# Patient Record
Sex: FEMALE | Race: WHITE | NOT HISPANIC OR LATINO | Employment: FULL TIME | URBAN - METROPOLITAN AREA
[De-identification: names, ages, dates, MRNs, and addresses within clinical notes are randomized per-mention and may not be internally consistent; named-entity substitution may affect disease eponyms.]

---

## 2017-01-19 ENCOUNTER — POST PARTUM (OUTPATIENT)
Dept: OBGYN | Facility: MEDICAL CENTER | Age: 37
End: 2017-01-19
Payer: COMMERCIAL

## 2017-01-19 VITALS
HEIGHT: 68 IN | WEIGHT: 264 LBS | DIASTOLIC BLOOD PRESSURE: 72 MMHG | SYSTOLIC BLOOD PRESSURE: 118 MMHG | BODY MASS INDEX: 40.01 KG/M2

## 2017-01-19 DIAGNOSIS — Z30.2 ENCOUNTER FOR STERILIZATION: ICD-10-CM

## 2017-01-19 PROCEDURE — 90050 PR POSTPARTUM VISIT: CPT | Performed by: OBSTETRICS & GYNECOLOGY

## 2017-01-19 RX ORDER — NORGESTIMATE AND ETHINYL ESTRADIOL 0.25-0.035
1 KIT ORAL DAILY
Qty: 28 TAB | Refills: 3 | Status: SHIPPED | OUTPATIENT
Start: 2017-01-19 | End: 2018-05-30 | Stop reason: SDUPTHER

## 2017-01-19 NOTE — MR AVS SNAPSHOT
"Caridad Nino   2017 2:15 PM   Post Partum   MRN: [id number]    Department:  [facility]   Dept Phone:  [phone]    Description:  Female : 1980   Provider:  Guillermina Joseph M.D.           Reason for Visit     Postpartum care           Allergies as of 2017     No Known Allergies      You were diagnosed with     Encounter for sterilization   [[id number]]         Vital Signs     Blood Pressure Height Weight Body Mass Index Last Menstrual Period Breastfeeding?    118/72 mmHg 1.727 m (5' 8\") 119.75 kg (264 lb) 40.15 kg/m2 2016 No    Smoking Status                   Former Smoker           Basic Information     Date Of Birth Sex Race Ethnicity Preferred Language    1980 Female White Non- English      Problem List              ICD-10-CM Priority Class Noted - Resolved    Supervision of elderly multigravida O09.529   2016 - Present    Gestational diabetes mellitus A2 O24.419   2016 - Present    Obesity E66.9   2016 - Present    GDM, class A2 O24.414   12/3/2016 - Present    Gestational HTN O13.9   12/3/2016 - Present    Morbid obesity due to excess calories (CMS-HCC) E66.01   12/3/2016 - Present      Health Maintenance        Date Due Completion Dates    PAP SMEAR 2019, 2016    IMM DTaP/Tdap/Td Vaccine (2 - Td) 2026            Current Immunizations     Influenza Vaccine Quad Inj (Pf) 2016  9:15 PM    Pneumococcal polysaccharide vaccine (PPSV-23) 2016  8:23 PM    Tdap Vaccine 2016      Below and/or attached are the medications your provider expects you to take. Review all of your home medications and newly ordered medications with your provider and/or pharmacist. Follow medication instructions as directed by your provider and/or pharmacist. Please keep your medication list with you and share with your provider. Update the information when medications are discontinued, doses are changed, or new " medications (including over-the-counter products) are added; and carry medication information at all times in the event of emergency situations     Allergies:  No Known Allergies          Medications  Valid as of: January 19, 2017 -  3:03 PM    Generic Name Brand Name Tablet Size Instructions for use    Docusate Sodium (Cap)  MG Take 100 mg by mouth 2 times a day as needed for Constipation.        Ferrous Fumarate-Vitamin C (Tab CR) Ferrous Fumarate-Vitamin C ER 65-25 MG Take 1 Tab by mouth 2 times a day, with meals.        Ibuprofen (Tab) MOTRIN 600 MG Take 1 Tab by mouth every 6 hours as needed (Cramping).        Prenatal Multivit-Min-Fe-FA   Take  by mouth.        .                 Medicines prescribed today were sent to:     [facility]/PHARMACY #[id number] - [place] - [address]    [address] [place] [address]    Phone: [phone] Fax: [phone]    Open 24 Hours?: No    [facility] # [id number] - [place] - [address]    [address] [address]    Phone: [phone] Fax: [phone]    Open 24 Hours?: No      Medication refill instructions:       If your prescription bottle indicates you have medication refills left, it is not necessary to call your provider’s office. Please contact your pharmacy and they will refill your medication.    If your prescription bottle indicates you do not have any refills left, you may request refills at any time through one of the following ways: The online C2cube system (except Urgent Care), by calling your provider’s office, or by asking your pharmacy to contact your provider’s office with a refill request. Medication refills are processed only during regular business hours and may not be available until the next business day. Your provider may request additional information or to have a follow-up visit with you prior to refilling your medication.   *Please Note: Medication refills are assigned a new Rx number when refilled electronically. Your pharmacy may  indicate that no refills were authorized even though a new prescription for the same medication is available at the pharmacy. Please request the medicine by name with the pharmacy before contacting your provider for a refill.           GenSpera Access Code: [email or web address]  Expires: 2/12/2017 10:49 AM    GenSpera  A secure, online tool to manage your health information     [employer]’s GenSpera® is a secure, online tool that connects you to your personalized health information from the privacy of your home -- day or night - making it very easy for you to manage your healthcare. Once the activation process is completed, you can even access your medical information using the GenSpera hakan, which is available for free in the Apple Hakan store or Google Play store.     GenSpera provides the following levels of access (as shown below):   My Chart Features   [facility] Doctor [facility]  Specialists [facility]  Urgent  Care Non-[facility]  Primary Care  Doctor   Email your healthcare team securely and privately 24/7 X X X    Manage appointments: schedule your next appointment; view details of past/upcoming appointments X      Request prescription refills. X      View recent personal medical records, including lab and immunizations X X X X   View health record, including health history, allergies, medications X X X X   Read reports about your outpatient visits, procedures, consult and ER notes X X X X   See your discharge summary, which is a recap of your hospital and/or ER visit that includes your diagnosis, lab results, and care plan. X X       How to register for GenSpera:  1. Go to  [email or web address].  2. Click on the Sign Up Now box, which takes you to the New Member Sign Up page. You will need to provide the following information:  a. Enter your GenSpera Access Code exactly as it appears at the top of this page. (You will not need to use this code after you’ve completed the sign-up process. If you do not sign up  before the expiration date, you must request a new code.)   b. Enter your date of birth.   c. Enter your home email address.   d. Click Submit, and follow the next screen’s instructions.  3. Create a [employer] ID. This will be your [employer] login ID and cannot be changed, so think of one that is secure and easy to remember.  4. Create a [employer] password. You can change your password at any time.  5. Enter your Password Reset Question and Answer. This can be used at a later time if you forget your password.   6. Enter your e-mail address. This allows you to receive e-mail notifications when new information is available in [employer].  7. Click Sign Up. You can now view your health information.    For assistance activating your [employer] account, call [phone]

## 2017-01-19 NOTE — PROGRESS NOTES
"   Chief Complaint   Patient presents with   • Postpartum care          Caridad Nino presents 5 weeks post partum following a spontaneous vaginal delivery.    Is not breast feeding currently.    ROS:           General Health:   Has a perception of excellent health.         Bleeding History: thin lochia         Breast:  Denies breast tenderness, mass, discharge, changes in size or contour, or abnormal cyclic discomfort.         GI:  Negative for abdominal pain, mass, fullness, nausea, vomiting, diarrhea, mucous or bloody stools.         Urinary:  Negative.}         Vulvo-Vaginal: contraception: none        PE:          /72 mmHg  Ht 1.727 m (5' 8\")  Wt 119.75 kg (264 lb)  BMI 40.15 kg/m2  LMP 02/25/2016  Breastfeeding? No         Breasts: negative         Abdomen: Abdomen soft, non-tender. BS normal. No masses,  No organomegaly          Pelvic Exam:              Vulva:normal              Vagina:normal vagina              Cervix: normal cervix: pale pink, mobile, non-tender, no discharge, no lacerations, no lesions              Corpus: normal size, contour, position, consistency, mobility, non-tender              Adnexa: Normal adnexa         Rectal Exam: Not performed.        Impression:         35 y/o   postpartum   wants sterilisation   discussed options essure VS laparoscopic  BTL (wants burnt and cut)    Contraception:         tubal ligation        Return visit in 6 weeks.  "

## 2017-01-24 ENCOUNTER — TELEPHONE (OUTPATIENT)
Dept: OBGYN | Facility: MEDICAL CENTER | Age: 37
End: 2017-01-24

## 2017-01-24 DIAGNOSIS — Z30.430 ENCOUNTER FOR INSERTION OF MIRENA IUD: ICD-10-CM

## 2017-01-24 NOTE — TELEPHONE ENCOUNTER
Called and spoke to pt and informed pt her insurance company does not cover a btl.  Pt is requesting a referral for an IUD/Mirena.  Will you please have Dr Joseph create a referral for an IUD.    Thank you.

## 2017-06-07 ENCOUNTER — TELEPHONE (OUTPATIENT)
Dept: OBGYN | Facility: MEDICAL CENTER | Age: 37
End: 2017-06-07

## 2017-06-07 NOTE — TELEPHONE ENCOUNTER
Pt requesting to have a refill of her bc pills if possible send it by Friday 6/9/17 to [facility] b/c will be cheaper for her and pt wanted it to 90 days supplies at a time. Thank you.

## 2017-08-04 ENCOUNTER — TELEPHONE (OUTPATIENT)
Dept: OBGYN | Facility: MEDICAL CENTER | Age: 37
End: 2017-08-04

## 2017-08-04 NOTE — TELEPHONE ENCOUNTER
Pt called stating she needs a refill on her birth control which I called in until she can have her annual exam 10/6/17. Pt aware there will be no further refills if she does not come to her appointment.

## 2017-08-14 ENCOUNTER — HOSPITAL ENCOUNTER (OUTPATIENT)
Facility: MEDICAL CENTER | Age: 37
End: 2017-08-15
Attending: EMERGENCY MEDICINE | Admitting: INTERNAL MEDICINE

## 2017-08-14 ENCOUNTER — RESOLUTE PROFESSIONAL BILLING HOSPITAL PROF FEE (OUTPATIENT)
Dept: HOSPITALIST | Facility: MEDICAL CENTER | Age: 37
End: 2017-08-14

## 2017-08-14 ENCOUNTER — APPOINTMENT (OUTPATIENT)
Dept: RADIOLOGY | Facility: MEDICAL CENTER | Age: 37
End: 2017-08-14
Attending: INTERNAL MEDICINE

## 2017-08-14 DIAGNOSIS — R42 VERTIGO: ICD-10-CM

## 2017-08-14 PROBLEM — E66.01 MORBID OBESITY (HCC): Status: ACTIVE | Noted: 2017-08-14

## 2017-08-14 PROBLEM — R73.9 HYPERGLYCEMIA: Status: ACTIVE | Noted: 2017-08-14

## 2017-08-14 PROBLEM — D69.6 THROMBOCYTOPENIA (HCC): Status: ACTIVE | Noted: 2017-08-14

## 2017-08-14 LAB
ALBUMIN SERPL BCP-MCNC: 3.4 G/DL (ref 3.2–4.9)
ALBUMIN/GLOB SERPL: 1.2 G/DL
ALP SERPL-CCNC: 44 U/L (ref 30–99)
ALT SERPL-CCNC: 10 U/L (ref 2–50)
ANION GAP SERPL CALC-SCNC: 9 MMOL/L (ref 0–11.9)
APTT PPP: 23.9 SEC (ref 24.7–36)
AST SERPL-CCNC: 13 U/L (ref 12–45)
BASOPHILS # BLD AUTO: 0.5 % (ref 0–1.8)
BASOPHILS # BLD: 0.03 K/UL (ref 0–0.12)
BILIRUB SERPL-MCNC: 0.4 MG/DL (ref 0.1–1.5)
BUN SERPL-MCNC: 12 MG/DL (ref 8–22)
CALCIUM SERPL-MCNC: 8.6 MG/DL (ref 8.5–10.5)
CHLORIDE SERPL-SCNC: 104 MMOL/L (ref 96–112)
CO2 SERPL-SCNC: 23 MMOL/L (ref 20–33)
CREAT SERPL-MCNC: 0.59 MG/DL (ref 0.5–1.4)
EOSINOPHIL # BLD AUTO: 0.04 K/UL (ref 0–0.51)
EOSINOPHIL NFR BLD: 0.6 % (ref 0–6.9)
ERYTHROCYTE [DISTWIDTH] IN BLOOD BY AUTOMATED COUNT: 46.7 FL (ref 35.9–50)
GFR SERPL CREATININE-BSD FRML MDRD: >60 ML/MIN/1.73 M 2
GLOBULIN SER CALC-MCNC: 2.9 G/DL (ref 1.9–3.5)
GLUCOSE SERPL-MCNC: 137 MG/DL (ref 65–99)
HCG SERPL QL: NEGATIVE
HCT VFR BLD AUTO: 40.6 % (ref 37–47)
HGB BLD-MCNC: 12.9 G/DL (ref 12–16)
IMM GRANULOCYTES # BLD AUTO: 0.01 K/UL (ref 0–0.11)
IMM GRANULOCYTES NFR BLD AUTO: 0.2 % (ref 0–0.9)
INR PPP: 0.95 (ref 0.87–1.13)
LYMPHOCYTES # BLD AUTO: 1.27 K/UL (ref 1–4.8)
LYMPHOCYTES NFR BLD: 19.4 % (ref 22–41)
MAGNESIUM SERPL-MCNC: 1.9 MG/DL (ref 1.5–2.5)
MCH RBC QN AUTO: 29.7 PG (ref 27–33)
MCHC RBC AUTO-ENTMCNC: 31.8 G/DL (ref 33.6–35)
MCV RBC AUTO: 93.5 FL (ref 81.4–97.8)
MONOCYTES # BLD AUTO: 0.35 K/UL (ref 0–0.85)
MONOCYTES NFR BLD AUTO: 5.3 % (ref 0–13.4)
NEUTROPHILS # BLD AUTO: 4.85 K/UL (ref 2–7.15)
NEUTROPHILS NFR BLD: 74 % (ref 44–72)
NRBC # BLD AUTO: 0 K/UL
NRBC BLD AUTO-RTO: 0 /100 WBC
PLATELET # BLD AUTO: 137 K/UL (ref 164–446)
PMV BLD AUTO: 12.2 FL (ref 9–12.9)
POTASSIUM SERPL-SCNC: 4.4 MMOL/L (ref 3.6–5.5)
PROT SERPL-MCNC: 6.3 G/DL (ref 6–8.2)
PROTHROMBIN TIME: 13 SEC (ref 12–14.6)
RBC # BLD AUTO: 4.34 M/UL (ref 4.2–5.4)
SODIUM SERPL-SCNC: 136 MMOL/L (ref 135–145)
TROPONIN I SERPL-MCNC: <0.01 NG/ML (ref 0–0.04)
TSH SERPL DL<=0.005 MIU/L-ACNC: 1.59 UIU/ML (ref 0.3–3.7)
WBC # BLD AUTO: 6.6 K/UL (ref 4.8–10.8)

## 2017-08-14 PROCEDURE — 96374 THER/PROPH/DIAG INJ IV PUSH: CPT

## 2017-08-14 PROCEDURE — 94760 N-INVAS EAR/PLS OXIMETRY 1: CPT

## 2017-08-14 PROCEDURE — 700111 HCHG RX REV CODE 636 W/ 250 OVERRIDE (IP): Performed by: INTERNAL MEDICINE

## 2017-08-14 PROCEDURE — 84703 CHORIONIC GONADOTROPIN ASSAY: CPT

## 2017-08-14 PROCEDURE — 700102 HCHG RX REV CODE 250 W/ 637 OVERRIDE(OP): Performed by: INTERNAL MEDICINE

## 2017-08-14 PROCEDURE — 700105 HCHG RX REV CODE 258: Performed by: EMERGENCY MEDICINE

## 2017-08-14 PROCEDURE — 96361 HYDRATE IV INFUSION ADD-ON: CPT

## 2017-08-14 PROCEDURE — 80053 COMPREHEN METABOLIC PANEL: CPT

## 2017-08-14 PROCEDURE — 85610 PROTHROMBIN TIME: CPT

## 2017-08-14 PROCEDURE — A9270 NON-COVERED ITEM OR SERVICE: HCPCS | Performed by: INTERNAL MEDICINE

## 2017-08-14 PROCEDURE — 84443 ASSAY THYROID STIM HORMONE: CPT

## 2017-08-14 PROCEDURE — 96375 TX/PRO/DX INJ NEW DRUG ADDON: CPT

## 2017-08-14 PROCEDURE — A9270 NON-COVERED ITEM OR SERVICE: HCPCS | Performed by: EMERGENCY MEDICINE

## 2017-08-14 PROCEDURE — 700111 HCHG RX REV CODE 636 W/ 250 OVERRIDE (IP): Performed by: EMERGENCY MEDICINE

## 2017-08-14 PROCEDURE — 85730 THROMBOPLASTIN TIME PARTIAL: CPT

## 2017-08-14 PROCEDURE — 84484 ASSAY OF TROPONIN QUANT: CPT

## 2017-08-14 PROCEDURE — 99285 EMERGENCY DEPT VISIT HI MDM: CPT

## 2017-08-14 PROCEDURE — 700102 HCHG RX REV CODE 250 W/ 637 OVERRIDE(OP): Performed by: EMERGENCY MEDICINE

## 2017-08-14 PROCEDURE — 70450 CT HEAD/BRAIN W/O DYE: CPT

## 2017-08-14 PROCEDURE — G0378 HOSPITAL OBSERVATION PER HR: HCPCS

## 2017-08-14 PROCEDURE — 83036 HEMOGLOBIN GLYCOSYLATED A1C: CPT

## 2017-08-14 PROCEDURE — 99220 PR INITIAL OBSERVATION CARE,LEVL III: CPT | Performed by: INTERNAL MEDICINE

## 2017-08-14 PROCEDURE — 96372 THER/PROPH/DIAG INJ SC/IM: CPT

## 2017-08-14 PROCEDURE — 83735 ASSAY OF MAGNESIUM: CPT

## 2017-08-14 PROCEDURE — 700105 HCHG RX REV CODE 258: Performed by: INTERNAL MEDICINE

## 2017-08-14 PROCEDURE — 93005 ELECTROCARDIOGRAM TRACING: CPT | Performed by: EMERGENCY MEDICINE

## 2017-08-14 PROCEDURE — 85025 COMPLETE CBC W/AUTO DIFF WBC: CPT

## 2017-08-14 RX ORDER — LORAZEPAM 1 MG/1
0.5 TABLET ORAL EVERY 6 HOURS PRN
Status: DISCONTINUED | OUTPATIENT
Start: 2017-08-14 | End: 2017-08-15 | Stop reason: HOSPADM

## 2017-08-14 RX ORDER — MECLIZINE HYDROCHLORIDE 25 MG/1
25 TABLET ORAL ONCE
Status: COMPLETED | OUTPATIENT
Start: 2017-08-14 | End: 2017-08-14

## 2017-08-14 RX ORDER — PROMETHAZINE HYDROCHLORIDE 12.5 MG/1
12.5-25 SUPPOSITORY RECTAL EVERY 4 HOURS PRN
Status: DISCONTINUED | OUTPATIENT
Start: 2017-08-14 | End: 2017-08-15 | Stop reason: HOSPADM

## 2017-08-14 RX ORDER — SODIUM CHLORIDE 9 MG/ML
1000 INJECTION, SOLUTION INTRAVENOUS ONCE
Status: COMPLETED | OUTPATIENT
Start: 2017-08-14 | End: 2017-08-14

## 2017-08-14 RX ORDER — LORAZEPAM 2 MG/ML
0.5 INJECTION INTRAMUSCULAR EVERY 6 HOURS PRN
Status: DISCONTINUED | OUTPATIENT
Start: 2017-08-14 | End: 2017-08-15 | Stop reason: HOSPADM

## 2017-08-14 RX ORDER — LORAZEPAM 2 MG/ML
1 INJECTION INTRAMUSCULAR
Status: COMPLETED | OUTPATIENT
Start: 2017-08-14 | End: 2017-08-14

## 2017-08-14 RX ORDER — ACETAMINOPHEN 325 MG/1
650 TABLET ORAL EVERY 6 HOURS PRN
Status: DISCONTINUED | OUTPATIENT
Start: 2017-08-14 | End: 2017-08-15 | Stop reason: HOSPADM

## 2017-08-14 RX ORDER — ONDANSETRON 2 MG/ML
4 INJECTION INTRAMUSCULAR; INTRAVENOUS ONCE
Status: COMPLETED | OUTPATIENT
Start: 2017-08-14 | End: 2017-08-14

## 2017-08-14 RX ORDER — BISACODYL 10 MG
10 SUPPOSITORY, RECTAL RECTAL
Status: DISCONTINUED | OUTPATIENT
Start: 2017-08-14 | End: 2017-08-15 | Stop reason: HOSPADM

## 2017-08-14 RX ORDER — ONDANSETRON 4 MG/1
4 TABLET, ORALLY DISINTEGRATING ORAL EVERY 4 HOURS PRN
Status: DISCONTINUED | OUTPATIENT
Start: 2017-08-14 | End: 2017-08-15 | Stop reason: HOSPADM

## 2017-08-14 RX ORDER — AMOXICILLIN 250 MG
2 CAPSULE ORAL 2 TIMES DAILY
Status: DISCONTINUED | OUTPATIENT
Start: 2017-08-15 | End: 2017-08-15 | Stop reason: HOSPADM

## 2017-08-14 RX ORDER — DIAZEPAM 2 MG/1
2 TABLET ORAL ONCE
Status: COMPLETED | OUTPATIENT
Start: 2017-08-14 | End: 2017-08-14

## 2017-08-14 RX ORDER — LABETALOL HYDROCHLORIDE 5 MG/ML
10 INJECTION, SOLUTION INTRAVENOUS EVERY 4 HOURS PRN
Status: DISCONTINUED | OUTPATIENT
Start: 2017-08-14 | End: 2017-08-15 | Stop reason: HOSPADM

## 2017-08-14 RX ORDER — MECLIZINE HYDROCHLORIDE 25 MG/1
25 TABLET ORAL 3 TIMES DAILY
Status: DISCONTINUED | OUTPATIENT
Start: 2017-08-14 | End: 2017-08-15 | Stop reason: HOSPADM

## 2017-08-14 RX ORDER — PROMETHAZINE HYDROCHLORIDE 25 MG/1
12.5-25 TABLET ORAL EVERY 4 HOURS PRN
Status: DISCONTINUED | OUTPATIENT
Start: 2017-08-14 | End: 2017-08-15 | Stop reason: HOSPADM

## 2017-08-14 RX ORDER — POLYETHYLENE GLYCOL 3350 17 G/17G
1 POWDER, FOR SOLUTION ORAL
Status: DISCONTINUED | OUTPATIENT
Start: 2017-08-14 | End: 2017-08-15 | Stop reason: HOSPADM

## 2017-08-14 RX ORDER — SODIUM CHLORIDE 9 MG/ML
INJECTION, SOLUTION INTRAVENOUS CONTINUOUS
Status: DISCONTINUED | OUTPATIENT
Start: 2017-08-14 | End: 2017-08-15 | Stop reason: HOSPADM

## 2017-08-14 RX ORDER — ONDANSETRON 2 MG/ML
4 INJECTION INTRAMUSCULAR; INTRAVENOUS EVERY 4 HOURS PRN
Status: DISCONTINUED | OUTPATIENT
Start: 2017-08-14 | End: 2017-08-15 | Stop reason: HOSPADM

## 2017-08-14 RX ADMIN — LORAZEPAM 1 MG: 2 INJECTION INTRAMUSCULAR; INTRAVENOUS at 21:20

## 2017-08-14 RX ADMIN — ONDANSETRON 4 MG: 2 INJECTION INTRAMUSCULAR; INTRAVENOUS at 13:16

## 2017-08-14 RX ADMIN — SODIUM CHLORIDE: 9 INJECTION, SOLUTION INTRAVENOUS at 18:47

## 2017-08-14 RX ADMIN — MECLIZINE HYDROCHLORIDE 25 MG: 25 TABLET ORAL at 19:45

## 2017-08-14 RX ADMIN — ENOXAPARIN SODIUM 40 MG: 100 INJECTION SUBCUTANEOUS at 19:45

## 2017-08-14 RX ADMIN — DIAZEPAM 2 MG: 2 TABLET ORAL at 15:05

## 2017-08-14 RX ADMIN — SODIUM CHLORIDE 1000 ML: 9 INJECTION, SOLUTION INTRAVENOUS at 13:16

## 2017-08-14 RX ADMIN — MECLIZINE HYDROCHLORIDE 25 MG: 25 TABLET ORAL at 14:19

## 2017-08-14 ASSESSMENT — ENCOUNTER SYMPTOMS
DIARRHEA: 0
STRIDOR: 0
CONSTIPATION: 0
CHILLS: 0
HEARTBURN: 0
HEMOPTYSIS: 0
EYE REDNESS: 0
DIAPHORESIS: 0
SEIZURES: 0
NECK PAIN: 0
SPEECH CHANGE: 0
EYE PAIN: 0
TREMORS: 0
INSOMNIA: 0
WEIGHT LOSS: 0
LOSS OF CONSCIOUSNESS: 0
SPUTUM PRODUCTION: 0
WHEEZING: 0
ABDOMINAL PAIN: 0
BACK PAIN: 0
TINGLING: 0
EYE DISCHARGE: 0
DIZZINESS: 1
DEPRESSION: 0
HALLUCINATIONS: 0
MEMORY LOSS: 0
WEAKNESS: 1
FLANK PAIN: 0
FALLS: 0
FOCAL WEAKNESS: 0
CLAUDICATION: 0
VOMITING: 0
BLOOD IN STOOL: 0
BLURRED VISION: 0
SENSORY CHANGE: 0
NAUSEA: 0
PHOTOPHOBIA: 0
DOUBLE VISION: 0
FEVER: 0
PND: 0
SHORTNESS OF BREATH: 0
COUGH: 0
ORTHOPNEA: 0
HEADACHES: 0
MYALGIAS: 0
NERVOUS/ANXIOUS: 0
PALPITATIONS: 0
SORE THROAT: 0

## 2017-08-14 ASSESSMENT — COPD QUESTIONNAIRES
HAVE YOU SMOKED AT LEAST 100 CIGARETTES IN YOUR ENTIRE LIFE: YES
DURING THE PAST 4 WEEKS HOW MUCH DID YOU FEEL SHORT OF BREATH: NONE/LITTLE OF THE TIME
COPD SCREENING SCORE: 2
DO YOU EVER COUGH UP ANY MUCUS OR PHLEGM?: NO/ONLY WITH OCCASIONAL COLDS OR INFECTIONS

## 2017-08-14 ASSESSMENT — LIFESTYLE VARIABLES
SUBSTANCE_ABUSE: 0
EVER_SMOKED: YES
ALCOHOL_USE: NO

## 2017-08-14 ASSESSMENT — PATIENT HEALTH QUESTIONNAIRE - PHQ9
SUM OF ALL RESPONSES TO PHQ QUESTIONS 1-9: 0
SUM OF ALL RESPONSES TO PHQ9 QUESTIONS 1 AND 2: 0
2. FEELING DOWN, DEPRESSED, IRRITABLE, OR HOPELESS: NOT AT ALL
1. LITTLE INTEREST OR PLEASURE IN DOING THINGS: NOT AT ALL

## 2017-08-14 ASSESSMENT — PAIN SCALES - GENERAL: PAINLEVEL_OUTOF10: 0

## 2017-08-14 NOTE — ED PROVIDER NOTES
"ED Provider Note    CHIEF COMPLAINT  Chief Complaint   Patient presents with   • Dizziness   • Weakness   • N/V       HPI  Caridad Nino is a 37 y.o. female who presents with a chief complaint to me of \"I woke up this morning and the room was dizzy.\" She describes room spinning on her. Worse if she moves. She was fine when she went to bed. Fascia was fine which woke up at 3:30 to check on her baby. However she woke at 7:30 with these symptoms. She has associated nausea and vomiting area diffuse weakness, no focal weakness or numbness. Her vision is fine although looking at things does make her more dizzy. She denies any fever or chills. No recent illness. No recent trauma. She has no chest pain. She does have some belly pain to which he attributes the retching but it does not hurt to touch. She denies any change in bowel or bladder. She does not think that she is pregnant but she did have something similar to this although less severe and she was diagnosed with pregnancy 2 weeks later. There is no other complaint.    PAST MEDICAL HISTORY  Past Medical History   Diagnosis Date   • Hypertension 1997     JUST DURING PREGNANCY   • Bronchitis 1995   • Hemorrhoids 1998   • Gestational diabetes mellitus 8/30/2016   • History of shingles June/July 2016   • Pregnant    • Heart burn    • Dental disorder    • Pneumonia    • Snoring        FAMILY HISTORY  No family history on file.    SOCIAL HISTORY  Social History   Substance Use Topics   • Smoking status: Former Smoker -- 1.00 packs/day for 27 years     Types: Cigarettes     Start date: 01/01/1989     Quit date: 04/05/2016   • Smokeless tobacco: Never Used   • Alcohol Use: No         SURGICAL HISTORY  Past Surgical History   Procedure Laterality Date   • Cholecystectomy  1998       CURRENT MEDICATIONS  Home Medications     Reviewed by Emily Vo (Pharmacy Tech) on 08/14/17 at 1510  Med List Status: Complete    Medication Last Dose Status    " "norgestimate-ethinyl estradiol (ORTHO-CYCLEN) 0.25-35 MG-MCG per tablet 8/13/2017 Active                I have reviewed the nurses notes and/or the list brought with the patient.    ALLERGIES  No Known Allergies    REVIEW OF SYSTEMS  See HPI for further details. Review of systems as above, otherwise all other systems are negative.     PHYSICAL EXAM  VITAL SIGNS: /87 mmHg  Pulse 75  Temp(Src) 36.1 °C (97 °F) (Temporal)  Resp 18  Ht 1.727 m (5' 8\")  Wt 128 kg (282 lb 3 oz)  BMI 42.92 kg/m2  SpO2 91%  LMP 02/25/2016  Constitutional: Well appearing patient in no acute distress.  Not toxic, nor ill in appearance.  HENT: Mucus membranes moist.  Oropharynx is clear. Tympanic membranes are normal bilaterally. There is no mastoid tenderness.  Eyes: Pupils equally round.  No scleral icterus.   Neck: Full nontender range of motion. There is no meningismus or Brudzinski's.  Lymphatic: No cervical lymphadenopathy noted.   Cardiovascular: Regular heart rate and rhythm.  No murmurs, rubs, nor gallop appreciated.   Thorax & Lungs: Chest is nontender.  Lungs are clear to auscultation with good air movement bilaterally.  No wheeze, rhonchi, nor rales.   Abdomen: Soft, with no tenderness, rebound nor guarding.  No mass, pulsatile mass, nor hepatosplenomegaly appreciated.  Skin: No purpura nor petechia noted.  Extremities/Musculoskeletal: No sign of trauma.  Calves are nontender with no cords nor edema.  No Earl's sign.  Pulses are intact all around.   Neurologic: Alert & oriented.  Strength and sensation is intact all around.  Gait is not assessed. There is no dysmetria. Cranial nerves are notable for nystagmus but otherwise negative.  Psychiatric: Normal affect appropriate for the clinical situation.    EKG  I interpreted this EKG myself.  This is a 12-lead study.  The rhythm is sinus with a rate of 69.  There are no ST segment nor T wave abnormalities.  Interpretation: No ST segment elevation myocardial " infarction.    LABS  Labs Reviewed   CBC WITH DIFFERENTIAL - Abnormal; Notable for the following:     MCHC 31.8 (*)     Platelet Count 137 (*)     Neutrophils-Polys 74.00 (*)     Lymphocytes 19.40 (*)     All other components within normal limits    Narrative:     Indicate which anticoagulants the patient is on:->UNKNOWN   COMP METABOLIC PANEL - Abnormal; Notable for the following:     Glucose 137 (*)     All other components within normal limits    Narrative:     Indicate which anticoagulants the patient is on:->UNKNOWN   APTT - Abnormal; Notable for the following:     APTT 23.9 (*)     All other components within normal limits    Narrative:     Indicate which anticoagulants the patient is on:->UNKNOWN   TROPONIN    Narrative:     Indicate which anticoagulants the patient is on:->UNKNOWN   PROTHROMBIN TIME    Narrative:     Indicate which anticoagulants the patient is on:->UNKNOWN   HCG QUAL SERUM    Narrative:     Indicate which anticoagulants the patient is on:->UNKNOWN   ESTIMATED GFR    Narrative:     Indicate which anticoagulants the patient is on:->UNKNOWN   TSH   HEMOGLOBIN A1C   MAGNESIUM         MEDICAL RECORD  I have reviewed patient's medical record and pertinent results are listed above.    COURSE & MEDICAL DECISION MAKING  I have reviewed any medical record information, laboratory studies and radiographic results as noted above.  This patient presents with vertigo. Given her age and lack of risk factors, I do suspect that this is peripheral vertigo. However, she is given Zofran, meclizine, Valium and still quite symptomatic. Because of this will merit of the hospital. I do suspect that she should get therapy as well as further workup such as MRI to exclude a central process. The case is discussed with the [facility] hospitalist, she is admitted.      FINAL IMPRESSION  1. Vertigo     2. Intractable dizziness  3. Mild hyperglycemia       This dictation was created using voice recognition  software.    Electronically signed by: Alfonso Hand, 8/14/2017 4:19 PM

## 2017-08-14 NOTE — ED NOTES
38 y/o female ambulate to triage   Chief Complaint   Patient presents with   • Dizziness   • Weakness   • N/V   pt states she woke up with this today

## 2017-08-14 NOTE — IP AVS SNAPSHOT
" Home Care Instructions                                                                                                                  Name:Caridad Nino  Medical Record Number:[id number]  CSN: [id number]    YOB: 1980   Age: 37 y.o.  Sex: female  HT:1.727 m (5' 8\") WT: 128.1 kg (282 lb 6.6 oz)          Admit Date: 8/14/2017     Discharge Date:   Today's Date: 8/15/2017  Attending Doctor:  Saloni Kauffman D.O.                  Allergies:  Review of patient's allergies indicates no known allergies.            Discharge Instructions       Discharge Instructions    Discharged to home by car with relative. Discharged via wheelchair, hospital escort: Yes.  Special equipment needed: Not Applicable    Be sure to schedule a follow-up appointment with your primary care doctor or any specialists as instructed.     Discharge Plan:   Diet Plan: Discussed  Activity Level: Discussed  Confirmed Follow up Appointment: Appointment Scheduled  Confirmed Symptoms Management: Discussed  Medication Reconciliation Updated: Yes  Influenza Vaccine Indication: Patient Refuses    I understand that a diet low in cholesterol, fat, and sodium is recommended for good health. Unless I have been given specific instructions below for another diet, I accept this instruction as my diet prescription.   Other diet: Heart Healthy    Special Instructions:   Activity as tolerated  Diet: Heart Healthy   Take Rx as prescribed  F/u with PCP within 10 days, if symptoms continue get a referral and follow up with neurology and get a MRI Brain scheduled with sedation  Call Primary Care MD if new problems arise   Return to ER/hospital for SOB, CP, or worsening/concerning symptoms    · Is patient discharged on Warfarin / Coumadin?   No     · Is patient Post Blood Transfusion?  No    Depression / Suicide Risk    As you are discharged from this [facility] facility, it is important to learn how to keep safe from harming yourself.    Recognize " the warning signs:  · Abrupt changes in personality, positive or negative- including increase in energy   · Giving away possessions  · Change in eating patterns- significant weight changes-  positive or negative  · Change in sleeping patterns- unable to sleep or sleeping all the time   · Unwillingness or inability to communicate  · Depression  · Unusual sadness, discouragement and loneliness  · Talk of wanting to die  · Neglect of personal appearance   · Rebelliousness- reckless behavior  · Withdrawal from people/activities they love  · Confusion- inability to concentrate     If you or a loved one observes any of these behaviors or has concerns about self-harm, here's what you can do:  · Talk about it- your feelings and reasons for harming yourself  · Remove any means that you might use to hurt yourself (examples: pills, rope, extension cords, firearm)  · Get professional help from the community (Mental Health, Substance Abuse, psychological counseling)  · Do not be alone:Call your Safe Contact- someone whom you trust who will be there for you.  · Call your local CRISIS HOTLINE 991-7243 or 993-651-1109  · Call your local Children's Mobile Crisis Response Team [place] (928) 723-5077 or www.[employer] Network  · Call the toll free National Suicide Prevention Hotlines   · National Suicide Prevention Lifeline 237-304-FYWJ (7435)  · National Hope Line Network 800-SUICIDE (279-8476)    Vertigo  Vertigo means you feel like you or your surroundings are moving when they are not. Vertigo can be dangerous if it occurs when you are at work, driving, or performing difficult activities.   CAUSES   Vertigo occurs when there is a conflict of signals sent to your brain from the visual and sensory systems in your body. There are many different causes of vertigo, including:  · Infections, especially in the inner ear.  · A bad reaction to a drug or misuse of alcohol and medicines.  · Withdrawal from drugs or alcohol.  · Rapidly  changing positions, such as lying down or rolling over in bed.  · A migraine headache.  · Decreased blood flow to the brain.  · Increased pressure in the brain from a head injury, infection, tumor, or bleeding.  SYMPTOMS   You may feel as though the world is spinning around or you are falling to the ground. Because your balance is upset, vertigo can cause nausea and vomiting. You may have involuntary eye movements (nystagmus).  DIAGNOSIS   Vertigo is usually diagnosed by physical exam. If the cause of your vertigo is unknown, your caregiver may perform imaging tests, such as an MRI scan (magnetic resonance imaging).  TREATMENT   Most cases of vertigo resolve on their own, without treatment. Depending on the cause, your caregiver may prescribe certain medicines. If your vertigo is related to body position issues, your caregiver may recommend movements or procedures to correct the problem. In rare cases, if your vertigo is caused by certain inner ear problems, you may need surgery.  HOME CARE INSTRUCTIONS   · Follow your caregiver's instructions.  · Avoid driving.  · Avoid operating heavy machinery.  · Avoid performing any tasks that would be dangerous to you or others during a vertigo episode.  · Tell your caregiver if you notice that certain medicines seem to be causing your vertigo. Some of the medicines used to treat vertigo episodes can actually make them worse in some people.  SEEK IMMEDIATE MEDICAL CARE IF:   · Your medicines do not relieve your vertigo or are making it worse.  · You develop problems with talking, walking, weakness, or using your arms, hands, or legs.  · You develop severe headaches.  · Your nausea or vomiting continues or gets worse.  · You develop visual changes.  · A family member notices behavioral changes.  · Your condition gets worse.  MAKE SURE YOU:  · Understand these instructions.  · Will watch your condition.  · Will get help right away if you are not doing well or get worse.     This  information is not intended to replace advice given to you by your health care provider. Make sure you discuss any questions you have with your health care provider.     Document Released: 09/27/2006 Document Revised: 03/11/2013 Document Reviewed: 04/11/2016  [employer] Interactive Patient Education ©2016 Elsevier Inc.      Meclizine tablets or capsules  What is this medicine?  MECLIZINE (MEK li zeen) is an antihistamine. It is used to prevent nausea, vomiting, or dizziness caused by motion sickness. It is also used to prevent and treat vertigo (extreme dizziness or a feeling that you or your surroundings are tilting or spinning around).  This medicine may be used for other purposes; ask your health care provider or pharmacist if you have questions.  COMMON BRAND NAME(S): Antivert, Dramamine Less Drowsy, Medivert, Meni-D   What should I tell my health care provider before I take this medicine?  They need to know if you have any of these conditions:  -asthma  -glaucoma  -prostate trouble  -stomach problems  -urinary problems  -an unusual or allergic reaction to meclizine, other medicines, foods, dyes, or preservatives  -pregnant or trying to get pregnant  -breast-feeding  How should I use this medicine?  Take this medicine by mouth with a glass of water. Follow the directions on the prescription label. If you are using this medicine to prevent motion sickness, take the dose at least 1 hour before travel. If it upsets your stomach, take it with food or milk. Take your doses at regular intervals. Do not take your medicine more often than directed.  Talk to your pediatrician regarding the use of this medicine in children. Special care may be needed.  Overdosage: If you think you have taken too much of this medicine contact a poison control center or emergency room at once.  NOTE: This medicine is only for you. Do not share this medicine with others.  What if I miss a dose?  If you miss a dose, take it as soon as you can.  If it is almost time for your next dose, take only that dose. Do not take double or extra doses.  What may interact with this medicine?  -barbiturate medicines for inducing sleep or treating seizures  -digoxin  -medicines for anxiety or sleeping problems, like alprazolam, diazepam or temazepam  -medicines for hay fever and other allergies  -medicines for mental depression  -medicines for movement abnormalities as in Parkinson's disease, or for stomach problems  -medicines for pain  -medicines that relax muscles  This list may not describe all possible interactions. Give your health care provider a list of all the medicines, herbs, non-prescription drugs, or dietary supplements you use. Also tell them if you smoke, drink alcohol, or use illegal drugs. Some items may interact with your medicine.  What should I watch for while using this medicine?  If you are taking this medicine on a regular schedule, visit your doctor or health care professional for regular checks on your progress.  You may get dizzy, drowsy or have blurred vision. Do not drive, use machinery, or do anything that needs mental alertness until you know how this medicine affects you. Do not stand or sit up quickly, especially if you are an older patient. This reduces the risk of dizzy or fainting spells. Alcohol can increase possible dizziness. Avoid alcoholic drinks.  Your mouth may get dry. Chewing sugarless gum or sucking hard candy, and drinking plenty of water may help. Contact your doctor if the problem does not go away or is severe.  This medicine may cause dry eyes and blurred vision. If you wear contact lenses you may feel some discomfort. Lubricating drops may help. See your eye doctor if the problem does not go away or is severe.  What side effects may I notice from receiving this medicine?  Side effects that you should report to your doctor or health care professional as soon as possible:  -fainting spells  -fast or irregular heartbeat  Side  effects that usually do not require medical attention (report to your doctor or health care professional if they continue or are bothersome):  -constipation  -difficulty passing urine  -difficulty sleeping  -headache  -stomach upset  This list may not describe all possible side effects. Call your doctor for medical advice about side effects. You may report side effects to FDA at 8-392-XOI-3788.  Where should I keep my medicine?  Keep out of the reach of children.  Store at room temperature between 15 and 30 degrees C (59 and 86 degrees F). Keep container tightly closed. Throw away any unused medicine after the expiration date.  NOTE: This sheet is a summary. It may not cover all possible information. If you have questions about this medicine, talk to your doctor, pharmacist, or health care provider.  © 2014, Elsevier/Gold Standard. (6/25/2009 10:35:36 AM)      Monitoring for Diabetes  There are two blood tests that help you monitor and manage your diabetes. These include:  · An A1c (hemoglobin A1c) test.  · This test is an average of your glucose (or blood sugar) control over the past 3 months.  · This is recommended as a way for you and your caregiver to understand how well your glucose levels are controlled on the average.  · Your A1c goal will be determined by your caregiver, but it is usually best if it is less than 6.5% to 7.0%.  · Glucose (sugar) attaches itself to red blood cells. The amount of glucose then can then be measured. The amount of glucose on the cells depends on how high your blood glucose has been.  · SMBG test (self-monitoring blood glucose).  · Using a blood glucose monitor (meter) to do SMBG testing is an easy way to monitor the amount of glucose in your blood and can help you improve your control. The monitor will tell you what your blood glucose is at that very moment. Every person with diabetes should have a blood glucose monitor and know how to use it. The better you control your blood  sugar on a daily basis, the better your A1c levels will be.  HOW OFTEN SHOULD I HAVE AN A1C LEVEL?  · Every 3 months if your diabetes is not well controlled or if therapy has changed.  · Every 6 months if you are meeting your treatment goals.  HOW OFTEN SHOULD I DO SMBG TESTING?   Your caregiver will recommend how often you should test. Testing times are based on the kind of medicine you take, type of diabetes you have, and your blood glucose control. Testing times can include:  · Type 1 diabetes: test 3 or 4 times a day or as directed.  · Type 2 diabetes and if you are taking insulin and diabetes pills: test 3 or 4 times a day or as directed.  · If you are taking diabetes pills only and not reaching your target A1c: test 2 to 4 times a day or as directed.  · If you are taking diabetes pills and are controlling your diabetes well with diet and exercise, your caregiver will help you decide what is appropriate.  WHAT TIME OF DAY SHOULD I TEST?   The best time of day to test your blood glucose depends on medications, mealtimes, exercise, and blood glucose control. It is best to test at different times because this will help you know how you are doing throughout the day. Your caregiver will help you decide what is best.  WHAT SHOULD MY BLOOD GLUCOSE BE?  Blood glucose target goals may vary depending on each persons needs, whether they have type 1 or type 2 diabetes or what medications they are taking. However, as a general rule, blood glucose should be:  · Before meals    mg/dl.  · After meals    ..less than 180 mg/dl.  CHECK YOUR BLOOD GLUCOSE IF:  · You have symptoms of low blood sugar (hypoglycemia), which may include dizziness, shaking, sweating, chills and confusion.  · You have symptoms of high blood sugar (hyperglycemia), which may include sleepiness, blurred vision, frequent urination and excessive thirst.  · You are learning how meals, physical activity and medicine affect your blood glucose level. The  more you learn about how various foods, your medications, and activities affect you, the better job you will do of taking care of yourself.  · You have a job in which poor control could cause safety problems while driving or operating machinery.  CHECK YOUR BLOOD SUGAR MORE FREQUENTLY:  · If you have medication or dietary changes.  · If you begin taking other kinds of medicines.  · If you become sick or your level of stress increases. With an illness, your blood sugar may even be high without eating.  · Before and after exercise.  Follow your caregiver's testing recommendations during this time.   TO DISPOSE OF SHARPS:  Each city or state may have different regulations. Check with your public works or waste management department.  · Sharps containers can be purchased from pharmacies.  · Place all used sharps in a container. You do not need to replace any protective covers over the needle or break the needle.  · Sharps should be contained in a ridge, leakproof, puncture-resistant container.  · Plastic detergent bottle.  · Bleach bottle.  · When container is almost full, add a solution that is 1 part laundry bleach and 9 parts tap water (it is okay to use undiluted bleach if you wish). You may want to wear gloves since bleach can damage tissue. Let the solution sit for 30 minutes.  · Carefully pour all the liquid into the sanitary . Be sure to prevent the sharps from falling out.  · Once liquid is drained, reseal the container with lid and tape it shut with duct tape. This will prevent the cap from coming off.  · Dispose of the container with your regular household trash and waste. It is a good idea to let your  know that you will be disposing of sharps.  Document Released: 12/20/2004 Document Revised: 09/11/2013 Document Reviewed: 06/21/2010  ExitCare® Patient Information ©2014 [employer].    Metformin tablets  What is this medicine?  METFORMIN (met FOR min) is used to treat type 2 diabetes. It  helps to control blood sugar. Treatment is combined with diet and exercise. This medicine can be used alone or with other medicines for diabetes.  This medicine may be used for other purposes; ask your health care provider or pharmacist if you have questions.  COMMON BRAND NAME(S): Glucophage  What should I tell my health care provider before I take this medicine?  They need to know if you have any of these conditions:  -anemia  -frequently drink alcohol-containing beverages  -become easily dehydrated  -heart attack  -heart failure that is treated with medications  -kidney disease  -liver disease  -polycystic ovary syndrome  -serious infection or injury  -vomiting  -an unusual or allergic reaction to metformin, other medicines, foods, dyes, or preservatives  -pregnant or trying to get pregnant  -breast-feeding  How should I use this medicine?  Take this medicine by mouth. Take it with meals. Swallow the tablets with a drink of water. Follow the directions on the prescription label. Take your medicine at regular intervals. Do not take your medicine more often than directed.  Talk to your pediatrician regarding the use of this medicine in children. While this drug may be prescribed for children as young as 10 years of age for selected conditions, precautions do apply.  Overdosage: If you think you have taken too much of this medicine contact a poison control center or emergency room at once.  NOTE: This medicine is only for you. Do not share this medicine with others.  What if I miss a dose?  If you miss a dose, take it as soon as you can. If it is almost time for your next dose, take only that dose. Do not take double or extra doses.  What may interact with this medicine?  Do not take this medicine with any of the following medications:  -dofetilide  -gatifloxacin  -certain contrast medicines given before X-rays, CT scans, MRI, or other procedures  This medicine may also interact with the following  medications:  -digoxin  -diuretics  -female hormones, like estrogens or progestins and birth control pills  -isoniazid  -medicines for blood pressure, heart disease, irregular heart beat  -morphine  -nicotinic acid  -phenothiazines like chlorpromazine, mesoridazine, prochlorperazine, thioridazine  -phenytoin  -procainamide  -quinidine  -quinine  -ranitidine  -steroid medicines like prednisone or cortisone  -stimulant medicines for attention disorders, weight loss, or to stay awake  -thyroid medicines  -trimethoprim  -vancomycin  This list may not describe all possible interactions. Give your health care provider a list of all the medicines, herbs, non-prescription drugs, or dietary supplements you use. Also tell them if you smoke, drink alcohol, or use illegal drugs. Some items may interact with your medicine.  What should I watch for while using this medicine?  Visit your doctor or health care professional for regular checks on your progress.  A test called the HbA1C (A1C) will be monitored. This is a simple blood test. It measures your blood sugar control over the last 2 to 3 months. You will receive this test every 3 to 6 months.  Learn how to check your blood sugar. Learn the symptoms of low and high blood sugar and how to manage them.  Always carry a quick-source of sugar with you in case you have symptoms of low blood sugar. Examples include hard sugar candy or glucose tablets. Make sure others know that you can choke if you eat or drink when you develop serious symptoms of low blood sugar, such as seizures or unconsciousness. They must get medical help at once.  Tell your doctor or health care professional if you have high blood sugar. You might need to change the dose of your medicine. If you are sick or exercising more than usual, you might need to change the dose of your medicine.  Do not skip meals. Ask your doctor or health care professional if you should avoid alcohol. Many nonprescription cough and  cold products contain sugar or alcohol. These can affect blood sugar.  This medicine may cause ovulation in premenopausal women who do not have regular monthly periods. This may increase your chances of becoming pregnant. You should not take this medicine if you become pregnant or think you may be pregnant. Talk with your doctor or health care professional about your birth control options while taking this medicine. Contact your doctor or health care professional right away if think you are pregnant.  If you are going to need surgery, a MRI, CT scan, or other procedure, tell your doctor that you are taking this medicine. You may need to stop taking this medicine before the procedure.  Wear a medical ID bracelet or chain, and carry a card that describes your disease and details of your medicine and dosage times.  What side effects may I notice from receiving this medicine?  Side effects that you should report to your doctor or health care professional as soon as possible:  -allergic reactions like skin rash, itching or hives, swelling of the face, lips, or tongue  -breathing problems  -feeling faint or lightheaded, falls  -muscle aches or pains  -signs and symptoms of low blood sugar such as feeling anxious, confusion, dizziness, increased hunger, unusually weak or tired, sweating, shakiness, cold, irritable, headache, blurred vision, fast heartbeat, loss of consciousness  -slow or irregular heartbeat  -unusual stomach pain or discomfort  -unusually tired or weak  Side effects that usually do not require medical attention (report to your doctor or health care professional if they continue or are bothersome):  -diarrhea  -headache  -heartburn  -metallic taste in mouth  -nausea  -stomach gas, upset  This list may not describe all possible side effects. Call your doctor for medical advice about side effects. You may report side effects to FDA at 8-599-FDA-1698.  Where should I keep my medicine?  Keep out of the reach of  children.  Store at room temperature between 15 and 30 degrees C (59 and 86 degrees F). Protect from moisture and light. Throw away any unused medicine after the expiration date.  NOTE: This sheet is a summary. It may not cover all possible information. If you have questions about this medicine, talk to your doctor, pharmacist, or health care provider.  © 2014, Elsevier/Gold Standard. (4/1/2014 4:03:44 PM)      Your appointments     Oct 06, 2017 11:15 AM   Annual Exam with Ciara Silva M.D.   [facility])    [address] [facility] [address]  [facility] [id number] [phone]              Follow-up Information     1. Follow up with CLIFTON Nair. Go on 8/22/2017.    Specialty:  Family Medicine    Why:  Please arrive at 8:00 am for a 8:30 am appointment. Thank you. Discuss need for neurology referral for MRI w/ Sedation     Contact information    [address] [facility] [id number] [phone]           Discharge Medication Instructions:    Below are the medications your physician expects you to take upon discharge:    Review all your home medications and newly ordered medications with your doctor and/or pharmacist. Follow medication instructions as directed by your doctor and/or pharmacist.    Please keep your medication list with you and share with your physician.               Medication List      START taking these medications        Instructions    Morning Afternoon Evening Bedtime    meclizine 25 MG Tabs   Last time this was given:  25 mg on 8/15/2017  3:23 PM   Commonly known as:  ANTIVERT        Take 1 Tab by mouth 3 times a day for 5 days.   Dose:  25 mg                        metformin 1000 MG tablet   Commonly known as:  GLUCOPHAGE        Take 1 Tab by mouth 2 times a day, with meals.   Dose:  1000 mg                        * methylPREDNISolone 4 MG Tabs   Last time this was given:  4 mg on 8/15/2017  3:23 PM   Commonly known as:  MEDROL         Take 4mg PO for 7 days then 2mg PO for 7 days                        * methylPREDNISolone 2 MG Tabs   Last time this was given:  4 mg on 8/15/2017  3:23 PM   Commonly known as:  MEDROL        Take 0.5 Tabs by mouth every day for 7 days. After take 4mg x 7 days then 2mg for 7 days   Dose:  1 mg                        * Notice:  This list has 2 medication(s) that are the same as other medications prescribed for you. Read the directions carefully, and ask your doctor or other care provider to review them with you.      CONTINUE taking these medications        Instructions    Morning Afternoon Evening Bedtime    norgestimate-ethinyl estradiol 0.25-35 MG-MCG per tablet   Commonly known as:  ORTHO-CYCLEN        Take 1 Tab by mouth every day.   Dose:  1 Tab                             Where to Get Your Medications      These medications were sent to [facility] #[id number] - [place] - [address]  [address] [place] [address]     Phone:  [phone]    - metformin 1000 MG tablet  - methylPREDNISolone 2 MG Tabs  - methylPREDNISolone 4 MG Tabs      Information about where to get these medications is not yet available     ! Ask your nurse or doctor about these medications    - meclizine 25 MG Tabs            Instructions           Diet / Nutrition:    Follow any diet instructions given to you by your doctor or the dietician, including how much salt (sodium) you are allowed each day.    If you are overweight, talk to your doctor about a weight reduction plan.    Activity:    Remain physically active following your doctor's instructions about exercise and activity.    Rest often.     Any time you become even a little tired or short of breath, SIT DOWN and rest.    Worsening Symptoms:    Report any of the following signs and symptoms to the doctor's office immediately:    *Pain of jaw, arm, or neck  *Chest pain not relieved by medication                               *Dizziness or loss of consciousness  *Difficulty  breathing even when at rest   *More tired than usual                                       *Bleeding drainage or swelling of surgical site  *Swelling of feet, ankles, legs or stomach                 *Fever (>100ºF)  *Pink or blood tinged sputum  *Weight gain (3lbs/day or 5lbs /week)           *Shock from internal defibrillator (if applicable)  *Palpitations or irregular heartbeats                *Cool and/or numb extremities    Stroke Awareness    Common Risk Factors for Stroke include:    Age  Atrial Fibrillation  Carotid Artery Stenosis  Diabetes Mellitus  Excessive alcohol consumption  High blood pressure  Overweight   Physical inactivity  Smoking    Warning signs and symptoms of a stroke include:    *Sudden numbness or weakness of the face, arm or leg (especially on one side of the body).  *Sudden confusion, trouble speaking or understanding.  *Sudden trouble seeing in one or both eyes.  *Sudden trouble walking, dizziness, loss of balance or coordination.Sudden severe headache with no known cause.    It is very important to get treatment quickly when a stroke occurs. If you experience any of the above warning signs, call 911 immediately.                   Disclaimer         Quit Smoking / Tobacco Use:    I understand the use of any tobacco products increases my chance of suffering from future heart disease or stroke and could cause other illnesses which may shorten my life. Quitting the use of tobacco products is the single most important thing I can do to improve my health. For further information on smoking / tobacco cessation call a Toll Free Quit Line at 1-180.716.1054 (*[employer] [place]) or 1-335.759.8437 ([employer]) or you can access the web based program at www.lungusa.org.    [place] Tobacco Users Help Line:  (959) 829-9482       Toll Free: 1-263.830.7131  Quit Tobacco Program [facility] (522)027-9858    Crisis Hotline:    [place] Crisis Hotline:   6-322-AAWTBJC or 1-568.181.5188    [place] Crisis Hotline:    1-797.446.8062 or 067-255-9621    Discharge Survey:   Thank you for choosing [facility]. We hope we did everything we could to make your hospital stay a pleasant one. You may be receiving a phone survey and we would appreciate your time and participation in answering the questions. Your input is very valuable to us in our efforts to improve our service to our patients and their families.        My signature on this form indicates that:    1. I have reviewed and understand the above information.  2. My questions regarding this information have been answered to my satisfaction.  3. I have formulated a plan with my discharge nurse to obtain my prescribed medications for home.                  Disclaimer         __________________________________                     __________       ________                       Patient Signature                                                 Date                    Time

## 2017-08-15 ENCOUNTER — PATIENT OUTREACH (OUTPATIENT)
Dept: HEALTH INFORMATION MANAGEMENT | Facility: OTHER | Age: 37
End: 2017-08-15

## 2017-08-15 ENCOUNTER — HOSPITAL ENCOUNTER (OUTPATIENT)
Dept: RADIOLOGY | Facility: MEDICAL CENTER | Age: 37
End: 2017-08-15
Attending: INTERNAL MEDICINE

## 2017-08-15 VITALS
WEIGHT: 282.41 LBS | OXYGEN SATURATION: 97 % | BODY MASS INDEX: 42.8 KG/M2 | DIASTOLIC BLOOD PRESSURE: 68 MMHG | SYSTOLIC BLOOD PRESSURE: 143 MMHG | TEMPERATURE: 98.1 F | RESPIRATION RATE: 18 BRPM | HEART RATE: 73 BPM | HEIGHT: 68 IN

## 2017-08-15 PROBLEM — D64.9 NORMOCYTIC ANEMIA: Status: ACTIVE | Noted: 2017-08-15

## 2017-08-15 PROBLEM — E11.9 DM2 (DIABETES MELLITUS, TYPE 2) (HCC): Status: ACTIVE | Noted: 2017-08-14

## 2017-08-15 LAB
ALBUMIN SERPL BCP-MCNC: 3.2 G/DL (ref 3.2–4.9)
ALBUMIN/GLOB SERPL: 1.1 G/DL
ALP SERPL-CCNC: 46 U/L (ref 30–99)
ALT SERPL-CCNC: 9 U/L (ref 2–50)
ANION GAP SERPL CALC-SCNC: 7 MMOL/L (ref 0–11.9)
AST SERPL-CCNC: 13 U/L (ref 12–45)
BASOPHILS # BLD AUTO: 0.6 % (ref 0–1.8)
BASOPHILS # BLD: 0.04 K/UL (ref 0–0.12)
BILIRUB SERPL-MCNC: 0.3 MG/DL (ref 0.1–1.5)
BUN SERPL-MCNC: 9 MG/DL (ref 8–22)
CALCIUM SERPL-MCNC: 8.2 MG/DL (ref 8.5–10.5)
CHLORIDE SERPL-SCNC: 106 MMOL/L (ref 96–112)
CHOLEST SERPL-MCNC: 139 MG/DL (ref 100–199)
CO2 SERPL-SCNC: 23 MMOL/L (ref 20–33)
CREAT SERPL-MCNC: 0.61 MG/DL (ref 0.5–1.4)
EOSINOPHIL # BLD AUTO: 0.09 K/UL (ref 0–0.51)
EOSINOPHIL NFR BLD: 1.5 % (ref 0–6.9)
ERYTHROCYTE [DISTWIDTH] IN BLOOD BY AUTOMATED COUNT: 47.9 FL (ref 35.9–50)
EST. AVERAGE GLUCOSE BLD GHB EST-MCNC: 166 MG/DL
FERRITIN SERPL-MCNC: 29.1 NG/ML (ref 10–291)
GFR SERPL CREATININE-BSD FRML MDRD: >60 ML/MIN/1.73 M 2
GLOBULIN SER CALC-MCNC: 2.8 G/DL (ref 1.9–3.5)
GLUCOSE SERPL-MCNC: 154 MG/DL (ref 65–99)
HBA1C MFR BLD: 7.4 % (ref 0–5.6)
HCT VFR BLD AUTO: 37.3 % (ref 37–47)
HDLC SERPL-MCNC: 34 MG/DL
HGB BLD-MCNC: 11.8 G/DL (ref 12–16)
IMM GRANULOCYTES # BLD AUTO: 0.02 K/UL (ref 0–0.11)
IMM GRANULOCYTES NFR BLD AUTO: 0.3 % (ref 0–0.9)
IRON SATN MFR SERPL: 9 % (ref 15–55)
IRON SERPL-MCNC: 43 UG/DL (ref 40–170)
LDLC SERPL CALC-MCNC: 51 MG/DL
LYMPHOCYTES # BLD AUTO: 2.23 K/UL (ref 1–4.8)
LYMPHOCYTES NFR BLD: 36.2 % (ref 22–41)
MCH RBC QN AUTO: 30.2 PG (ref 27–33)
MCHC RBC AUTO-ENTMCNC: 31.6 G/DL (ref 33.6–35)
MCV RBC AUTO: 95.4 FL (ref 81.4–97.8)
MONOCYTES # BLD AUTO: 0.47 K/UL (ref 0–0.85)
MONOCYTES NFR BLD AUTO: 7.6 % (ref 0–13.4)
NEUTROPHILS # BLD AUTO: 3.31 K/UL (ref 2–7.15)
NEUTROPHILS NFR BLD: 53.8 % (ref 44–72)
NRBC # BLD AUTO: 0 K/UL
NRBC BLD AUTO-RTO: 0 /100 WBC
PLATELET # BLD AUTO: 119 K/UL (ref 164–446)
PMV BLD AUTO: 11.8 FL (ref 9–12.9)
POTASSIUM SERPL-SCNC: 4.1 MMOL/L (ref 3.6–5.5)
PROT SERPL-MCNC: 6 G/DL (ref 6–8.2)
RBC # BLD AUTO: 3.91 M/UL (ref 4.2–5.4)
SODIUM SERPL-SCNC: 136 MMOL/L (ref 135–145)
TIBC SERPL-MCNC: 482 UG/DL (ref 250–450)
TRIGL SERPL-MCNC: 270 MG/DL (ref 0–149)
WBC # BLD AUTO: 6.2 K/UL (ref 4.8–10.8)

## 2017-08-15 PROCEDURE — 700102 HCHG RX REV CODE 250 W/ 637 OVERRIDE(OP): Performed by: INTERNAL MEDICINE

## 2017-08-15 PROCEDURE — 80053 COMPREHEN METABOLIC PANEL: CPT

## 2017-08-15 PROCEDURE — 82728 ASSAY OF FERRITIN: CPT

## 2017-08-15 PROCEDURE — G8978 MOBILITY CURRENT STATUS: HCPCS | Mod: CJ

## 2017-08-15 PROCEDURE — G0378 HOSPITAL OBSERVATION PER HR: HCPCS

## 2017-08-15 PROCEDURE — 700105 HCHG RX REV CODE 258: Performed by: INTERNAL MEDICINE

## 2017-08-15 PROCEDURE — 97161 PT EVAL LOW COMPLEX 20 MIN: CPT

## 2017-08-15 PROCEDURE — G8979 MOBILITY GOAL STATUS: HCPCS | Mod: CI

## 2017-08-15 PROCEDURE — 99217 PR OBSERVATION CARE DISCHARGE: CPT | Performed by: INTERNAL MEDICINE

## 2017-08-15 PROCEDURE — 96361 HYDRATE IV INFUSION ADD-ON: CPT

## 2017-08-15 PROCEDURE — 83540 ASSAY OF IRON: CPT

## 2017-08-15 PROCEDURE — A9270 NON-COVERED ITEM OR SERVICE: HCPCS | Performed by: INTERNAL MEDICINE

## 2017-08-15 PROCEDURE — 80061 LIPID PANEL: CPT

## 2017-08-15 PROCEDURE — 36415 COLL VENOUS BLD VENIPUNCTURE: CPT

## 2017-08-15 PROCEDURE — 85025 COMPLETE CBC W/AUTO DIFF WBC: CPT

## 2017-08-15 PROCEDURE — 83550 IRON BINDING TEST: CPT

## 2017-08-15 RX ORDER — METHYLPREDNISOLONE 4 MG/1
TABLET ORAL
Qty: 11 TAB | Refills: 0 | Status: SHIPPED | OUTPATIENT
Start: 2017-08-15 | End: 2018-11-27

## 2017-08-15 RX ORDER — MECLIZINE HYDROCHLORIDE 25 MG/1
25 TABLET ORAL 3 TIMES DAILY
Qty: 15 TAB | Refills: 0 | Status: SHIPPED | OUTPATIENT
Start: 2017-08-15 | End: 2017-08-20

## 2017-08-15 RX ORDER — METHYLPREDNISOLONE 4 MG/1
4 TABLET ORAL DAILY
Status: DISCONTINUED | OUTPATIENT
Start: 2017-08-15 | End: 2017-08-15 | Stop reason: HOSPADM

## 2017-08-15 RX ADMIN — MECLIZINE HYDROCHLORIDE 25 MG: 25 TABLET ORAL at 09:29

## 2017-08-15 RX ADMIN — MECLIZINE HYDROCHLORIDE 25 MG: 25 TABLET ORAL at 15:23

## 2017-08-15 RX ADMIN — SODIUM CHLORIDE: 9 INJECTION, SOLUTION INTRAVENOUS at 01:50

## 2017-08-15 RX ADMIN — METHYLPREDNISOLONE 4 MG: 4 TABLET ORAL at 15:23

## 2017-08-15 ASSESSMENT — COGNITIVE AND FUNCTIONAL STATUS - GENERAL
TURNING FROM BACK TO SIDE WHILE IN FLAT BAD: A LITTLE
SUGGESTED CMS G CODE MODIFIER MOBILITY: CK
MOVING TO AND FROM BED TO CHAIR: A LITTLE
CLIMB 3 TO 5 STEPS WITH RAILING: A LITTLE
STANDING UP FROM CHAIR USING ARMS: A LITTLE
MOBILITY SCORE: 18
WALKING IN HOSPITAL ROOM: A LITTLE
MOVING FROM LYING ON BACK TO SITTING ON SIDE OF FLAT BED: A LITTLE

## 2017-08-15 ASSESSMENT — GAIT ASSESSMENTS
DISTANCE (FEET): 150
GAIT LEVEL OF ASSIST: CONTACT GUARD ASSIST

## 2017-08-15 ASSESSMENT — PAIN SCALES - GENERAL: PAINLEVEL_OUTOF10: ASSUMED PAIN PRESENT

## 2017-08-15 NOTE — THERAPY
"Physical Therapy Evaluation completed.   Bed Mobility:  Supine to Sit: Stand by Assist  Transfers: Sit to Stand: Stand by Assist  Gait: Level Of Assist: Contact Guard Assist with No Equipment Needed       Plan of Care: Will benefit from Physical Therapy 2 times per week and Plan to complete next treatment by Thursday 8/17   Discharge Recommendations: Equipment: Will Continue to Assess for Equipment Needs. Post-acute therapy Discharge to home with outpatient or home health for additional skilled therapy services.    Pt is a 37 year old female admitted to the hospital due to weakness and dizziness. Pt reports that prior to admit she was independent with mobility and ADL's and onset of dizziness was sudden but lasted a few days. Pt is unable to state trigger for onset of dizziness, stating \"It started yesterday while I was just lying in bed. \" Completed mobility assessment and overall pt doing fairly with mobility. Performing bed mobility and transfers with SBA, ambulating with CGA. Pt with wide DEA and lateral trunk sway with ambulation and stating her vision was \"blurry\"May benefit from use of FWW or SPC. Assessed pt for BPPV. Pt did not have any nystagmus with [place] in either direction but did report mild to moderate dizziness with positional change from supine<-->sitting with head rotated to the R. No nystagmus with horizontal tracking noted, however, mild nystagmus to the R with slow neck rotations to the R. Will continue to follow pt 2x/week while in the acute care setting to address balance and gait deviations and ongoing assessment for dizziness    See \"Rehab Therapy-Acute\" Patient Summary Report for complete documentation.     "

## 2017-08-15 NOTE — ASSESSMENT & PLAN NOTE
Profound.  I suspect viral labyrinthitis.  Schedule meclizine for now. IV fluid hydration. Conservative management.  Noted to have profound nystagmus in horizontal gaze.  Recommend MRI of the brain to rule out any posterior circulation stroke or space-occupying lesion.  Physical therapy evaluation.

## 2017-08-15 NOTE — DISCHARGE INSTRUCTIONS
Discharge Instructions    Discharged to home by car with relative. Discharged via wheelchair, hospital escort: Yes.  Special equipment needed: Not Applicable    Be sure to schedule a follow-up appointment with your primary care doctor or any specialists as instructed.     Discharge Plan:   Diet Plan: Discussed  Activity Level: Discussed  Confirmed Follow up Appointment: Appointment Scheduled  Confirmed Symptoms Management: Discussed  Medication Reconciliation Updated: Yes  Influenza Vaccine Indication: Patient Refuses    I understand that a diet low in cholesterol, fat, and sodium is recommended for good health. Unless I have been given specific instructions below for another diet, I accept this instruction as my diet prescription.   Other diet: Heart Healthy    Special Instructions:   Activity as tolerated  Diet: Heart Healthy   Take Rx as prescribed  F/u with PCP within 10 days, if symptoms continue get a referral and follow up with neurology and get a MRI Brain scheduled with sedation  Call Primary Care MD if new problems arise   Return to ER/hospital for SOB, CP, or worsening/concerning symptoms    · Is patient discharged on Warfarin / Coumadin?   No     · Is patient Post Blood Transfusion?  No    Depression / Suicide Risk    As you are discharged from this [facility] facility, it is important to learn how to keep safe from harming yourself.    Recognize the warning signs:  · Abrupt changes in personality, positive or negative- including increase in energy   · Giving away possessions  · Change in eating patterns- significant weight changes-  positive or negative  · Change in sleeping patterns- unable to sleep or sleeping all the time   · Unwillingness or inability to communicate  · Depression  · Unusual sadness, discouragement and loneliness  · Talk of wanting to die  · Neglect of personal appearance   · Rebelliousness- reckless behavior  · Withdrawal from people/activities they love  · Confusion- inability  to concentrate     If you or a loved one observes any of these behaviors or has concerns about self-harm, here's what you can do:  · Talk about it- your feelings and reasons for harming yourself  · Remove any means that you might use to hurt yourself (examples: pills, rope, extension cords, firearm)  · Get professional help from the community (Mental Health, Substance Abuse, psychological counseling)  · Do not be alone:Call your Safe Contact- someone whom you trust who will be there for you.  · Call your local CRISIS HOTLINE 431-5481 or 639-542-2341  · Call your local Children's Mobile Crisis Response Team [place] (707) 837-5870 or www.[employer]  · Call the toll free National Suicide Prevention Hotlines   · National Suicide Prevention Lifeline 224-650-PGZS (8067)  · DisabledPark Line Network 800-SUICIDE (930-2480)    Vertigo  Vertigo means you feel like you or your surroundings are moving when they are not. Vertigo can be dangerous if it occurs when you are at work, driving, or performing difficult activities.   CAUSES   Vertigo occurs when there is a conflict of signals sent to your brain from the visual and sensory systems in your body. There are many different causes of vertigo, including:  · Infections, especially in the inner ear.  · A bad reaction to a drug or misuse of alcohol and medicines.  · Withdrawal from drugs or alcohol.  · Rapidly changing positions, such as lying down or rolling over in bed.  · A migraine headache.  · Decreased blood flow to the brain.  · Increased pressure in the brain from a head injury, infection, tumor, or bleeding.  SYMPTOMS   You may feel as though the world is spinning around or you are falling to the ground. Because your balance is upset, vertigo can cause nausea and vomiting. You may have involuntary eye movements (nystagmus).  DIAGNOSIS   Vertigo is usually diagnosed by physical exam. If the cause of your vertigo is unknown, your caregiver may perform imaging  tests, such as an MRI scan (magnetic resonance imaging).  TREATMENT   Most cases of vertigo resolve on their own, without treatment. Depending on the cause, your caregiver may prescribe certain medicines. If your vertigo is related to body position issues, your caregiver may recommend movements or procedures to correct the problem. In rare cases, if your vertigo is caused by certain inner ear problems, you may need surgery.  HOME CARE INSTRUCTIONS   · Follow your caregiver's instructions.  · Avoid driving.  · Avoid operating heavy machinery.  · Avoid performing any tasks that would be dangerous to you or others during a vertigo episode.  · Tell your caregiver if you notice that certain medicines seem to be causing your vertigo. Some of the medicines used to treat vertigo episodes can actually make them worse in some people.  SEEK IMMEDIATE MEDICAL CARE IF:   · Your medicines do not relieve your vertigo or are making it worse.  · You develop problems with talking, walking, weakness, or using your arms, hands, or legs.  · You develop severe headaches.  · Your nausea or vomiting continues or gets worse.  · You develop visual changes.  · A family member notices behavioral changes.  · Your condition gets worse.  MAKE SURE YOU:  · Understand these instructions.  · Will watch your condition.  · Will get help right away if you are not doing well or get worse.     This information is not intended to replace advice given to you by your health care provider. Make sure you discuss any questions you have with your health care provider.     Document Released: 09/27/2006 Document Revised: 03/11/2013 Document Reviewed: 04/11/2016  "[employer] Interactive Patient Education ©2016 "[employer] Inc.      Meclizine tablets or capsules  What is this medicine?  MECLIZINE ([place] solo) is an antihistamine. It is used to prevent nausea, vomiting, or dizziness caused by motion sickness. It is also used to prevent and treat vertigo (extreme  dizziness or a feeling that you or your surroundings are tilting or spinning around).  This medicine may be used for other purposes; ask your health care provider or pharmacist if you have questions.  COMMON BRAND NAME(S): Antivert, Dramamine Less Drowsy, Medivert, Meni-D   What should I tell my health care provider before I take this medicine?  They need to know if you have any of these conditions:  -asthma  -glaucoma  -prostate trouble  -stomach problems  -urinary problems  -an unusual or allergic reaction to meclizine, other medicines, foods, dyes, or preservatives  -pregnant or trying to get pregnant  -breast-feeding  How should I use this medicine?  Take this medicine by mouth with a glass of water. Follow the directions on the prescription label. If you are using this medicine to prevent motion sickness, take the dose at least 1 hour before travel. If it upsets your stomach, take it with food or milk. Take your doses at regular intervals. Do not take your medicine more often than directed.  Talk to your pediatrician regarding the use of this medicine in children. Special care may be needed.  Overdosage: If you think you have taken too much of this medicine contact a poison control center or emergency room at once.  NOTE: This medicine is only for you. Do not share this medicine with others.  What if I miss a dose?  If you miss a dose, take it as soon as you can. If it is almost time for your next dose, take only that dose. Do not take double or extra doses.  What may interact with this medicine?  -barbiturate medicines for inducing sleep or treating seizures  -digoxin  -medicines for anxiety or sleeping problems, like alprazolam, diazepam or temazepam  -medicines for hay fever and other allergies  -medicines for mental depression  -medicines for movement abnormalities as in Parkinson's disease, or for stomach problems  -medicines for pain  -medicines that relax muscles  This list may not describe all possible  interactions. Give your health care provider a list of all the medicines, herbs, non-prescription drugs, or dietary supplements you use. Also tell them if you smoke, drink alcohol, or use illegal drugs. Some items may interact with your medicine.  What should I watch for while using this medicine?  If you are taking this medicine on a regular schedule, visit your doctor or health care professional for regular checks on your progress.  You may get dizzy, drowsy or have blurred vision. Do not drive, use machinery, or do anything that needs mental alertness until you know how this medicine affects you. Do not stand or sit up quickly, especially if you are an older patient. This reduces the risk of dizzy or fainting spells. Alcohol can increase possible dizziness. Avoid alcoholic drinks.  Your mouth may get dry. Chewing sugarless gum or sucking hard candy, and drinking plenty of water may help. Contact your doctor if the problem does not go away or is severe.  This medicine may cause dry eyes and blurred vision. If you wear contact lenses you may feel some discomfort. Lubricating drops may help. See your eye doctor if the problem does not go away or is severe.  What side effects may I notice from receiving this medicine?  Side effects that you should report to your doctor or health care professional as soon as possible:  -fainting spells  -fast or irregular heartbeat  Side effects that usually do not require medical attention (report to your doctor or health care professional if they continue or are bothersome):  -constipation  -difficulty passing urine  -difficulty sleeping  -headache  -stomach upset  This list may not describe all possible side effects. Call your doctor for medical advice about side effects. You may report side effects to FDA at 8-266-FDA-4751.  Where should I keep my medicine?  Keep out of the reach of children.  Store at room temperature between 15 and 30 degrees C (59 and 86 degrees F). Keep  container tightly closed. Throw away any unused medicine after the expiration date.  NOTE: This sheet is a summary. It may not cover all possible information. If you have questions about this medicine, talk to your doctor, pharmacist, or health care provider.  © 2014, [employer]vier/[employer]. (6/25/2009 10:35:36 AM)      Monitoring for Diabetes  There are two blood tests that help you monitor and manage your diabetes. These include:  · An A1c (hemoglobin A1c) test.  · This test is an average of your glucose (or blood sugar) control over the past 3 months.  · This is recommended as a way for you and your caregiver to understand how well your glucose levels are controlled on the average.  · Your A1c goal will be determined by your caregiver, but it is usually best if it is less than 6.5% to 7.0%.  · Glucose (sugar) attaches itself to red blood cells. The amount of glucose then can then be measured. The amount of glucose on the cells depends on how high your blood glucose has been.  · SMBG test (self-monitoring blood glucose).  · Using a blood glucose monitor (meter) to do SMBG testing is an easy way to monitor the amount of glucose in your blood and can help you improve your control. The monitor will tell you what your blood glucose is at that very moment. Every person with diabetes should have a blood glucose monitor and know how to use it. The better you control your blood sugar on a daily basis, the better your A1c levels will be.  HOW OFTEN SHOULD I HAVE AN A1C LEVEL?  · Every 3 months if your diabetes is not well controlled or if therapy has changed.  · Every 6 months if you are meeting your treatment goals.  HOW OFTEN SHOULD I DO SMBG TESTING?   Your caregiver will recommend how often you should test. Testing times are based on the kind of medicine you take, type of diabetes you have, and your blood glucose control. Testing times can include:  · Type 1 diabetes: test 3 or 4 times a day or as directed.  · Type 2  diabetes and if you are taking insulin and diabetes pills: test 3 or 4 times a day or as directed.  · If you are taking diabetes pills only and not reaching your target A1c: test 2 to 4 times a day or as directed.  · If you are taking diabetes pills and are controlling your diabetes well with diet and exercise, your caregiver will help you decide what is appropriate.  WHAT TIME OF DAY SHOULD I TEST?   The best time of day to test your blood glucose depends on medications, mealtimes, exercise, and blood glucose control. It is best to test at different times because this will help you know how you are doing throughout the day. Your caregiver will help you decide what is best.  WHAT SHOULD MY BLOOD GLUCOSE BE?  Blood glucose target goals may vary depending on each persons needs, whether they have type 1 or type 2 diabetes or what medications they are taking. However, as a general rule, blood glucose should be:  · Before meals    mg/dl.  · After meals    ..less than 180 mg/dl.  CHECK YOUR BLOOD GLUCOSE IF:  · You have symptoms of low blood sugar (hypoglycemia), which may include dizziness, shaking, sweating, chills and confusion.  · You have symptoms of high blood sugar (hyperglycemia), which may include sleepiness, blurred vision, frequent urination and excessive thirst.  · You are learning how meals, physical activity and medicine affect your blood glucose level. The more you learn about how various foods, your medications, and activities affect you, the better job you will do of taking care of yourself.  · You have a job in which poor control could cause safety problems while driving or operating machinery.  CHECK YOUR BLOOD SUGAR MORE FREQUENTLY:  · If you have medication or dietary changes.  · If you begin taking other kinds of medicines.  · If you become sick or your level of stress increases. With an illness, your blood sugar may even be high without eating.  · Before and after exercise.  Follow your  caregiver's testing recommendations during this time.   TO DISPOSE OF SHARPS:  Each city or state may have different regulations. Check with your public works or waste management department.  · Sharps containers can be purchased from pharmacies.  · Place all used sharps in a container. You do not need to replace any protective covers over the needle or break the needle.  · Sharps should be contained in a ridge, leakproof, puncture-resistant container.  · Plastic detergent bottle.  · Bleach bottle.  · When container is almost full, add a solution that is 1 part laundry bleach and 9 parts tap water (it is okay to use undiluted bleach if you wish). You may want to wear gloves since bleach can damage tissue. Let the solution sit for 30 minutes.  · Carefully pour all the liquid into the sanitary . Be sure to prevent the sharps from falling out.  · Once liquid is drained, reseal the container with lid and tape it shut with duct tape. This will prevent the cap from coming off.  · Dispose of the container with your regular household trash and waste. It is a good idea to let your  know that you will be disposing of sharps.  Document Released: 12/20/2004 Document Revised: 09/11/2013 Document Reviewed: 06/21/2010  [employer]® Patient Information ©2014 [employer].    Metformin tablets  What is this medicine?  METFORMIN (met FOR min) is used to treat type 2 diabetes. It helps to control blood sugar. Treatment is combined with diet and exercise. This medicine can be used alone or with other medicines for diabetes.  This medicine may be used for other purposes; ask your health care provider or pharmacist if you have questions.  COMMON BRAND NAME(S): Glucophage  What should I tell my health care provider before I take this medicine?  They need to know if you have any of these conditions:  -anemia  -frequently drink alcohol-containing beverages  -become easily dehydrated  -heart attack  -heart failure that is  treated with medications  -kidney disease  -liver disease  -polycystic ovary syndrome  -serious infection or injury  -vomiting  -an unusual or allergic reaction to metformin, other medicines, foods, dyes, or preservatives  -pregnant or trying to get pregnant  -breast-feeding  How should I use this medicine?  Take this medicine by mouth. Take it with meals. Swallow the tablets with a drink of water. Follow the directions on the prescription label. Take your medicine at regular intervals. Do not take your medicine more often than directed.  Talk to your pediatrician regarding the use of this medicine in children. While this drug may be prescribed for children as young as 10 years of age for selected conditions, precautions do apply.  Overdosage: If you think you have taken too much of this medicine contact a poison control center or emergency room at once.  NOTE: This medicine is only for you. Do not share this medicine with others.  What if I miss a dose?  If you miss a dose, take it as soon as you can. If it is almost time for your next dose, take only that dose. Do not take double or extra doses.  What may interact with this medicine?  Do not take this medicine with any of the following medications:  -dofetilide  -gatifloxacin  -certain contrast medicines given before X-rays, CT scans, MRI, or other procedures  This medicine may also interact with the following medications:  -digoxin  -diuretics  -female hormones, like estrogens or progestins and birth control pills  -isoniazid  -medicines for blood pressure, heart disease, irregular heart beat  -morphine  -nicotinic acid  -phenothiazines like chlorpromazine, mesoridazine, prochlorperazine, thioridazine  -phenytoin  -procainamide  -quinidine  -quinine  -ranitidine  -steroid medicines like prednisone or cortisone  -stimulant medicines for attention disorders, weight loss, or to stay awake  -thyroid medicines  -trimethoprim  -vancomycin  This list may not describe  all possible interactions. Give your health care provider a list of all the medicines, herbs, non-prescription drugs, or dietary supplements you use. Also tell them if you smoke, drink alcohol, or use illegal drugs. Some items may interact with your medicine.  What should I watch for while using this medicine?  Visit your doctor or health care professional for regular checks on your progress.  A test called the HbA1C (A1C) will be monitored. This is a simple blood test. It measures your blood sugar control over the last 2 to 3 months. You will receive this test every 3 to 6 months.  Learn how to check your blood sugar. Learn the symptoms of low and high blood sugar and how to manage them.  Always carry a quick-source of sugar with you in case you have symptoms of low blood sugar. Examples include hard sugar candy or glucose tablets. Make sure others know that you can choke if you eat or drink when you develop serious symptoms of low blood sugar, such as seizures or unconsciousness. They must get medical help at once.  Tell your doctor or health care professional if you have high blood sugar. You might need to change the dose of your medicine. If you are sick or exercising more than usual, you might need to change the dose of your medicine.  Do not skip meals. Ask your doctor or health care professional if you should avoid alcohol. Many nonprescription cough and cold products contain sugar or alcohol. These can affect blood sugar.  This medicine may cause ovulation in premenopausal women who do not have regular monthly periods. This may increase your chances of becoming pregnant. You should not take this medicine if you become pregnant or think you may be pregnant. Talk with your doctor or health care professional about your birth control options while taking this medicine. Contact your doctor or health care professional right away if think you are pregnant.  If you are going to need surgery, a MRI, CT scan, or other  procedure, tell your doctor that you are taking this medicine. You may need to stop taking this medicine before the procedure.  Wear a medical ID bracelet or chain, and carry a card that describes your disease and details of your medicine and dosage times.  What side effects may I notice from receiving this medicine?  Side effects that you should report to your doctor or health care professional as soon as possible:  -allergic reactions like skin rash, itching or hives, swelling of the face, lips, or tongue  -breathing problems  -feeling faint or lightheaded, falls  -muscle aches or pains  -signs and symptoms of low blood sugar such as feeling anxious, confusion, dizziness, increased hunger, unusually weak or tired, sweating, shakiness, cold, irritable, headache, blurred vision, fast heartbeat, loss of consciousness  -slow or irregular heartbeat  -unusual stomach pain or discomfort  -unusually tired or weak  Side effects that usually do not require medical attention (report to your doctor or health care professional if they continue or are bothersome):  -diarrhea  -headache  -heartburn  -metallic taste in mouth  -nausea  -stomach gas, upset  This list may not describe all possible side effects. Call your doctor for medical advice about side effects. You may report side effects to FDA at 4-339-FDA-2885.  Where should I keep my medicine?  Keep out of the reach of children.  Store at room temperature between 15 and 30 degrees C (59 and 86 degrees F). Protect from moisture and light. Throw away any unused medicine after the expiration date.  NOTE: This sheet is a summary. It may not cover all possible information. If you have questions about this medicine, talk to your doctor, pharmacist, or health care provider.  © 2014, [employer]. (4/1/2014 4:03:44 PM)

## 2017-08-15 NOTE — ED NOTES
Patient ambulatory to BR w/slow but steady gait, denies dizziness at this time.  Updated on POC.

## 2017-08-15 NOTE — PROGRESS NOTES
Pt aao x 4. Lauren. Pt has dizzy spell with movement. No dizzy at rest. Dizziness gets worse when pt moves head side to side. IVF running. Up sba with steady gait. Poc discussed with pt. Call light within reach. Instructed pt to use call light for assistance. Hourly rounding in use

## 2017-08-15 NOTE — DISCHARGE SUMMARY
CHIEF COMPLAINT ON ADMISSION  Chief Complaint   Patient presents with   • Dizziness   • Weakness   • N/V       CODE STATUS  Full Code    HPI & HOSPITAL COURSE  This is a 37 y.o. female here with severe vertigo. She reports that when she woke up on the morning of admission everything and herself was spinning. The symptoms failed to improve throughout the day. She has had intractable nausea and vomiting with these symptoms. She reports that her vomiting have been too numerous to count. She denies any blood in her vomitus. Denies any fevers or chills. Rest pain. No shortness of breath. No cough. Has associated severe dizziness and ongoing persistent vertigo. No focal neurological symptoms reported by the patient. Patient denies any dysarthria, facial droop or dysphagia. Denies any aphasia. She denies any motor weakness. She denies any new onset numbness or paresthesias.  Her CT head was negative and she has severe claustrophobia and did not want to undergo MRI. Her mother has MS history but this was her first episode that would be of concern. We placed her on a long taper of solumedrol and scheduled meclizine. She had improvement of her symptoms on 8/15. She was advised to follow up with her provider and if her symptoms continued she should get an MRI with sedation scheduled through her PCP.     Therefore, she is discharged in good and stable condition with close outpatient follow-up.    SPECIFIC OUTPATIENT FOLLOW-UP  PCP within 7-10days  MRI with sedation if symptoms continue    DISCHARGE PROBLEM LIST  Principal Problem:    Vertigo POA: Yes  Active Problems:    Thrombocytopenia (CMS-[facility]) POA: Yes    DM2 (diabetes mellitus, type 2) (CMS-[facility]) POA: Yes    Morbid obesity (CMS-[facility]) POA: Yes    Normocytic anemia POA: Yes  Resolved Problems:    * No resolved hospital problems. *      FOLLOW UP  Future Appointments  Date Time Provider Department Center   10/6/2017 11:15 AM Ciara Silva M.D. [facility] LYN[facility] Mast      Pcp Pt States None      get a follow up and if symptoms continue get referral to neurology and MRI Brain      MEDICATIONS ON DISCHARGE   Caridad Nino   Home Medication Instructions ROSA:[id number]    Printed on:08/15/17 1447   Medication Information                      meclizine (ANTIVERT) 25 MG Tab  Take 1 Tab by mouth 3 times a day for 5 days.             metformin (GLUCOPHAGE) 1000 MG tablet  Take 1 Tab by mouth 2 times a day, with meals.             methylPREDNISolone (MEDROL) 2 MG Tab  Take 0.5 Tabs by mouth every day for 7 days. After take 4mg x 7 days then 2mg for 7 days             methylPREDNISolone (MEDROL) 4 MG Tab  Take 4mg PO for 7 days then 2mg PO for 7 days             norgestimate-ethinyl estradiol (ORTHO-CYCLEN) 0.25-35 MG-MCG per tablet  Take 1 Tab by mouth every day.                 DIET  Orders Placed This Encounter   Procedures   • Diet Order     Standing Status: Standing      Number of Occurrences: 1      Standing Expiration Date:      Order Specific Question:  Diet:     Answer:  Regular [1]       ACTIVITY  As tolerated.  Weight bearing as tolerated      CONSULTATIONS  None        PROCEDURES  None      LABORATORY     Interval history/exam for day of discharge:    Filed Vitals:    08/15/17 1429 08/15/17 1431 08/15/17 1433 08/15/17 1542   BP: 133/62 132/55 136/60 143/68   Pulse: 75 71 72 73   Temp:    36.7 °C (98.1 °F)   TempSrc:       Resp:    18   Height:       Weight:       SpO2:         Weight/BMI: Body mass index is 42.95 kg/(m^2).         Most Recent Labs:    Lab Results   Component Value Date/Time    WBC 6.2 08/15/2017 05:50 AM    RBC 3.91* 08/15/2017 05:50 AM    HEMOGLOBIN 11.8* 08/15/2017 05:50 AM    HEMATOCRIT 37.3 08/15/2017 05:50 AM    MCV 95.4 08/15/2017 05:50 AM    MCH 30.2 08/15/2017 05:50 AM    MCHC 31.6* 08/15/2017 05:50 AM    MPV 11.8 08/15/2017 05:50 AM    NEUTROPHILS-POLYS 53.80 08/15/2017 05:50 AM    LYMPHOCYTES 36.20 08/15/2017 05:50 AM    MONOCYTES 7.60  08/15/2017 05:50 AM    EOSINOPHILS 1.50 08/15/2017 05:50 AM    BASOPHILS 0.60 08/15/2017 05:50 AM      Lab Results   Component Value Date/Time    SODIUM 136 08/15/2017 05:50 AM    POTASSIUM 4.1 08/15/2017 05:50 AM    CHLORIDE 106 08/15/2017 05:50 AM    CO2 23 08/15/2017 05:50 AM    GLUCOSE 154* 08/15/2017 05:50 AM    BUN 9 08/15/2017 05:50 AM    CREATININE 0.61 08/15/2017 05:50 AM      Lab Results   Component Value Date/Time    ALT(SGPT) 9 08/15/2017 05:50 AM    AST(SGOT) 13 08/15/2017 05:50 AM    ALKALINE PHOSPHATASE 46 08/15/2017 05:50 AM    TOTAL BILIRUBIN 0.3 08/15/2017 05:50 AM    ALBUMIN 3.2 08/15/2017 05:50 AM    GLOBULIN 2.8 08/15/2017 05:50 AM    INR 0.95 08/14/2017 01:17 PM     Lab Results   Component Value Date/Time    PT 13.0 08/14/2017 01:17 PM    INR 0.95 08/14/2017 01:17 PM        Imaging/ Testing:      CT-HEAD W/O   Final Result      1. No evidence of acute cerebral infarction, hemorrhage or mass lesion.                     Total time of the discharge process exceeds 36 minutes

## 2017-08-15 NOTE — H&P
Hospital Medicine History and Physical    Date of Service  8/14/2017    Chief Complaint  Chief Complaint   Patient presents with   • Dizziness   • Weakness   • N/V       History of Presenting Illness  37 y.o. female who presented 8/14/2017 with severe vertigo. Patient reports that she was in her baseline level of health last night. She reports that when she woke up this morning everything and herself was spinning. The symptoms failed to improve throughout the day. She has had intractable nausea and vomiting with these symptoms. She reports that her vomiting have been too numerous to count. She denies any blood in her vomitus. Denies any fevers or chills. Rest pain. No shortness of breath. No cough. Has associated severe dizziness and ongoing persistent vertigo. No focal neurological symptoms reported by the patient. Patient denies any dysarthria, facial droop or dysphagia. Denies any aphasia. She denies any motor weakness. She denies any new onset numbness or paresthesias.    Primary Care Physician  Pcp Pt States None    Consultants  None    Code Status  Full code    Review of Systems  Review of Systems   Constitutional: Positive for malaise/fatigue. Negative for fever, chills, weight loss and diaphoresis.   HENT: Negative for congestion, ear discharge, ear pain, hearing loss, nosebleeds, sore throat and tinnitus.    Eyes: Negative for blurred vision, double vision, photophobia, pain, discharge and redness.   Respiratory: Negative for cough, hemoptysis, sputum production, shortness of breath, wheezing and stridor.    Cardiovascular: Negative for chest pain, palpitations, orthopnea, claudication, leg swelling and PND.   Gastrointestinal: Negative for heartburn, nausea, vomiting, abdominal pain, diarrhea, constipation, blood in stool and melena.   Genitourinary: Negative for dysuria, urgency, frequency, hematuria and flank pain.   Musculoskeletal: Negative for myalgias, back pain, joint pain, falls and neck pain.    Skin: Negative for itching and rash.   Neurological: Positive for dizziness and weakness. Negative for tingling, tremors, sensory change, speech change, focal weakness, seizures, loss of consciousness and headaches.   Psychiatric/Behavioral: Negative for depression, suicidal ideas, hallucinations, memory loss and substance abuse. The patient is not nervous/anxious and does not have insomnia.           Past Medical History  Past Medical History   Diagnosis Date   • Hypertension      JUST DURING PREGNANCY   • Bronchitis    • Hemorrhoids    • Gestational diabetes mellitus 2016   • History of shingles 2016   • Pregnant    • Heart burn    • Dental disorder    • Pneumonia    • Snoring        Surgical History  Past Surgical History   Procedure Laterality Date   • Cholecystectomy         Medications  No current facility-administered medications on file prior to encounter.     Current Outpatient Prescriptions on File Prior to Encounter   Medication Sig Dispense Refill   • norgestimate-ethinyl estradiol (ORTHO-CYCLEN) 0.25-35 MG-MCG per tablet Take 1 Tab by mouth every day. 28 Tab 3       Family History  Mother with history of multiple sclerosis, diabetes mellitus and colon cancer. Sr. with history of throat cancer.    Social History  Social History   Substance Use Topics   • Smoking status: Former Smoker -- 1.00 packs/day for 27 years     Types: Cigarettes     Start date: 1989     Quit date: 2016   • Smokeless tobacco: Never Used   • Alcohol Use: No       Allergies  No Known Allergies     Physical Exam  Laboratory   Hemodynamics  Temp (24hrs), Av.4 °C (97.5 °F), Min:36.1 °C (97 °F), Max:36.7 °C (98 °F)   Temperature: 36.7 °C (98 °F)  Pulse  Av.3  Min: 75  Max: 81 Heart Rate (Monitored): 82  Blood Pressure: 157/70 mmHg, NIBP: 129/73 mmHg      Respiratory      Respiration: 18, Pulse Oximetry: 95 %, O2 Daily Delivery Respiratory : Room Air with O2 Available        RUL Breath  Sounds: Clear, RML Breath Sounds: Clear, RLL Breath Sounds: Clear, NAKUL Breath Sounds: Clear, LLL Breath Sounds: Clear    Physical Exam   Constitutional: She is oriented to person, place, and time. She appears well-developed and well-nourished. No distress.   HENT:   Head: Normocephalic.   Mouth/Throat: Oropharynx is clear and moist. No oropharyngeal exudate.   Eyes: Conjunctivae are normal. Pupils are equal, round, and reactive to light. No scleral icterus.   Neck: No JVD present.   Cardiovascular: Normal rate, regular rhythm and normal heart sounds.    No murmur heard.  Pulmonary/Chest: Effort normal and breath sounds normal. No stridor. No respiratory distress. She has no wheezes. She has no rales.   Abdominal: Soft. Bowel sounds are normal. She exhibits no distension. There is no tenderness. There is no rebound.   Musculoskeletal: She exhibits no edema or tenderness.   Neurological: She is alert and oriented to person, place, and time. No cranial nerve deficit.    is noted to have a nystagmus in horizontal gaze. Otherwise power is 5 x 5 in bilateral upper and lower extremities. There is no pronator drift. No signs of meningismus. Bilateral upper and lower extremity sensation is intact. No dysarthria. No aphasia. No cerebellar signs. Tone is normal. Deep tendon flexors are +2 in bilateral upper and lower extremity.   Skin: Skin is warm and dry. She is not diaphoretic. No erythema.   Psychiatric: She has a normal mood and affect. Her behavior is normal. Judgment and thought content normal.       Recent Labs      08/14/17   1317   WBC  6.6   RBC  4.34   HEMOGLOBIN  12.9   HEMATOCRIT  40.6   MCV  93.5   MCH  29.7   MCHC  31.8*   RDW  46.7   PLATELETCT  137*   MPV  12.2     Recent Labs      08/14/17   1317   SODIUM  136   POTASSIUM  4.4   CHLORIDE  104   CO2  23   GLUCOSE  137*   BUN  12   CREATININE  0.59   CALCIUM  8.6     Recent Labs      08/14/17   1317   ALTSGPT  10   ASTSGOT  13   ALKPHOSPHAT  44    TBILIRUBIN  0.4   GLUCOSE  137*     Recent Labs      08/14/17   1317   APTT  23.9*   INR  0.95             Lab Results   Component Value Date    TROPONINI <0.01 08/14/2017       Imaging  Review    Assessment/Plan     I anticipate this patient is appropriate for observation status at this time.    Vertigo (present on admission)  Assessment & Plan  Profound.  I suspect viral labyrinthitis.  Schedule meclizine for now. IV fluid hydration. Conservative management.  Noted to have profound nystagmus in horizontal gaze.  Recommend MRI of the brain to rule out any posterior circulation stroke or space-occupying lesion.  Physical therapy evaluation.    Thrombocytopenia (CMS-HCC) (present on admission)  Assessment & Plan  Appears chronic. Monitor.    Hyperglycemia (present on admission)  Assessment & Plan  Obtain hemoglobin A1c    Morbid obesity (CMS-HCC) (present on admission)  Assessment & Plan  Body mass index is 42.95 kg/(m^2).          VTE prophylaxis: SCDs and subcutaneous Lovenox .

## 2017-08-15 NOTE — PROGRESS NOTES
Pt arrived to unit via gurpiedad at 1815. Pt oriented to room, unit, and plan of care. Admit profile and assessment complete. Pt c/o dizziness, denies nausea;  Blood collected and sent to lab. Awaiting meds from pharmacy; MRI screening sheet collected and faxed, Pt verbalized serious fear of MRI, PRN medication available for MRI. Encouraged pt to try and complete diagnotic test to assist in diagnosis, Pt verbalized understanding;  All questions answered at this time. Call light within reach; fall precautions in place.

## 2017-08-15 NOTE — PROGRESS NOTES
Bedside report received 0710. POC discussed with pt; Pt verbalizes improvement with dizziness; No overnight events; IV infiltrated, Removed, Will establish new PIV; all questions answered at this time.

## 2017-08-16 NOTE — PROGRESS NOTES
Pt dc'd home. IV and monitor removed; monitor room notified. Pt left unit via wheelchair with Tech. Personal belongings with pt when leaving unit. Pt given discharge instructions prior to leaving unit including prescription and when to visit with physician; verbalizes understanding. Copy of discharge instructions with pt and in the chart.

## 2017-08-18 LAB — EKG IMPRESSION: NORMAL

## 2017-11-17 ENCOUNTER — TELEPHONE (OUTPATIENT)
Dept: OBGYN | Facility: CLINIC | Age: 37
End: 2017-11-17

## 2017-11-18 NOTE — TELEPHONE ENCOUNTER
----- Message from Penelope Bryant sent at 11/17/2017  2:33 PM PST -----  Regarding: refill  Contact: [phone]  Patient called wanted to know if she could get a refill on her birthcontrol sent over to the [facility] pharmacy on [address]. Please call her back once its done.thank you!she also states she has other questions.

## 2018-02-06 ENCOUNTER — TELEPHONE (OUTPATIENT)
Dept: OBGYN | Facility: MEDICAL CENTER | Age: 38
End: 2018-02-06

## 2018-02-06 NOTE — TELEPHONE ENCOUNTER
Patient called asking for a refill on her ortho-tricycline.Per Dr.Sta. Silva pt can have a 3 month supply but must make an appointment in order to get more refills. Pt verbalized understanding  And has no further questions at this time.

## 2018-04-16 ENCOUNTER — TELEPHONE (OUTPATIENT)
Dept: OBGYN | Facility: CLINIC | Age: 38
End: 2018-04-16

## 2018-04-16 NOTE — TELEPHONE ENCOUNTER
----- Message from Devonte Tovar sent at 4/16/2018  9:47 AM PDT -----  Regarding: B/C request  Mono-linyah will run out before her appt on 5/30. [facility] on [place].

## 2018-05-30 ENCOUNTER — HOSPITAL ENCOUNTER (OUTPATIENT)
Facility: MEDICAL CENTER | Age: 38
End: 2018-05-30
Attending: OBSTETRICS & GYNECOLOGY
Payer: OTHER MISCELLANEOUS

## 2018-05-30 ENCOUNTER — GYNECOLOGY VISIT (OUTPATIENT)
Dept: OBGYN | Facility: MEDICAL CENTER | Age: 38
End: 2018-05-30
Payer: OTHER MISCELLANEOUS

## 2018-05-30 VITALS
WEIGHT: 274 LBS | BODY MASS INDEX: 41.52 KG/M2 | DIASTOLIC BLOOD PRESSURE: 92 MMHG | SYSTOLIC BLOOD PRESSURE: 126 MMHG | HEIGHT: 68 IN

## 2018-05-30 DIAGNOSIS — Z30.09 FAMILY PLANNING: ICD-10-CM

## 2018-05-30 DIAGNOSIS — Z11.51 SCREENING FOR HUMAN PAPILLOMAVIRUS (HPV): ICD-10-CM

## 2018-05-30 DIAGNOSIS — Z01.419 WELL WOMAN EXAM: ICD-10-CM

## 2018-05-30 DIAGNOSIS — N63.20 LEFT BREAST MASS: ICD-10-CM

## 2018-05-30 DIAGNOSIS — Z12.4 PAP SMEAR FOR CERVICAL CANCER SCREENING: ICD-10-CM

## 2018-05-30 PROCEDURE — 99385 PREV VISIT NEW AGE 18-39: CPT | Performed by: OBSTETRICS & GYNECOLOGY

## 2018-05-30 PROCEDURE — 87624 HPV HI-RISK TYP POOLED RSLT: CPT

## 2018-05-30 PROCEDURE — 88175 CYTOPATH C/V AUTO FLUID REDO: CPT

## 2018-05-30 RX ORDER — NORGESTIMATE AND ETHINYL ESTRADIOL 0.25-0.035
1 KIT ORAL DAILY
Qty: 84 TAB | Refills: 4 | Status: SHIPPED | OUTPATIENT
Start: 2018-05-30 | End: 2019-08-27

## 2018-05-30 NOTE — PROGRESS NOTES
ANNUAL Gynecologic Exam        HPI Comments:   37 year old  presents for well woman exam.   Patient's last menstrual period was 02/25/2016.  She had a vaginal delivery 18 months ago.  Currently on OCP - heavy periods with blood clots during the placebo week  (+) pelvic cramping  States more vaginal discharge after delivery  No dyspareunia  Never smoker  No family history of breast/ovarian cancer    Review of Systems   Pertinent positives documented in HPI and all other systems reviewed & are negative    All PMH, PSH, allergies, social history and FH reviewed and updated today:  Past Medical History:   Diagnosis Date   • Bronchitis 1995   • Dental disorder    • Gestational diabetes mellitus 8/30/2016   • Heart burn    • Hemorrhoids 1998   • History of shingles June/July 2016   • Hypertension 1997    JUST DURING PREGNANCY   • Normocytic anemia 8/15/2017   • Pneumonia    • Pregnant    • Snoring      Past Surgical History:   Procedure Laterality Date   • CHOLECYSTECTOMY  1998     Patient has no known allergies.  Social History     Social History   • Marital status:      Spouse name: N/A   • Number of children: N/A   • Years of education: N/A     Social History Main Topics   • Smoking status: Former Smoker     Packs/day: 1.00     Years: 27.00     Types: Cigarettes     Start date: 1/1/1989     Quit date: 4/5/2016   • Smokeless tobacco: Never Used   • Alcohol use No   • Drug use: No   • Sexual activity: Not on file     Other Topics Concern   • Not on file     Social History Narrative   • No narrative on file     No family history on file.  Medications:   Current Outpatient Prescriptions Ordered in [facility]   Medication Sig Dispense Refill   • norgestimate-ethinyl estradiol (ORTHO-CYCLEN) 0.25-35 MG-MCG per tablet Take 1 Tab by mouth every day. 84 Tab 4   • metformin (GLUCOPHAGE) 1000 MG tablet Take 1 Tab by mouth 2 times a day, with meals. 60 Tab 0   • methylPREDNISolone (MEDROL) 4 MG Tab Take 4mg PO for 7 days then 2mg  "PO for 7 days 11 Tab 0     No current Epic-ordered facility-administered medications on file.       Objective:   Vital measurements:  Blood pressure 126/92, height 1.727 m (5' 8\"), weight 124.3 kg (274 lb), last menstrual period 02/25/2016, not currently breastfeeding.  Body mass index is 41.66 kg/m². (Goal BM I>18 <25)    Physical Exam   Nursing note and vitals reviewed.  Constitutional: She is oriented to person, place, and time. She appears well-developed and well-nourished. No distress.     HEENT:   Head: Normocephalic and atraumatic.   Right Ear: External ear normal.   Left Ear: External ear normal.   Nose: Nose normal.   Eyes: Conjunctivae and EOM are normal. Pupils are equal, round, and reactive to light. No scleral icterus.     Neck: Normal range of motion. Neck supple. No tracheal deviation present. No thyromegaly present.     Pulmonary/Chest: Effort normal and breath sounds normal. No respiratory distress. She has no wheezes. She has no rales. She exhibits no tenderness.     Cardiovascular: Regular, rate and rhythm. No JVD.    Abdominal: Soft. Bowel sounds are normal. She exhibits no distension and no mass. No tenderness. She has no rebound and no guarding.     Breast:  No dimpling or skin changes, Normal nipples without discharge  Vague nodule that is movable and non tender at the outer lower quadrant of left breast    Genitourinary:  Pelvic exam was performed with patient supine.  External genitalia with no abnormal pigmentation, labial fusion,rash, tenderness, lesion or injury to the labia bilaterally.  Vagina is moist with no lesions, foul discharge, erythema, tenderness or bleeding. No foreign body around the vagina or signs of injury.   Cervix exhibits no motion tenderness, no discharge and no friability.   Uterus and adnexa difficult to palpate because of body habitus  No masses no tenderness  Musculoskeletal: Normal range of motion. She exhibits no edema and no tenderness.     Lymphadenopathy: She " has no cervical adenopathy.     Neurological: She is alert and oriented to person, place, and time. She exhibits normal muscle tone.     Skin: Skin is warm and dry. No rash noted. She is not diaphoretic. No erythema. No pallor.     Psychiatric: She has a normal mood and affect. Her behavior is normal. Judgment and thought content normal  Assessment:     1. Screening for human papillomavirus (HPV)  ThinPrep Pap, w/HPV rflx to genotype   2. Pap smear for cervical cancer screening  ThinPrep Pap, w/HPV rflx to genotype   3. Well woman exam  ThinPrep Pap, w/HPV rflx to genotype   4. Family planning  norgestimate-ethinyl estradiol (ORTHO-CYCLEN) 0.25-35 MG-MCG per tablet   5. Left breast mass  US-BREAST COMPLETE-LEFT     Plan:   Pap and physical exam performed  Monthly SBE.  Counseling: breast self exam, STD prevention, HIV risk factors and prevention, use and side effects of OCPs and family planning choices. LARC - IUD vs nexplanon discussed. Brochures given  Encourage exercise and proper diet. Weight loss  Mammograms starting @ age 40 annually.  See medications and orders placed in encounter report.

## 2018-06-01 LAB
CYTOLOGY REG CYTOL: NORMAL
HPV HR 12 DNA CVX QL NAA+PROBE: NEGATIVE
HPV16 DNA SPEC QL NAA+PROBE: NEGATIVE
HPV18 DNA SPEC QL NAA+PROBE: NEGATIVE
SPECIMEN SOURCE: NORMAL

## 2018-06-15 ENCOUNTER — HOSPITAL ENCOUNTER (OUTPATIENT)
Dept: RADIOLOGY | Facility: MEDICAL CENTER | Age: 38
End: 2018-06-15
Attending: OBSTETRICS & GYNECOLOGY
Payer: OTHER MISCELLANEOUS

## 2018-06-15 DIAGNOSIS — N63.20 LEFT BREAST MASS: ICD-10-CM

## 2018-06-15 PROCEDURE — 77066 DX MAMMO INCL CAD BI: CPT

## 2018-06-15 PROCEDURE — 76642 ULTRASOUND BREAST LIMITED: CPT | Mod: LT

## 2018-06-27 NOTE — TELEPHONE ENCOUNTER
Called medication into the pharmacy and called and informed the patient via voicemail. Pt scheduled for annual on 5/30/18   Pt states was bending over to take out trash when had syncopal episode. Wife states was unresponsive for approx 2 minutes. Pt denies SOB, CP or dizziness. Had one episode of emesis and still feels nauseated.   Wife states was diaphoretic and pale during

## 2018-08-25 ENCOUNTER — OFFICE VISIT (OUTPATIENT)
Dept: URGENT CARE | Facility: CLINIC | Age: 38
End: 2018-08-25
Payer: OTHER MISCELLANEOUS

## 2018-08-25 VITALS
TEMPERATURE: 97.3 F | HEART RATE: 84 BPM | WEIGHT: 279.2 LBS | BODY MASS INDEX: 42.31 KG/M2 | DIASTOLIC BLOOD PRESSURE: 90 MMHG | SYSTOLIC BLOOD PRESSURE: 144 MMHG | RESPIRATION RATE: 18 BRPM | OXYGEN SATURATION: 96 % | HEIGHT: 68 IN

## 2018-08-25 DIAGNOSIS — J01.90 ACUTE BACTERIAL SINUSITIS: ICD-10-CM

## 2018-08-25 DIAGNOSIS — B96.89 ACUTE BACTERIAL SINUSITIS: ICD-10-CM

## 2018-08-25 PROCEDURE — 99214 OFFICE O/P EST MOD 30 MIN: CPT | Performed by: NURSE PRACTITIONER

## 2018-08-25 RX ORDER — AMOXICILLIN AND CLAVULANATE POTASSIUM 875; 125 MG/1; MG/1
1 TABLET, FILM COATED ORAL 2 TIMES DAILY
Qty: 14 TAB | Refills: 0 | Status: SHIPPED | OUTPATIENT
Start: 2018-08-25 | End: 2018-09-01

## 2018-08-25 ASSESSMENT — ENCOUNTER SYMPTOMS
SPUTUM PRODUCTION: 1
SINUS PAIN: 1
COUGH: 1
CHILLS: 1

## 2018-08-25 NOTE — PROGRESS NOTES
"Subjective:      Caridad Nino is a 38 y.o. female who presents with Nasal Congestion (sore throat, voice hoarseness )            This is a new problem. Pt reports she developed sinus congestion, pain and thick green sinus discharge x 2 days. Reports chills for 2 days as well. Admits her cough is pretty harsh and productive of clear sputum. She has been taking DayQuil and NyQuil with some relief. Coughing at night is the most bothersome. She is currently 10 days post partum and not breastfeeding.        Review of Systems   Constitutional: Positive for chills.   HENT: Positive for congestion and sinus pain.    Respiratory: Positive for cough and sputum production.    All other systems reviewed and are negative.    Past Medical History:   Diagnosis Date   • Bronchitis 1995   • Dental disorder    • Gestational diabetes mellitus 8/30/2016   • Heart burn    • Hemorrhoids 1998   • History of shingles June/July 2016   • Hypertension 1997    JUST DURING PREGNANCY   • Normocytic anemia 8/15/2017   • Pneumonia    • Pregnant    • Snoring       Past Surgical History:   Procedure Laterality Date   • CHOLECYSTECTOMY  1998      Social History     Social History   • Marital status:      Spouse name: N/A   • Number of children: N/A   • Years of education: N/A     Occupational History   • Not on file.     Social History Main Topics   • Smoking status: Former Smoker     Packs/day: 1.00     Years: 27.00     Types: Cigarettes     Start date: 1/1/1989     Quit date: 4/5/2016   • Smokeless tobacco: Never Used   • Alcohol use No   • Drug use: No   • Sexual activity: Not on file     Other Topics Concern   • Not on file     Social History Narrative   • No narrative on file          Objective:     /90   Pulse 84   Temp 36.3 °C (97.3 °F)   Resp 18   Ht 1.727 m (5' 8\")   Wt (!) 126.6 kg (279 lb 3.2 oz)   SpO2 96%   Breastfeeding? No   BMI 42.45 kg/m²      Physical Exam   Constitutional: She is oriented to person, " place, and time. Vital signs are normal. She appears well-developed and well-nourished.   HENT:   Head: Normocephalic and atraumatic.   Right Ear: Tympanic membrane and external ear normal.   Left Ear: Tympanic membrane and external ear normal.   Nose: Mucosal edema, rhinorrhea and sinus tenderness present.   Mouth/Throat: Posterior oropharyngeal erythema present.   Bilateral ear congestion and sensitivity   Eyes: Pupils are equal, round, and reactive to light. EOM are normal.   Neck: Normal range of motion.   Cardiovascular: Normal rate and regular rhythm.    Pulmonary/Chest: Effort normal and breath sounds normal.   Musculoskeletal: Normal range of motion.   Lymphadenopathy:        Head (right side): Submandibular adenopathy present.        Head (left side): Submandibular adenopathy present.   Neurological: She is alert and oriented to person, place, and time.   Skin: Skin is warm and dry. Capillary refill takes less than 2 seconds.   Psychiatric: She has a normal mood and affect. Her speech is normal and behavior is normal. Thought content normal.   Vitals reviewed.              Assessment/Plan:     1. Acute bacterial sinusitis  - amoxicillin-clavulanate (AUGMENTIN) 875-125 MG Tab; Take 1 Tab by mouth 2 times a day for 7 days.  Dispense: 14 Tab; Refill: 0    Increase water intake  Continue Tylenol and ibuprofen as needed for pain/chills  Sleep with HOB elevated to help decrease cough at night  Warm salt water gargles  Throat lozenges as directed  Get plenty of rest  Supportive care, differential diagnoses, and indications for immediate follow-up discussed with patient.    Pathogenesis of diagnosis discussed including typical length and natural progression.      Instructed to return to  or nearest emergency department if symptoms fail to improve, for any change in condition, further concerns, or new concerning symptoms.  Patient states understanding of the plan of care and discharge instructions.

## 2018-08-26 ENCOUNTER — TELEPHONE (OUTPATIENT)
Dept: URGENT CARE | Facility: CLINIC | Age: 38
End: 2018-08-26

## 2018-08-28 ENCOUNTER — TELEPHONE (OUTPATIENT)
Dept: OBGYN | Facility: CLINIC | Age: 38
End: 2018-08-28

## 2018-08-28 NOTE — TELEPHONE ENCOUNTER
Radha Nelson, Med Ass't   Phone Number: [phone]             Pt called is requesting birth control re-fill      Pt still have refills left.    Unable to contact pt msg left to call back

## 2018-11-27 ENCOUNTER — HOSPITAL ENCOUNTER (EMERGENCY)
Facility: MEDICAL CENTER | Age: 38
End: 2018-11-27
Attending: EMERGENCY MEDICINE

## 2018-11-27 VITALS
BODY MASS INDEX: 41.53 KG/M2 | WEIGHT: 274.03 LBS | SYSTOLIC BLOOD PRESSURE: 158 MMHG | HEART RATE: 119 BPM | OXYGEN SATURATION: 94 % | RESPIRATION RATE: 18 BRPM | TEMPERATURE: 97.9 F | DIASTOLIC BLOOD PRESSURE: 87 MMHG | HEIGHT: 68 IN

## 2018-11-27 DIAGNOSIS — H65.92 LEFT NON-SUPPURATIVE OTITIS MEDIA: ICD-10-CM

## 2018-11-27 DIAGNOSIS — J40 BRONCHITIS: ICD-10-CM

## 2018-11-27 PROCEDURE — 99283 EMERGENCY DEPT VISIT LOW MDM: CPT

## 2018-11-27 RX ORDER — ALBUTEROL SULFATE 90 UG/1
2 AEROSOL, METERED RESPIRATORY (INHALATION) EVERY 6 HOURS PRN
Qty: 8.5 G | Refills: 0 | Status: SHIPPED | OUTPATIENT
Start: 2018-11-27 | End: 2019-08-27

## 2018-11-27 RX ORDER — AZITHROMYCIN 250 MG/1
TABLET, FILM COATED ORAL
Qty: 6 TAB | Refills: 0 | Status: SHIPPED | OUTPATIENT
Start: 2018-11-27 | End: 2019-08-27

## 2018-11-27 ASSESSMENT — LIFESTYLE VARIABLES: DO YOU DRINK ALCOHOL: NO

## 2018-11-27 ASSESSMENT — PAIN SCALES - GENERAL: PAINLEVEL_OUTOF10: 0

## 2018-11-27 NOTE — ED PROVIDER NOTES
"CHIEF COMPLAINT  Chief Complaint   Patient presents with   • Flu Like Symptoms     Runny nose*3 wks \"it was green this morning.\". Scratchy throat. Occasional Cough       HPI  Caridad Nino is a 38 y.o. female who presents with cough over the last couple of weeks as well as runny nose over the last 3 weeks.  She noted that she was coughing up green material this morning.  She also notes watery eyes.  No definite fever.  Nothing makes her symptoms better.  No vomiting or diarrhea.    REVIEW OF SYSTEMS  All other systems are negative.     PAST MEDICAL HISTORY  Past Medical History:   Diagnosis Date   • Bronchitis 1995   • Dental disorder    • Gestational diabetes mellitus 8/30/2016   • Heart burn    • Hemorrhoids 1998   • History of shingles June/July 2016   • Hypertension 1997    JUST DURING PREGNANCY   • Normocytic anemia 8/15/2017   • Pneumonia    • Pregnant    • Snoring        FAMILY HISTORY  History reviewed. No pertinent family history.    SOCIAL HISTORY  Social History     Social History   • Marital status:      Spouse name: N/A   • Number of children: N/A   • Years of education: N/A     Social History Main Topics   • Smoking status: Former Smoker     Packs/day: 1.00     Years: 27.00     Types: Cigarettes     Start date: 1/1/1989     Quit date: 4/5/2016   • Smokeless tobacco: Never Used   • Alcohol use No   • Drug use: No   • Sexual activity: Not on file     Other Topics Concern   • Not on file     Social History Narrative   • No narrative on file       SURGICAL HISTORY  Past Surgical History:   Procedure Laterality Date   • CHOLECYSTECTOMY  1998       CURRENT MEDICATIONS  Home Medications     Reviewed by Noy Best R.N. (Registered Nurse) on 11/27/18 at 1523  Med List Status: Complete   Medication Last Dose Status   norgestimate-ethinyl estradiol (ORTHO-CYCLEN) 0.25-35 MG-MCG per tablet 11/27/2018 Active                ALLERGIES  Allergies   Allergen Reactions   • Amoxicillin  " "      PHYSICAL EXAM  VITAL SIGNS: /87   Pulse (!) 119   Temp 36.6 °C (97.9 °F) (Temporal)   Resp 18   Ht 1.727 m (5' 8\")   Wt 124.3 kg (274 lb 0.5 oz)   LMP 11/13/2018 (Exact Date)   SpO2 94%   BMI 41.67 kg/m²      Constitutional: Well developed, Well nourished, No acute distress, Non-toxic appearance.   HENT: Normocephalic, Atraumatic, right TM is normal, left TM demonstrates erythema and opacification , mucous membranes moist, no erythema, exudates, swelling, or masses, nares boggy  Eyes: nonicteric  Neck: Supple, no meningismus  Lymphatic: No lymphadenopathy noted.   Cardiovascular: Tachycardic with regular rhythm, no gallops rubs or murmurs  Lungs: Trace wheezing on the upper left posteriorly  Abdomen: Nontender  Skin: Warm, Dry, no rash  Back: No tenderness, No CVA tenderness.   Genitalia: Deferred  Rectal: Deferred  Extremities: No edema  Neurologic: Alert, appropriate, follows commands, moving all extremities, normal speech   Psychiatric: Affect normal    COURSE & MEDICAL DECISION MAKING  Pertinent Labs & Imaging studies reviewed. (See chart for details)    Repeat  (3:50)  This is a 38-year-old female who likely has a bronchitis but additionally has evidence of otitis media.  She does have some focal wheezing in the left upper posterior lobe.  I will cover her with an antibiotic for her ear and also to cover for pulmonary source.  I will also write for an inhaler.  In terms of her boggy nares, she was recommended Zyrtec over-the-counter.  She will otherwise follow-up with her regular provider and return for progressive cough high fever or other concerns    FINAL IMPRESSION  1.  Otitis media, left  2.  Bronchitis  3.         Electronically signed by: Travis Suarez, 11/27/2018 3:51 PM      "

## 2018-11-27 NOTE — ED TRIAGE NOTES
"  Chief Complaint   Patient presents with   • Flu Like Symptoms     Runny nose*3 wks \"it was green this morning.\". Scratchy throat. Occasional Cough     Ambulatory to triage for above.  NAD.  Able to eat drink fine.     /87   Pulse (!) 119   Temp 36.6 °C (97.9 °F) (Temporal)   Resp 18   Ht 1.727 m (5' 8\")   Wt 124.3 kg (274 lb 0.5 oz)   LMP 11/13/2018 (Exact Date)   SpO2 94%   BMI 41.67 kg/m²     "

## 2018-11-28 ENCOUNTER — PATIENT OUTREACH (OUTPATIENT)
Dept: HEALTH INFORMATION MANAGEMENT | Facility: OTHER | Age: 38
End: 2018-11-28

## 2019-06-07 ENCOUNTER — OFFICE VISIT (OUTPATIENT)
Dept: URGENT CARE | Facility: PHYSICIAN GROUP | Age: 39
End: 2019-06-07
Payer: COMMERCIAL

## 2019-06-07 VITALS
WEIGHT: 260 LBS | SYSTOLIC BLOOD PRESSURE: 140 MMHG | BODY MASS INDEX: 39.4 KG/M2 | RESPIRATION RATE: 14 BRPM | TEMPERATURE: 97.3 F | HEIGHT: 68 IN | DIASTOLIC BLOOD PRESSURE: 80 MMHG | HEART RATE: 83 BPM | OXYGEN SATURATION: 95 %

## 2019-06-07 DIAGNOSIS — K52.9 AGE (ACUTE GASTROENTERITIS): ICD-10-CM

## 2019-06-07 DIAGNOSIS — R09.82 POSTNASAL DRIP: ICD-10-CM

## 2019-06-07 DIAGNOSIS — E11.8 TYPE 2 DIABETES MELLITUS WITH COMPLICATION, WITHOUT LONG-TERM CURRENT USE OF INSULIN (HCC): ICD-10-CM

## 2019-06-07 LAB
GLUCOSE BLD-MCNC: 161 MG/DL (ref 70–100)
HBA1C MFR BLD: 8.8 % (ref 0–5.6)
INT CON NEG: NEGATIVE
INT CON NEG: NORMAL
INT CON POS: NORMAL
INT CON POS: POSITIVE
S PYO AG THROAT QL: NEGATIVE

## 2019-06-07 PROCEDURE — 83036 HEMOGLOBIN GLYCOSYLATED A1C: CPT | Performed by: FAMILY MEDICINE

## 2019-06-07 PROCEDURE — 99214 OFFICE O/P EST MOD 30 MIN: CPT | Performed by: FAMILY MEDICINE

## 2019-06-07 PROCEDURE — 82962 GLUCOSE BLOOD TEST: CPT | Performed by: FAMILY MEDICINE

## 2019-06-07 PROCEDURE — 87880 STREP A ASSAY W/OPTIC: CPT | Performed by: FAMILY MEDICINE

## 2019-06-07 RX ORDER — FLUTICASONE PROPIONATE 50 MCG
1 SPRAY, SUSPENSION (ML) NASAL 2 TIMES DAILY
Qty: 1 BOTTLE | Refills: 0 | Status: SHIPPED | OUTPATIENT
Start: 2019-06-07 | End: 2021-05-20

## 2019-06-07 NOTE — PROGRESS NOTES
Chief Complaint   Patient presents with   • Diarrhea     vomiting congestion green mucus            Diarrhea   This is a new problem. The current episode started in the past 3 days.       The problem occurs 3-4 times per day. The problem has been unchanged. The stool consistency is described as watery. The patient states that diarrhea does not awaken from sleep.  She has also had several episodes of nausea. Pertinent negatives include no abdominal pain, chills, coughing, fever, headaches, vomiting or weight loss. Nothing aggravates the symptoms. There are no known risk factors. Patient has tried nothing for the symptoms. There is no history of inflammatory bowel disease.         #2. C/o sore throat x 1 d          #3.  Hx of GDM and A1c was still testing in diabetic range after delivery.         Social History     Social History   • Marital status:      Spouse name: N/A   • Number of children: N/A   • Years of education: N/A     Occupational History   • Not on file.     Social History Main Topics   • Smoking status: Former Smoker     Packs/day: 1.00     Years: 27.00     Types: Cigarettes     Start date: 1/1/1989     Quit date: 4/5/2016   • Smokeless tobacco: Never Used   • Alcohol use No   • Drug use: No   • Sexual activity: Not on file     Other Topics Concern   • Not on file     Social History Narrative   • No narrative on file           Past Medical History:   Diagnosis Date   • Bronchitis 1995   • Dental disorder    • Gestational diabetes mellitus 8/30/2016   • Heart burn    • Hemorrhoids 1998   • History of shingles June/July 2016   • Hypertension 1997    JUST DURING PREGNANCY   • Normocytic anemia 8/15/2017   • Pneumonia    • Pregnant    • Snoring            Review of Systems   Constitutional: Negative for fever, chills and malaise/fatigue.   Eyes: Negative for vision changes, d/c.    Respiratory: Negative for cough and sputum production.    Cardiovascular: Negative for chest pain and palpitations.  "  Gastrointestinal: +   vomiting, , diarrhea .   Denies abd pain.  denies  Blood in stool..  Denies constipation.   Genitourinary: Negative for dysuria, urgency and frequency.   Skin: Negative for rash or  itching.   Neurological: Negative for dizziness and tingling.   Psychiatric/Behavioral: Negative for depression.   Hematologic/lymphatic - denies bruising or excessive bleeding  All other systems reviewed and are negative.       Objective:     /80   Pulse 83   Temp 36.3 °C (97.3 °F) (Temporal)   Resp 14   Ht 1.727 m (5' 8\")   Wt 117.9 kg (260 lb)   SpO2 95%     Physical Exam   Constitutional: pt is oriented to person, place, and time and appears well-developed. No distress.   HENT:   Head: Normocephalic and atraumatic.   Nose - boggy mucosa  Mouth/Throat: No oropharyngeal exudate.  + clear postnasal drip noted.    Eyes: Conjunctivae are normal. No scleral icterus.   Cardiovascular: Normal rate, regular rhythm and normal heart sounds.    Pulmonary/Chest: Effort normal and breath sounds normal. No respiratory distress. Pt has no wheezes. Pt has no rales.   Abdominal: Normal appearance and bowel sounds are normal. There is no splenomegaly or hepatomegaly. There is no tenderness. There is no rebound, no guarding, no CVA tenderness and no tenderness at McBurney's point.   Lymphadenopathy:     Pt has no cervical adenopathy.   Neurological: pt is alert and oriented to person, place, and time.   Skin: Skin is warm. Pt is not diaphoretic. No erythema.   Psychiatric:  behavior is normal.   Nursing note and vitals reviewed.              Assessment/Plan:        1. AGE (acute gastroenteritis)  Push fluids  otc imodium, prn    2. Postnasal drip   rapid strep negative.   - fluticasone (FLONASE) 50 MCG/ACT nasal spray; Spray 1 Spray in nose 2 times a day.  Dispense: 1 Bottle; Refill: 0    3. Type 2 diabetes mellitus with complication, without long-term current use of insulin ([facility])   A1c 8.4  She will likely need " metformin    - REFERRAL TO ENDOCRINOLOGY  - POCT Glucose

## 2019-06-07 NOTE — LETTER
June 7, 2019         Patient: Caridad Nino   YOB: 1980   Date of Visit: 6/7/2019           To Whom it May Concern:    Caridad Nino was seen in my clinic on 6/7/2019.    Please excuse her absence 6/6-6/7.    If you have any questions or concerns, please don't hesitate to call.        Sincerely,           Jamshid Polanco M.D.  Electronically Signed

## 2019-07-08 NOTE — TELEPHONE ENCOUNTER
Arnuity Ellipta 200 MCG denied. They would like patient to try two of the preferred inhalers like Advair Diskus and Flovent.    Katlin Alexis, CMA on 7/8/2019 at 9:21 AM         Pt broke out in hives after taking antibiotic, can you send something different to her pharmacy.

## 2019-07-30 ENCOUNTER — TELEPHONE (OUTPATIENT)
Dept: OBGYN | Facility: MEDICAL CENTER | Age: 39
End: 2019-07-30

## 2019-07-30 NOTE — TELEPHONE ENCOUNTER
Pt called requesting a refill for her BC pills,  Was informed that she needs to have an annual appt to get a new Rx.  Scheduled for 8/20/19 @ [id number] colleen/Neema.  I will call [facility] to approve 1 refill, pt understands that she needs to be seen before any more refills.   Verbalized understanding.

## 2019-08-08 ENCOUNTER — HOSPITAL ENCOUNTER (OUTPATIENT)
Facility: MEDICAL CENTER | Age: 39
End: 2019-08-08
Attending: PHYSICIAN ASSISTANT
Payer: COMMERCIAL

## 2019-08-08 ENCOUNTER — OFFICE VISIT (OUTPATIENT)
Dept: URGENT CARE | Facility: CLINIC | Age: 39
End: 2019-08-08
Payer: COMMERCIAL

## 2019-08-08 VITALS
HEART RATE: 94 BPM | SYSTOLIC BLOOD PRESSURE: 120 MMHG | HEIGHT: 68 IN | OXYGEN SATURATION: 96 % | TEMPERATURE: 98.1 F | BODY MASS INDEX: 40.62 KG/M2 | RESPIRATION RATE: 20 BRPM | WEIGHT: 268 LBS | DIASTOLIC BLOOD PRESSURE: 82 MMHG

## 2019-08-08 DIAGNOSIS — E66.01 MORBID OBESITY WITH BMI OF 40.0-44.9, ADULT (HCC): ICD-10-CM

## 2019-08-08 DIAGNOSIS — M54.12 CERVICAL RADICULOPATHY: ICD-10-CM

## 2019-08-08 DIAGNOSIS — M54.2 NECK PAIN: ICD-10-CM

## 2019-08-08 DIAGNOSIS — E11.65 UNCONTROLLED TYPE 2 DIABETES MELLITUS WITH HYPERGLYCEMIA (HCC): ICD-10-CM

## 2019-08-08 LAB
ALBUMIN SERPL BCP-MCNC: 4.2 G/DL (ref 3.2–4.9)
ALBUMIN/GLOB SERPL: 1.2 G/DL
ALP SERPL-CCNC: 63 U/L (ref 30–99)
ALT SERPL-CCNC: 29 U/L (ref 2–50)
ANION GAP SERPL CALC-SCNC: 7 MMOL/L (ref 0–11.9)
AST SERPL-CCNC: 32 U/L (ref 12–45)
BASOPHILS # BLD AUTO: 0.5 % (ref 0–1.8)
BASOPHILS # BLD: 0.04 K/UL (ref 0–0.12)
BILIRUB SERPL-MCNC: 0.3 MG/DL (ref 0.1–1.5)
BUN SERPL-MCNC: 15 MG/DL (ref 8–22)
CALCIUM SERPL-MCNC: 9.4 MG/DL (ref 8.5–10.5)
CHLORIDE SERPL-SCNC: 100 MMOL/L (ref 96–112)
CO2 SERPL-SCNC: 29 MMOL/L (ref 20–33)
CREAT SERPL-MCNC: 0.6 MG/DL (ref 0.5–1.4)
EOSINOPHIL # BLD AUTO: 0.09 K/UL (ref 0–0.51)
EOSINOPHIL NFR BLD: 1.2 % (ref 0–6.9)
ERYTHROCYTE [DISTWIDTH] IN BLOOD BY AUTOMATED COUNT: 46.5 FL (ref 35.9–50)
EST. AVERAGE GLUCOSE BLD GHB EST-MCNC: 223 MG/DL
GLOBULIN SER CALC-MCNC: 3.5 G/DL (ref 1.9–3.5)
GLUCOSE SERPL-MCNC: 261 MG/DL (ref 65–99)
HBA1C MFR BLD: 9.4 % (ref 0–5.6)
HCT VFR BLD AUTO: 44.7 % (ref 37–47)
HGB BLD-MCNC: 13.7 G/DL (ref 12–16)
IMM GRANULOCYTES # BLD AUTO: 0.02 K/UL (ref 0–0.11)
IMM GRANULOCYTES NFR BLD AUTO: 0.3 % (ref 0–0.9)
LYMPHOCYTES # BLD AUTO: 2.83 K/UL (ref 1–4.8)
LYMPHOCYTES NFR BLD: 36.5 % (ref 22–41)
MCH RBC QN AUTO: 28.4 PG (ref 27–33)
MCHC RBC AUTO-ENTMCNC: 30.6 G/DL (ref 33.6–35)
MCV RBC AUTO: 92.5 FL (ref 81.4–97.8)
MONOCYTES # BLD AUTO: 0.63 K/UL (ref 0–0.85)
MONOCYTES NFR BLD AUTO: 8.1 % (ref 0–13.4)
NEUTROPHILS # BLD AUTO: 4.15 K/UL (ref 2–7.15)
NEUTROPHILS NFR BLD: 53.4 % (ref 44–72)
NRBC # BLD AUTO: 0 K/UL
NRBC BLD-RTO: 0 /100 WBC
PLATELET # BLD AUTO: 171 K/UL (ref 164–446)
PMV BLD AUTO: 12.4 FL (ref 9–12.9)
POTASSIUM SERPL-SCNC: 4.2 MMOL/L (ref 3.6–5.5)
PROT SERPL-MCNC: 7.7 G/DL (ref 6–8.2)
RBC # BLD AUTO: 4.83 M/UL (ref 4.2–5.4)
SODIUM SERPL-SCNC: 136 MMOL/L (ref 135–145)
WBC # BLD AUTO: 7.8 K/UL (ref 4.8–10.8)

## 2019-08-08 PROCEDURE — 80053 COMPREHEN METABOLIC PANEL: CPT

## 2019-08-08 PROCEDURE — 99215 OFFICE O/P EST HI 40 MIN: CPT | Performed by: PHYSICIAN ASSISTANT

## 2019-08-08 PROCEDURE — 83036 HEMOGLOBIN GLYCOSYLATED A1C: CPT

## 2019-08-08 PROCEDURE — 85025 COMPLETE CBC W/AUTO DIFF WBC: CPT

## 2019-08-08 RX ORDER — CYCLOBENZAPRINE HCL 10 MG
10 TABLET ORAL 3 TIMES DAILY PRN
Qty: 30 TAB | Refills: 0 | Status: SHIPPED | OUTPATIENT
Start: 2019-08-08 | End: 2019-08-27 | Stop reason: SDUPTHER

## 2019-08-08 RX ORDER — KETOROLAC TROMETHAMINE 30 MG/ML
30 INJECTION, SOLUTION INTRAMUSCULAR; INTRAVENOUS ONCE
Status: COMPLETED | OUTPATIENT
Start: 2019-08-08 | End: 2019-08-08

## 2019-08-08 RX ADMIN — KETOROLAC TROMETHAMINE 30 MG: 30 INJECTION, SOLUTION INTRAMUSCULAR; INTRAVENOUS at 18:25

## 2019-08-08 ASSESSMENT — ENCOUNTER SYMPTOMS
FOCAL WEAKNESS: 0
NECK PAIN: 1
MUSCLE SPASMS: 1
SENSORY CHANGE: 1
FEVER: 0
TINGLING: 1

## 2019-08-08 ASSESSMENT — PAIN SCALES - GENERAL: PAINLEVEL: 10=SEVERE PAIN

## 2019-08-08 NOTE — LETTER
August 8, 2019         Patient: Caridad Nino   YOB: 1980   Date of Visit: 8/8/2019           To Whom it May Concern:    Caridad Nino was seen in my clinic on 8/8/2019. She may return to work on 8/11/19..    If you have any questions or concerns, please don't hesitate to call.        Sincerely,           Zonia Grey P.A.-C.  Electronically Signed

## 2019-08-09 NOTE — PROGRESS NOTES
"Subjective:   Caridad Nino is a 39 y.o. female who presents for Spasms (spasms and cramping in the neck and left arm for 4 days)    This is a new problem.  Patient presents to urgent care with sudden onset 4 days ago of severe neck pain radiating into the left arm with pins-and-needles sensation in the left arm along the C6 distribution.  Pain in the left arm is also described as a deep ache.  She reports stiffness of the neck with limited range of motion.  Patient has been using salon pause with no real improvement in symptoms.  Pain is rated at severe.  Patient denies any injury.  She states that her symptoms began after waking one morning and felt that she possibly slept wrong.  Patient denies prior issues with her neck.    Patient has been noted to have elevated hemoglobin A1c in the past with last A1c of 8.8.  She was referred to endocrinology at that time.  However, she states that she never heard anything regarding referral.  She is currently not on any medication to treat diabetes.  There is a strong family history of diabetes in her family.      Spasms   Associated symptoms include neck pain. Pertinent negatives include no fever.     Review of Systems   Constitutional: Negative for fever.   Musculoskeletal: Positive for muscle spasms and neck pain.   Neurological: Positive for tingling and sensory change. Negative for focal weakness.   All other systems reviewed and are negative.    Allergies   Allergen Reactions   • Amoxicillin         Objective:   /82   Pulse 94   Temp 36.7 °C (98.1 °F) (Temporal)   Resp 20   Ht 1.727 m (5' 8\")   Wt 121.6 kg (268 lb)   SpO2 96%   BMI 40.75 kg/m²   Physical Exam   Constitutional: She is oriented to person, place, and time. She appears well-developed and well-nourished. She does not appear ill. No distress.   HENT:   Head: Normocephalic and atraumatic.   Right Ear: Tympanic membrane, external ear and ear canal normal.   Left Ear: Tympanic membrane, " external ear and ear canal normal.   Nose: Nose normal.   Mouth/Throat: Uvula is midline, oropharynx is clear and moist and mucous membranes are normal. No oropharyngeal exudate.   Eyes: Pupils are equal, round, and reactive to light. Conjunctivae and EOM are normal.   Neck: Normal range of motion. Neck supple.   Cardiovascular: Normal rate, regular rhythm and normal heart sounds. Exam reveals no gallop and no friction rub.   No murmur heard.  Pulmonary/Chest: Effort normal and breath sounds normal. No respiratory distress. She has no wheezes. She has no rales.   Abdominal: Soft. Bowel sounds are normal. She exhibits no distension and no mass. There is no tenderness. There is no rebound and no guarding.   Musculoskeletal: She exhibits no edema or deformity.        Cervical back: She exhibits decreased range of motion, tenderness, bony tenderness, pain and spasm. She exhibits no swelling and no edema.        Back:    Lymphadenopathy:        Head (right side): No submental, no submandibular and no tonsillar adenopathy present.        Head (left side): No submental, no submandibular and no tonsillar adenopathy present.     She has no cervical adenopathy.        Right: No supraclavicular adenopathy present.        Left: No supraclavicular adenopathy present.   Neurological: She is alert and oriented to person, place, and time. She has normal strength. No cranial nerve deficit or sensory deficit. Coordination normal.   Reflex Scores:       Tricep reflexes are 2+ on the right side and 2+ on the left side.       Bicep reflexes are 2+ on the right side and 2+ on the left side.       Brachioradialis reflexes are 2+ on the right side and 2+ on the left side.  Skin: Skin is warm and dry. No rash noted.   Psychiatric: She has a normal mood and affect. Judgment normal.   Vitals reviewed.          Assessment/Plan:   Assessment    1. Cervical radiculopathy  - ketorolac (TORADOL) injection 30 mg  - cyclobenzaprine (FLEXERIL) 10 MG  Tab; Take 1 Tab by mouth 3 times a day as needed.  Dispense: 30 Tab; Refill: 0    2. Neck pain    3. Uncontrolled type 2 diabetes mellitus with hyperglycemia (HCC)  - REFERRAL TO FOLLOW-UP WITH PRIMARY CARE  - Comp Metabolic Panel; Future  - HEMOGLOBIN A1C; Future  - CBC WITH DIFFERENTIAL; Future    4. Morbid obesity with BMI of 40.0-44.9, adult (HCC)  - CBC WITH DIFFERENTIAL; Future    Patient is most recent hemoglobin A1c in June was elevated at 8.8.  She is not currently on any treatment for her diabetes.  I reviewed with the patient that unfortunately, given her uncontrolled untreated diabetes I am very limited and treatment options for her cervical radiculopathy.  Patient is given a lower dose of Toradol at 30 mg IM and Flexeril.  She is provided a note off work with return to work on Sunday.  Reviewed with patient she likely may need an MRI in the future if her symptoms do not improve, however, the patient states that she would not be able to tolerate this as she would require extreme sedation.    Regarding her uncontrolled untreated diabetes we will go ahead and obtain a CBC, CMP and hemoglobin A1c on serum.  Once I have these test results we will likely go ahead and start her on metformin in an effort to get her started on treatment until she is able to see primary care.  An urgent referral is placed to primary care.  Counseled patient regarding potential risks associated with untreated diabetes.    Differential diagnosis, natural history, supportive care, and indications for immediate follow-up discussed.    If not improving in 3-5 days, F/U with PCP or return to  or sooner if worsens  Red flag warning symptoms and strict ER/follow-up precautions given.     The patient demonstrated a good understanding and agreed with the treatment plan.  Please note that this note was created using voice recognition speech to text software. Every effort has been made to correct obvious errors.  However, I expect there  are errors of grammar and possibly context that were not discovered prior to finalizing the note  LYNDA Grey PA-C    8/9/19: Addendum:  Contacted patient with lab results.  Hemoglobin A1c continues to trend upwards at 9.4.  Renal function is within normal limits.  Patient will be started on metformin immediate release 500 mg nightly for 1 week, increased to 500 mg twice daily for 1 week then 500 mg in the a.m. and 2000 mg in the evening for 1 week and then increase to thousand milligrams twice daily.  Patient is encouraged to take this with food.  Side effects of medication to include possible diarrhea is discussed with patient.  Patient has not heard anything yet regarding a family practice appointment.  If she has not heard anything by Tuesday she is to reach back out to me and I will attempt to assist with coordination.  Discussed with patient that with an entry level A1c above 9 she likely will need more than one agent to manage her diabetes.  LYNDA Grey PA-C    Addendum: 8/12/19:  Patient returned call stating that she is not feeling any better and is still experiencing severe pain into the left arm.  Patient's symptoms are very specific for cervical radiculopathy likely due to cervical disc disease.  I again reiterated with the patient my limitation in management options given her uncontrolled diabetes I am not able to prescribe steroids for her at this time.  We will go ahead and attempt to obtain a MRI of the cervical spine and a referral is placed to spine surgery for further evaluation and management.  However, I did also discuss with the patient that if I am unable to schedule the MRI due to requiring prior authorization etc. she will need to follow-up with primary care regarding this.  Patient does report claustrophobia and is requesting a sedative prior to imaging.  I did agree to prescribe a one-time dose of Xanax to take prior to procedure however she will require .  Patient will have to  present to [facility] urgent care tomorrow in order to  the prescription as well as signed consent.  The patient also has questions regarding her diabetes and how this impacts her cervical spine.  I reviewed again with her in detail that the diabetes is not the cause of her symptoms however, it does limit my management options.  LYNDA Grey PA-C    Addendum: 8/13/19: MRI of the cervical spine has been ordered.  Patient is reporting she will need sedation.  Printed prescription for one-time dose of Xanax to be taken 30 minutes prior to procedure with repeat x1 if needed provided.  [facility] aware is accessed and checked for risk of abuse with no concerning finding noted. Patient will require .   LYNDA Grey PA-C

## 2019-08-13 RX ORDER — ALPRAZOLAM 1 MG/1
TABLET ORAL
Qty: 2 TAB | Refills: 0 | Status: SHIPPED | OUTPATIENT
Start: 2019-08-13 | End: 2019-08-13

## 2019-08-20 ENCOUNTER — GYNECOLOGY VISIT (OUTPATIENT)
Dept: OBGYN | Facility: MEDICAL CENTER | Age: 39
End: 2019-08-20
Payer: COMMERCIAL

## 2019-08-20 VITALS — SYSTOLIC BLOOD PRESSURE: 160 MMHG | DIASTOLIC BLOOD PRESSURE: 90 MMHG | BODY MASS INDEX: 39.68 KG/M2 | WEIGHT: 261 LBS

## 2019-08-20 DIAGNOSIS — Z30.09 STERILIZATION CONSULT: ICD-10-CM

## 2019-08-20 DIAGNOSIS — I10 HYPERTENSION, UNSPECIFIED TYPE: ICD-10-CM

## 2019-08-20 DIAGNOSIS — Z30.41 ENCOUNTER FOR SURVEILLANCE OF CONTRACEPTIVE PILLS: ICD-10-CM

## 2019-08-20 PROCEDURE — 99213 OFFICE O/P EST LOW 20 MIN: CPT | Performed by: OBSTETRICS & GYNECOLOGY

## 2019-08-21 ENCOUNTER — TELEPHONE (OUTPATIENT)
Dept: URGENT CARE | Facility: CLINIC | Age: 39
End: 2019-08-21

## 2019-08-21 ENCOUNTER — APPOINTMENT (OUTPATIENT)
Dept: RADIOLOGY | Facility: MEDICAL CENTER | Age: 39
End: 2019-08-21
Attending: PHYSICIAN ASSISTANT
Payer: COMMERCIAL

## 2019-08-21 DIAGNOSIS — M54.12 CERVICAL RADICULOPATHY: ICD-10-CM

## 2019-08-21 DIAGNOSIS — M54.2 NECK PAIN: ICD-10-CM

## 2019-08-21 PROCEDURE — 72141 MRI NECK SPINE W/O DYE: CPT

## 2019-08-21 NOTE — TELEPHONE ENCOUNTER
Contacted patient with MRI results.  MRI does indeed show bulging disc in the cervical spine at C6-7 level.  Patient states that she has heard from referrals regarding getting into [facility] however they were waiting to see if she had met her deductible.  She does have a number to call them back to follow-up and get scheduled with spine.  I also did mention the incidental finding of thyroid nodule with recommendation for ultrasound.  Patient reports she has an appointment scheduled with primary care to establish next week.  She is encouraged to discuss this with primary care at that time.  I will defer further management to primary care and [facility] [place].  Patient can certainly reach back out to me if she has any further issues or concerns.  LYNDA Grey PA-C

## 2019-08-21 NOTE — PROGRESS NOTES
S: 39-year-old using Ortho-Cyclen for contraception, last menstrual period 7/30/2019, presents for refill of OCPs.  She declines an annual exam today.  Of note blood pressure is 160/90 today.  Review of previous records reveals hypertension noted on 6/7/2019 blood pressure was 140/80.  On 11/27/2018 blood pressure was 158/87.  She has history of type 2 diabetes.  She also reports symptoms of difficulty holding her head up, left arm weakness and numbness.  She is scheduled for an MRI tomorrow.    O:/90   Wt 118.4 kg (261 lb)      A/P:  1.  Contraception counseling -I explained that oral combination contraceptives are contraindicated due to her hypertension.  We discussed progestin only alternatives to include Depo-Provera, Nexplanon, or an IUD.  She states she would like her tubes tied.  Referral placed for scheduling of laparoscopic bilateral salpingectomy.  I advised her to stop taking the Ortho-Cyclen immediately, as it is placing her at risk for stroke.  Recommend condom use or abstinence until surgery is scheduled.  2.  Internal medicine preop clearance requested, as patient has uncontrolled hypertension, morbid obesity and diabetes.  3. Breast cancer screening - mammogram recommended.  She declines.    F/U preop for laparoscopic bilateral salpingecttomy.

## 2019-08-22 PROBLEM — J18.9 PNEUMONIA DUE TO INFECTIOUS ORGANISM: Status: ACTIVE | Noted: 2019-08-22

## 2019-08-22 PROBLEM — Z86.19 HISTORY OF SHINGLES: Status: ACTIVE | Noted: 2019-08-22

## 2019-08-22 PROBLEM — K64.8 OTHER HEMORRHOIDS: Status: ACTIVE | Noted: 2019-08-22

## 2019-08-22 PROBLEM — I15.2 HYPERTENSION DUE TO ENDOCRINE DISORDER: Status: ACTIVE | Noted: 2019-08-22

## 2019-08-22 PROBLEM — R06.83 SNORING: Status: ACTIVE | Noted: 2019-08-22

## 2019-08-22 PROBLEM — K08.9 DENTAL DISORDER: Status: ACTIVE | Noted: 2019-08-22

## 2019-08-27 ENCOUNTER — OFFICE VISIT (OUTPATIENT)
Dept: MEDICAL GROUP | Facility: MEDICAL CENTER | Age: 39
End: 2019-08-27
Payer: COMMERCIAL

## 2019-08-27 VITALS
DIASTOLIC BLOOD PRESSURE: 98 MMHG | TEMPERATURE: 97.4 F | WEIGHT: 255 LBS | HEART RATE: 117 BPM | HEIGHT: 68 IN | OXYGEN SATURATION: 92 % | SYSTOLIC BLOOD PRESSURE: 138 MMHG | BODY MASS INDEX: 38.65 KG/M2

## 2019-08-27 DIAGNOSIS — I15.0 RENOVASCULAR HYPERTENSION: ICD-10-CM

## 2019-08-27 DIAGNOSIS — D69.6 THROMBOCYTOPENIA (HCC): ICD-10-CM

## 2019-08-27 DIAGNOSIS — M54.12 CERVICAL RADICULOPATHY: ICD-10-CM

## 2019-08-27 DIAGNOSIS — E04.1 THYROID NODULE: ICD-10-CM

## 2019-08-27 DIAGNOSIS — R06.83 SNORING: ICD-10-CM

## 2019-08-27 PROCEDURE — 99214 OFFICE O/P EST MOD 30 MIN: CPT | Performed by: NURSE PRACTITIONER

## 2019-08-27 RX ORDER — DICLOFENAC SODIUM 75 MG/1
75 TABLET, DELAYED RELEASE ORAL 2 TIMES DAILY
Qty: 30 TAB | Refills: 0 | Status: SHIPPED | OUTPATIENT
Start: 2019-08-27 | End: 2019-09-06 | Stop reason: SDUPTHER

## 2019-08-27 RX ORDER — CYCLOBENZAPRINE HCL 10 MG
10 TABLET ORAL NIGHTLY PRN
Qty: 30 TAB | Refills: 0 | Status: SHIPPED | OUTPATIENT
Start: 2019-08-27 | End: 2019-09-12 | Stop reason: SDUPTHER

## 2019-08-27 SDOH — HEALTH STABILITY: MENTAL HEALTH: HOW OFTEN DO YOU HAVE A DRINK CONTAINING ALCOHOL?: MONTHLY OR LESS

## 2019-08-27 ASSESSMENT — PATIENT HEALTH QUESTIONNAIRE - PHQ9: CLINICAL INTERPRETATION OF PHQ2 SCORE: 0

## 2019-08-27 NOTE — PROGRESS NOTES
Subjective:      Caridad Nino is a 39 y.o. female who presents with establish care and neck pain          HPI  Seen to establish care and for neck pain.  She has been followed by gyn.  She has a baby 3 yrs ago.   Then she went to  for her neck pain 3 weeks ago.  Pain started suddenly.  seh woke up with it.  They gave her toradol and flexeril.  Flexeril didn't help but toradol lasted 12 hrs.  Now she is back in pain.  She is having numbness in fingers originally.  Now in thumb, index and middle finger.  Also some numbness in forearm.  Sharp pain in left shoulder/chest area.  She had a MRI on her cervical spien.  It showed multilevel DDD.  Also >1 cm thyroid nodule.  She is now leaning forward from her neck pain all the time in a hunched position.    At the  appt they did a1c.  It was 9.4.  They started her on metformin.  They weaned her up to 1000 mg bid. She was gestational dm with pg.  She had dm teaching then.    She has a hx of HTN.  Her bp is elevated today and last appt.  Not on meds.      Thrombocytopenia (CMS-[facility])  plt have been low in recent past.  No bleeding sx.  Last cbc 3 wks ago was w nl    Snoring  She snores at nite.  ? If apnea.  Will do apnea link for eval        Patient Active Problem List    Diagnosis Date Noted   • Vertigo 08/14/2017     Priority: High   • Thrombocytopenia ([facility]) 08/14/2017     Priority: Low   • DM2 (diabetes mellitus, type 2) ([facility]) 08/14/2017     Priority: Low   • Other hemorrhoids 08/22/2019   • Hypertension due to endocrine disorder 08/22/2019   • Dental disorder 08/22/2019   • History of shingles 08/22/2019   • Pneumonia due to infectious organism 08/22/2019   • Snoring 08/22/2019   • Normocytic anemia 08/15/2017   • Morbid obesity due to excess calories ([facility]) 12/03/2016     Current Outpatient Medications   Medication Sig Dispense Refill   • diclofenac EC (VOLTAREN) 75 MG Tablet Delayed Response Take 1 Tab by mouth 2 times a day. 30 Tab 0   • cyclobenzaprine  (FLEXERIL) 10 MG Tab Take 1 Tab by mouth at bedtime as needed. 30 Tab 0   • metFORMIN (GLUCOPHAGE) 500 MG Tab 1 tab po qhs x 1 week, 1 tab po bid x 1 week, 1 tab po q am and 2 tabs po qhs x 1 week, then 2 tabs po bid. Take with food 70 Tab 0   • fluticasone (FLONASE) 50 MCG/ACT nasal spray Spray 1 Spray in nose 2 times a day. 1 Bottle 0     No current facility-administered medications for this visit.      Amoxicillin  Social History     Socioeconomic History   • Marital status:      Spouse name: Not on file   • Number of children: Not on file   • Years of education: Not on file   • Highest education level: Not on file   Occupational History   • Not on file   Social Needs   • Financial resource strain: Not on file   • Food insecurity:     Worry: Not on file     Inability: Not on file   • Transportation needs:     Medical: Not on file     Non-medical: Not on file   Tobacco Use   • Smoking status: Former Smoker     Packs/day: 1.00     Years: 27.00     Pack years: 27.00     Types: Cigarettes     Start date: 1/1/1989     Last attempt to quit: 4/5/2016     Years since quitting: 3.3   • Smokeless tobacco: Never Used   Substance and Sexual Activity   • Alcohol use: Yes     Frequency: Monthly or less   • Drug use: No   • Sexual activity: Yes     Partners: Male   Lifestyle   • Physical activity:     Days per week: Not on file     Minutes per session: Not on file   • Stress: Not on file   Relationships   • Social connections:     Talks on phone: Not on file     Gets together: Not on file     Attends [employer] service: Not on file     Active member of club or organization: Not on file     Attends meetings of clubs or organizations: Not on file     Relationship status: Not on file   • Intimate partner violence:     Fear of current or ex partner: Not on file     Emotionally abused: Not on file     Physically abused: Not on file     Forced sexual activity: Not on file   Other Topics Concern   • Not on file   Social History  Narrative   • Not on file     Past Medical History:   Diagnosis Date   • Dental disorder    • Hemorrhoids 1998   • History of shingles June/July 2016   • Hypertension 1997    JUST DURING PREGNANCY   • Morbid obesity due to excess calories ([facility]) 12/3/2016   • Normocytic anemia 8/15/2017   • Pneumonia    • Snoring    • Thrombocytopenia ([facility]) 8/14/2017   • Type 2 diabetes mellitus without complication, without long-term current use of insulin ([facility])    • Vertigo 8/14/2017     ROS  Review of Systems   Constitutional: Negative.  Negative for fever, chills, weight loss, malaise/fatigue and diaphoresis.   HENT: Negative.  Negative for hearing loss, ear pain, nosebleeds, congestion, sore throat, neck pain, tinnitus and ear discharge.    Eyes: Negative.  Negative for blurred vision, double vision, photophobia, pain, discharge and redness.   Respiratory: Negative.  Negative for cough, hemoptysis, sputum production, shortness of breath, wheezing and stridor.    Cardiovascular: Negative.  Negative for chest pain, palpitations, orthopnea, claudication, leg swelling and PND.   Gastrointestinal: Negative.  Negative for heartburn, nausea, vomiting, abdominal pain, diarrhea, constipation, blood in stool and melena.   Genitourinary: Negative.  Negative for dysuria, urgency, frequency, incontinence, hematuria and flank pain.   Musculoskeletal: Negative.  Negative for myalgias, joint pain and falls.   Skin: Negative.  Negative for itching and rash.   Neurological: Negative.  Negative for dizziness, tingling, tremors, sensory change, speech change, focal weakness, seizures, loss of consciousness, weakness and headaches.   Endo/Heme/Allergies: Negative.  Negative for environmental allergies and polydipsia. Does not bruise/bleed easily.   Psychiatric/Behavioral: Negative.  Negative for depression, suicidal ideas, hallucinations, memory loss and substance abuse. The patient is not nervous/anxious and does not have insomnia.    All other  "systems reviewed and are negative.         Objective:     /98 (BP Location: Right arm, Patient Position: Sitting)   Pulse (!) 117   Temp 36.3 °C (97.4 °F) (Temporal)   Ht 1.727 m (5' 8\")   Wt 115.7 kg (255 lb)   LMP 07/26/2019   SpO2 92%   Breastfeeding? No   BMI 38.77 kg/m²      Physical Exam  Physical Exam   Vitals reviewed.  Constitutional: oriented to person, place, and time. appears well-developed and well-nourished. No distress.   HENT: Head: Normocephalic and atraumatic. Bilateral tympanic membranes wnl w/o bulging.  Right Ear: External ear normal. Left Ear: External ear normal. Nose: Nose normal.  Mouth/Throat: Oropharynx is clear and moist. No oropharyngeal exudate. stevie tm wnl. Eyes: Conjunctivae and EOM are normal. Pupils are equal, round, and reactive to light. Right eye exhibits no discharge. Left eye exhibits no discharge. No scleral icterus.    Neck: Normal range of motion. Neck supple. No JVD present.   Cardiovascular: Normal rate, regular rhythm, normal heart sounds and intact distal pulses.  Exam reveals no gallop and no friction rub.  No murmur heard.  No carotid bruits   Pulmonary/Chest: Effort normal and breath sounds normal. No stridor. No respiratory distress. no wheezes or rales. exhibits no tenderness.   Abdominal: Soft. Bowel sounds are normal. exhibits no distension and no mass. No tenderness. no rebound and no guarding.   Musculoskeletal: hunched forward from neck with limited cervical range of motion. exhibits no edema.  Left neck and shoulder tenderness with ROM.  stevie pedal pulses 2+.  Lymphadenopathy:  no cervical or supraclavicular adenopathy.   Neurological: alert and oriented to person, place, and time. has normal reflexes. displays normal reflexes. No cranial nerve deficit. exhibits normal muscle tone. Coordination normal.  dec sensation in hand and forearm left  Skin: Skin is warm and dry. No rash noted. no diaphoresis. No erythema. No pallor.   Psychiatric: normal " mood and affect. behavior is normal.        Assessment/Plan:     1. Cervical radiculopathy  diclofenac EC (VOLTAREN) 75 MG Tablet Delayed Response    cyclobenzaprine (FLEXERIL) 10 MG Tab    REFERRAL TO PHYSICAL THERAPY Reason for Therapy: Eval/Treat/Report    use voltaren bid x 14 days.  continue flexeril.  if not improved consider physiatry referral.  try PT.      2. Type II diabetes mellitus with complication, uncontrolled (HCC)  MICROALBUMIN CREAT RATIO URINE    Lipid Profile    new dx and not controlled.  continue metformin 1000 mg bid.  call with glucose monitor that ins will cover.  i will order.  seh will ck glucose bid   3. Thyroid nodule  US-SOFT TISSUES OF HEAD - NECK    TSH    FREE THYROXINE    THYROID PEROXIDASE  (TPO) AB    seen on recent cervical MRI.  check neck us and if nodule still > 1cm will do bx.  check TSH, TPO, T4   4. Snoring      do apnea link.  f/u with pt to review   5. Thrombocytopenia (HCC)      stable at this time.  will continue to monitor   6. Renovascular hypertension      not on meds.  monitor bp.  f/u 2 wks.  if bp still elevated will start lisinopril.  CHECK LAB with alb/cr ratio and LP         6.  F/u wiht me and cristofer in 2 weeks.  Monitor glucoses and bp

## 2019-09-05 LAB
ALBUMIN/CREAT UR: 86.8 MG/G CREAT (ref 0–30)
CHOLEST SERPL-MCNC: 189 MG/DL (ref 100–199)
CREAT UR-MCNC: 188.7 MG/DL
HDLC SERPL-MCNC: 41 MG/DL
LABORATORY COMMENT REPORT: ABNORMAL
LDLC SERPL CALC-MCNC: 81 MG/DL (ref 0–99)
MICROALBUMIN UR-MCNC: 163.7 UG/ML
T4 FREE SERPL-MCNC: 1.42 NG/DL (ref 0.82–1.77)
THYROPEROXIDASE AB SERPL-ACNC: 14 IU/ML (ref 0–34)
TRIGL SERPL-MCNC: 335 MG/DL (ref 0–149)
TSH SERPL DL<=0.005 MIU/L-ACNC: 3.23 UIU/ML (ref 0.45–4.5)
VLDLC SERPL CALC-MCNC: 67 MG/DL (ref 5–40)

## 2019-09-06 ENCOUNTER — HOSPITAL ENCOUNTER (OUTPATIENT)
Dept: RADIOLOGY | Facility: MEDICAL CENTER | Age: 39
End: 2019-09-06
Attending: NURSE PRACTITIONER
Payer: COMMERCIAL

## 2019-09-06 DIAGNOSIS — E11.65 UNCONTROLLED TYPE 2 DIABETES MELLITUS WITH HYPERGLYCEMIA (HCC): ICD-10-CM

## 2019-09-06 DIAGNOSIS — M54.12 CERVICAL RADICULOPATHY: ICD-10-CM

## 2019-09-06 DIAGNOSIS — E04.1 THYROID NODULE: ICD-10-CM

## 2019-09-06 PROCEDURE — 76536 US EXAM OF HEAD AND NECK: CPT

## 2019-09-06 RX ORDER — DICLOFENAC SODIUM 75 MG/1
75 TABLET, DELAYED RELEASE ORAL 2 TIMES DAILY
Qty: 30 TAB | Refills: 0 | Status: SHIPPED | OUTPATIENT
Start: 2019-09-06 | End: 2019-09-25 | Stop reason: SDUPTHER

## 2019-09-09 ENCOUNTER — TELEPHONE (OUTPATIENT)
Dept: MEDICAL GROUP | Facility: MEDICAL CENTER | Age: 39
End: 2019-09-09

## 2019-09-09 PROBLEM — E04.2 MULTIPLE THYROID NODULES: Status: ACTIVE | Noted: 2019-09-09

## 2019-09-12 ENCOUNTER — TELEPHONE (OUTPATIENT)
Dept: MEDICAL GROUP | Facility: MEDICAL CENTER | Age: 39
End: 2019-09-12

## 2019-09-12 DIAGNOSIS — M54.12 CERVICAL RADICULOPATHY: ICD-10-CM

## 2019-09-12 DIAGNOSIS — M54.2 CHRONIC NECK PAIN: ICD-10-CM

## 2019-09-12 DIAGNOSIS — G89.29 CHRONIC NECK PAIN: ICD-10-CM

## 2019-09-12 RX ORDER — CYCLOBENZAPRINE HCL 10 MG
10 TABLET ORAL NIGHTLY PRN
Qty: 30 TAB | Refills: 0 | Status: SHIPPED | OUTPATIENT
Start: 2019-09-12 | End: 2019-09-25 | Stop reason: SDUPTHER

## 2019-09-12 RX ORDER — TRAMADOL HYDROCHLORIDE 50 MG/1
50 TABLET ORAL EVERY 4 HOURS PRN
Qty: 24 TAB | Refills: 0 | Status: SHIPPED
Start: 2019-09-12 | End: 2019-09-25 | Stop reason: SDUPTHER

## 2019-09-12 NOTE — TELEPHONE ENCOUNTER
Tc to pt.  Reviewed neck us with pt with lg thyroid nodule.  She is agreeable to the bx but having so much neck pain she cannot lye down.  Will have to get pain under control to do bx.  Will send in ultram for her, refill flexeril.  She just refilled the voltaren.  Will fu on 9/25/19 for appt.

## 2019-09-17 DIAGNOSIS — E04.1 THYROID NODULE: ICD-10-CM

## 2019-09-25 ENCOUNTER — OFFICE VISIT (OUTPATIENT)
Dept: MEDICAL GROUP | Facility: MEDICAL CENTER | Age: 39
End: 2019-09-25
Payer: COMMERCIAL

## 2019-09-25 VITALS
OXYGEN SATURATION: 96 % | BODY MASS INDEX: 40.62 KG/M2 | TEMPERATURE: 97.4 F | HEART RATE: 108 BPM | WEIGHT: 268 LBS | SYSTOLIC BLOOD PRESSURE: 118 MMHG | DIASTOLIC BLOOD PRESSURE: 90 MMHG | HEIGHT: 68 IN

## 2019-09-25 DIAGNOSIS — M54.12 CERVICAL RADICULOPATHY: ICD-10-CM

## 2019-09-25 DIAGNOSIS — M54.2 CHRONIC NECK PAIN: ICD-10-CM

## 2019-09-25 DIAGNOSIS — G89.29 CHRONIC NECK PAIN: ICD-10-CM

## 2019-09-25 PROCEDURE — 99214 OFFICE O/P EST MOD 30 MIN: CPT | Performed by: NURSE PRACTITIONER

## 2019-09-25 RX ORDER — DICLOFENAC SODIUM 75 MG/1
75 TABLET, DELAYED RELEASE ORAL 2 TIMES DAILY
Qty: 30 TAB | Refills: 0 | Status: SHIPPED | OUTPATIENT
Start: 2019-09-25 | End: 2019-10-10 | Stop reason: SDUPTHER

## 2019-09-25 RX ORDER — GABAPENTIN 100 MG/1
100 CAPSULE ORAL 3 TIMES DAILY
Qty: 30 CAP | Refills: 1 | Status: SHIPPED | OUTPATIENT
Start: 2019-09-25 | End: 2019-10-10 | Stop reason: SDUPTHER

## 2019-09-25 RX ORDER — TRAMADOL HYDROCHLORIDE 50 MG/1
50 TABLET ORAL EVERY 4 HOURS PRN
Qty: 90 TAB | Refills: 0 | Status: SHIPPED | OUTPATIENT
Start: 2019-09-25 | End: 2019-10-25

## 2019-09-25 RX ORDER — CYCLOBENZAPRINE HCL 10 MG
10 TABLET ORAL NIGHTLY PRN
Qty: 30 TAB | Refills: 0 | Status: SHIPPED | OUTPATIENT
Start: 2019-09-25 | End: 2019-10-07 | Stop reason: SDUPTHER

## 2019-09-25 NOTE — PROGRESS NOTES
Subjective:     Caridad Nino is a 39 y.o. female who presents with stevie arm numbness.    HPI:   Seen in f/u for stevie arm numbness.  She continues to have arm numbness.  Was originally only on the left.  Now on the rt.  That started 3 days ago.  Starting at fingertips.  Worse on left index finger.  She had cervical MRI with extensive disease wnad central cord stenssis.  She is going to start PT soon.  She is using ultram to control her pain.  Needs refill.  She had a neck us that showed thyroid nodule that is large.  She is set for bx tomorrow.  Very scared d/t FH of cancer.       Subjective:           Patient Active Problem List    Diagnosis Date Noted   • Vertigo 08/14/2017     Priority: High   • Thrombocytopenia (HCC) 08/14/2017     Priority: Low   • DM2 (diabetes mellitus, type 2) (HCC) 08/14/2017     Priority: Low   • Other hemorrhoids 08/22/2019   • Hypertension due to endocrine disorder 08/22/2019   • Dental disorder 08/22/2019   • History of shingles 08/22/2019   • Pneumonia due to infectious organism 08/22/2019   • Snoring 08/22/2019   • Normocytic anemia 08/15/2017   • Morbid obesity due to excess calories (HCC) 12/03/2016       Current medicines (including changes today)  Current Outpatient Medications   Medication Sig Dispense Refill   • tramadol (ULTRAM) 50 MG Tab Take 1 Tab by mouth every four hours as needed for up to 30 days. 90 Tab 0   • gabapentin (NEURONTIN) 100 MG Cap Take 1 Cap by mouth 3 times a day. 30 Cap 1   • diclofenac EC (VOLTAREN) 75 MG Tablet Delayed Response Take 1 Tab by mouth 2 times a day. 30 Tab 0   • cyclobenzaprine (FLEXERIL) 10 MG Tab Take 1 Tab by mouth at bedtime as needed. 30 Tab 0   • metFORMIN (GLUCOPHAGE) 500 MG Tab 1 tab po qhs x 1 week, 1 tab po bid x 1 week, 1 tab po q am and 2 tabs po qhs x 1 week, then 2 tabs po bid. Take with food 360 Tab 0   • fluticasone (FLONASE) 50 MCG/ACT nasal spray Spray 1 Spray in nose 2 times a day. 1 Bottle 0     No current  "facility-administered medications for this visit.        Allergies   Allergen Reactions   • Amoxicillin        ROS  Constitutional: Negative. Negative for fever, chills, weight loss, malaise/fatigue and diaphoresis.   HENT: Negative. Negative for hearing loss, ear pain, nosebleeds, congestion, sore throat, neck pain, tinnitus and ear discharge.   Respiratory: Negative. Negative for cough, hemoptysis, sputum production, shortness of breath, wheezing and stridor.   Cardiovascular: Negative. Negative for chest pain, palpitations, orthopnea, claudication, leg swelling and PND.   Gastrointestinal: Denies nausea, vomiting, diarrhea, constipation, heartburn, melena or hematochezia.  Genitourinary: Denies dysuria, hematuria, urinary incontinence, frequency or urgency.        Objective:     /90 (BP Location: Right arm, Patient Position: Sitting)   Pulse (!) 108   Temp 36.3 °C (97.4 °F) (Temporal)   Ht 1.727 m (5' 8\")   Wt 121.6 kg (268 lb)   SpO2 96%  Body mass index is 40.75 kg/m².    Physical Exam:  Vitals reviewed.  Constitutional: Oriented to person, place, and time. appears well-developed and well-nourished. No distress.   Cardiovascular: Normal rate, regular rhythm, normal heart sounds and intact distal pulses. Exam reveals no gallop and no friction rub. No murmur heard. No carotid bruits.   Pulmonary/Chest: Effort normal and breath sounds normal. No stridor. No respiratory distress. no wheezes or rales. exhibits no tenderness.   Musculoskeletal: Normal range of motion. exhibits no edema. stevie pedal pulses 2+.  Lymphadenopathy: No cervical or supraclavicular adenopathy.   Neurological: Alert and oriented to person, place, and time. exhibits normal muscle tone.  Skin: Skin is warm and dry. No diaphoresis.   Psychiatric: Normal mood and affect. Behavior is normal.      Assessment and Plan:     The following treatment plan was discussed:    1. Cervical radiculopathy  REFERRAL TO PHYSIATRY (PMR)    REFERRAL TO " NEUROSURGERY    tramadol (ULTRAM) 50 MG Tab    gabapentin (NEURONTIN) 100 MG Cap    diclofenac EC (VOLTAREN) 75 MG Tablet Delayed Response    cyclobenzaprine (FLEXERIL) 10 MG Tab    use voltaren bid x 14 days.  continue flexeril.  if not improved consider physiatry referral.  try PT.      2. Chronic neck pain  REFERRAL TO PHYSIATRY (PMR)    REFERRAL TO NEUROSURGERY    tramadol (ULTRAM) 50 MG Tab    gabapentin (NEURONTIN) 100 MG Cap    diclofenac EC (VOLTAREN) 75 MG Tablet Delayed Response    cyclobenzaprine (FLEXERIL) 10 MG Tab         Followup: Return if symptoms worsen or fail to improve.

## 2019-09-26 ENCOUNTER — HOSPITAL ENCOUNTER (OUTPATIENT)
Dept: RADIOLOGY | Facility: MEDICAL CENTER | Age: 39
End: 2019-09-26
Attending: NURSE PRACTITIONER
Payer: COMMERCIAL

## 2019-09-26 ENCOUNTER — APPOINTMENT (OUTPATIENT)
Dept: PHYSICAL THERAPY | Facility: MEDICAL CENTER | Age: 39
End: 2019-09-26
Payer: COMMERCIAL

## 2019-09-26 DIAGNOSIS — E04.1 THYROID NODULE: ICD-10-CM

## 2019-09-26 PROCEDURE — 88112 CYTOPATH CELL ENHANCE TECH: CPT

## 2019-09-26 PROCEDURE — 10005 FNA BX W/US GDN 1ST LES: CPT

## 2019-09-26 NOTE — PROGRESS NOTES
OPIR    US guided Right thyroid nodule fine needle aspiration done by Dr. See; NON-SEDATION  (no H&P required as this is a NON SEDATION procedure) Right anterior aspect of neck access site; 1 jar of cytolyt obtained and sent to pathology lab; pt tolerated the procedure well;  all questions and concerns answered prior to being d/c; patient provided with appropriate education for procedure; pt d/c home.

## 2019-09-27 LAB — CYTOLOGY REG CYTOL: NORMAL

## 2019-09-30 ENCOUNTER — TELEPHONE (OUTPATIENT)
Dept: MEDICAL GROUP | Facility: MEDICAL CENTER | Age: 39
End: 2019-09-30

## 2019-10-01 ENCOUNTER — APPOINTMENT (OUTPATIENT)
Dept: PHYSICAL THERAPY | Facility: MEDICAL CENTER | Age: 39
End: 2019-10-01
Payer: COMMERCIAL

## 2019-10-07 ENCOUNTER — OFFICE VISIT (OUTPATIENT)
Dept: MEDICAL GROUP | Facility: MEDICAL CENTER | Age: 39
End: 2019-10-07
Payer: COMMERCIAL

## 2019-10-07 VITALS
HEIGHT: 68 IN | HEART RATE: 103 BPM | SYSTOLIC BLOOD PRESSURE: 156 MMHG | WEIGHT: 271.6 LBS | OXYGEN SATURATION: 92 % | TEMPERATURE: 98.6 F | BODY MASS INDEX: 41.16 KG/M2 | DIASTOLIC BLOOD PRESSURE: 60 MMHG

## 2019-10-07 DIAGNOSIS — M54.2 CHRONIC NECK PAIN: ICD-10-CM

## 2019-10-07 DIAGNOSIS — I15.2 HYPERTENSION DUE TO ENDOCRINE DISORDER: ICD-10-CM

## 2019-10-07 DIAGNOSIS — R06.02 SOB (SHORTNESS OF BREATH): ICD-10-CM

## 2019-10-07 DIAGNOSIS — G89.29 CHRONIC NECK PAIN: ICD-10-CM

## 2019-10-07 DIAGNOSIS — M54.12 CERVICAL RADICULOPATHY: ICD-10-CM

## 2019-10-07 PROCEDURE — 99214 OFFICE O/P EST MOD 30 MIN: CPT | Performed by: NURSE PRACTITIONER

## 2019-10-07 RX ORDER — ALBUTEROL SULFATE 90 UG/1
2 AEROSOL, METERED RESPIRATORY (INHALATION) EVERY 6 HOURS PRN
Qty: 8.5 G | Refills: 1 | Status: SHIPPED | OUTPATIENT
Start: 2019-10-07 | End: 2021-05-20

## 2019-10-07 RX ORDER — CYCLOBENZAPRINE HCL 10 MG
10 TABLET ORAL 2 TIMES DAILY PRN
Qty: 60 TAB | Refills: 1 | Status: SHIPPED | OUTPATIENT
Start: 2019-10-07 | End: 2019-12-02 | Stop reason: SDUPTHER

## 2019-10-07 RX ORDER — LISINOPRIL 10 MG/1
10 TABLET ORAL DAILY
Qty: 30 TAB | Refills: 1 | Status: SHIPPED | OUTPATIENT
Start: 2019-10-07 | End: 2019-12-02

## 2019-10-07 NOTE — PROGRESS NOTES
RN-ESHAE Note    Subjective:     Health changes since last visit/interval Hx: Caridad is a new patient to me.  She has type II DM treated with metformin.    Medications (including changes made today)  Current Outpatient Medications   Medication Sig Dispense Refill   • tramadol (ULTRAM) 50 MG Tab Take 1 Tab by mouth every four hours as needed for up to 30 days. 90 Tab 0   • gabapentin (NEURONTIN) 100 MG Cap Take 1 Cap by mouth 3 times a day. 30 Cap 1   • diclofenac EC (VOLTAREN) 75 MG Tablet Delayed Response Take 1 Tab by mouth 2 times a day. 30 Tab 0   • cyclobenzaprine (FLEXERIL) 10 MG Tab Take 1 Tab by mouth at bedtime as needed. 30 Tab 0   • metFORMIN (GLUCOPHAGE) 500 MG Tab 1 tab po qhs x 1 week, 1 tab po bid x 1 week, 1 tab po q am and 2 tabs po qhs x 1 week, then 2 tabs po bid. Take with food 360 Tab 0   • fluticasone (FLONASE) 50 MCG/ACT nasal spray Spray 1 Spray in nose 2 times a day. 1 Bottle 0     No current facility-administered medications for this visit.        Taking daily ASA: No  Taking above medications as prescribed: yes  SIDE EFFECTS: Patient denies side effects to medications    Exercise: walking throughout the day  Diet: meals per day on average: 3 + snacks, drinks juice and milk  Patient's body mass index is 41.3 kg/m². Exercise and nutrition counseling were performed at this visit.      Health Maintenance:   Health Maintenance Due   Topic Date Due   • DIABETES MONOFILAMENT / LE EXAM  1978   • RETINAL SCREENING  06/28/1996   • IMM HEP B VACCINE (1 of 3 - Risk 3-dose series) 06/28/1997   • MAMMOGRAM  06/15/2019   • IMM INFLUENZA (1) 09/01/2019       Immunizations:   PPSV23: Up-to-date  Xcoxdtq07: N/A  Tdap: Up-to-date  Flu: having at work Wednesday  Hep B: Declined    DM:   Last A1c:   Lab Results   Component Value Date/Time    HBA1C 9.4 (H) 08/08/2019 06:43 PM      A1C GOAL: < 7    Glucose monitoring frequency: daily  126-207  Hypoglycemic episodes: no    Last Retinal Exam: Due  Daily Foot  Exam: Yes advised  Routine Dental Exams: Yes    Lab Results   Component Value Date/Time    MICRALB 163.7 09/03/2019 11:25 AM        ACR Albumin/Creatinine Ratio goal <30     HTN:   Blood pressure goal <140/<80 no.   Currently Rx ACE/ARB: No    Dyslipidemia:    Lab Results   Component Value Date/Time    CHOLSTRLTOT 189 09/03/2019 11:25 AM    CHOLSTRLTOT 139 08/15/2017 05:50 AM    LDL 81 09/03/2019 11:25 AM    LDL 51 08/15/2017 05:50 AM    HDL 41 09/03/2019 11:25 AM    HDL 34 (A) 08/15/2017 05:50 AM    TRIGLYCERIDE 335 (H) 09/03/2019 11:25 AM    TRIGLYCERIDE 270 (H) 08/15/2017 05:50 AM       Lab Results   Component Value Date/Time    SODIUM 136 08/08/2019 06:43 PM    POTASSIUM 4.2 08/08/2019 06:43 PM    CHLORIDE 100 08/08/2019 06:43 PM    CO2 29 08/08/2019 06:43 PM    GLUCOSE 261 (H) 08/08/2019 06:43 PM    BUN 15 08/08/2019 06:43 PM    CREATININE 0.60 08/08/2019 06:43 PM     Lab Results   Component Value Date/Time    ALKPHOSPHAT 63 08/08/2019 06:43 PM    ASTSGOT 32 08/08/2019 06:43 PM    ALTSGPT 29 08/08/2019 06:43 PM    TBILIRUBIN 0.3 08/08/2019 06:43 PM        Currently Rx Statin: No    She  reports that she quit smoking about 3 years ago. Her smoking use included cigarettes. She started smoking about 30 years ago. She has a 27.00 pack-year smoking history. She has never used smokeless tobacco.    Objective:     Exam:  Monofilament: done   Monofilament testing with a 10 gram force: sensation intact: intact bilaterally  Visual Inspection: Feet without maceration, ulcers, fissures.  Bilateral large toenails ingrown and tender  Pedal pulses: intact bilaterally    Plan:     Discussed and educated on:   - All medications, side effects and compliance (discussed carefully)  - Annual eye examinations at Ophthalmology  - Diabetic Meal Plan: foods that contain carbs and plate method  - Foot Care: what to look for when checking feet every day and when to contact HCP  - HbA1C: target and what A1C is  - Home glucose monitoring  emphasized  - Interpretation of Lab Results  - Long term diabetic complications  - Reminded pt to bring in BS diary at next visit  - Testing Blood Glucose: when to test, recording blood sugars, target range for FSBS and when to contact HCP  - Weight control and daily exercise    Recommended medication changes: Consider ACEI

## 2019-10-07 NOTE — PROGRESS NOTES
Subjective:     Caridad Nino is a 39 y.o. female who presents with DM.    HPI:   Seen in f/u for DM.  She just saw Migdalia.  She is going to put her on Jardiance.  Will f/u with her in 1 mo.  Has ingrown toenails. They are tender.  Will refer to podiatry.   Her BP is elevated and she has proteinuria.  Will start lisinopril.  Reviewed risks and benefits of lisinopril with pt including but not limited to: cough, angioedema.  Reviewed benign bx of thyroid nodule.  seh can feel the nodule when she swallows.  Not causing any distress.  Doesn't want removed at this time.   She was working on her dogs nails yesterdya.  Got suddenly SOB.  Had chest pressure.  She has an inhaler from a past er visit.  That helped her sx.  Scared her.   She was having numbness in left arm but now its also in rt arm.  She has had MRI with multiple levels of DDD.  She is going to do PT.   Reviewed lab with tp.  Her a1c in august was 9.4.    TSH, T4, TPO is wnl.    Alb/cr ratio is elevated at 86.    LP shows elevated trg at 335.  HDL is better than last time.  Up from 34 to 41.  LDL is good at 81.  Not on meds.        Patient Active Problem List    Diagnosis Date Noted   • Vertigo 08/14/2017     Priority: High   • Thrombocytopenia (HCC) 08/14/2017     Priority: Low   • DM2 (diabetes mellitus, type 2) ([facility]) 08/14/2017     Priority: Low   • Other hemorrhoids 08/22/2019   • Hypertension due to endocrine disorder 08/22/2019   • Dental disorder 08/22/2019   • History of shingles 08/22/2019   • Pneumonia due to infectious organism 08/22/2019   • Snoring 08/22/2019   • Normocytic anemia 08/15/2017   • Morbid obesity due to excess calories ([facility]) 12/03/2016       Current medicines (including changes today)  Current Outpatient Medications   Medication Sig Dispense Refill   • Empagliflozin (JARDIANCE) 10 MG Tab Take 10 mg by mouth every morning before breakfast. 30 Tab 11   • Empagliflozin (JARDIANCE) 10 MG Tab Take 10 mg by mouth every  "morning before breakfast. 30 Tab 11   • lisinopril (PRINIVIL) 10 MG Tab Take 1 Tab by mouth every day. 30 Tab 1   • albuterol (VENTOLIN HFA) 108 (90 Base) MCG/ACT Aero Soln inhalation aerosol Inhale 2 Puffs by mouth every 6 hours as needed for Shortness of Breath. 8.5 g 1   • cyclobenzaprine (FLEXERIL) 10 MG Tab Take 1 Tab by mouth 2 times a day as needed. 60 Tab 1   • tramadol (ULTRAM) 50 MG Tab Take 1 Tab by mouth every four hours as needed for up to 30 days. 90 Tab 0   • gabapentin (NEURONTIN) 100 MG Cap Take 1 Cap by mouth 3 times a day. 30 Cap 1   • diclofenac EC (VOLTAREN) 75 MG Tablet Delayed Response Take 1 Tab by mouth 2 times a day. 30 Tab 0   • metFORMIN (GLUCOPHAGE) 500 MG Tab 1 tab po qhs x 1 week, 1 tab po bid x 1 week, 1 tab po q am and 2 tabs po qhs x 1 week, then 2 tabs po bid. Take with food 360 Tab 0   • fluticasone (FLONASE) 50 MCG/ACT nasal spray Spray 1 Spray in nose 2 times a day. 1 Bottle 0     No current facility-administered medications for this visit.        Allergies   Allergen Reactions   • Amoxicillin        ROS  Constitutional: Negative. Negative for fever, chills, weight loss, malaise/fatigue and diaphoresis.   HENT: Negative. Negative for hearing loss, ear pain, nosebleeds, congestion, sore throat, neck pain, tinnitus and ear discharge.   Respiratory: Negative. Negative for cough, hemoptysis, sputum production, shortness of breath, wheezing and stridor.   Cardiovascular: Negative. Negative for chest pain, palpitations, orthopnea, claudication, leg swelling and PND.   Gastrointestinal: Denies nausea, vomiting, diarrhea, constipation, heartburn, melena or hematochezia.  Genitourinary: Denies dysuria, hematuria, urinary incontinence, frequency or urgency.        Objective:     /60   Pulse (!) 103   Temp 37 °C (98.6 °F)   Ht 1.727 m (5' 8\")   Wt 123.2 kg (271 lb 9.6 oz)   SpO2 92%  Body mass index is 41.3 kg/m².    Physical Exam:  Vitals reviewed.  Constitutional: Oriented to " person, place, and time. appears well-developed and well-nourished. No distress.   Cardiovascular: Normal rate, regular rhythm, normal heart sounds and intact distal pulses. Exam reveals no gallop and no friction rub. No murmur heard. No carotid bruits.   Pulmonary/Chest: Effort normal and breath sounds normal. No stridor. No respiratory distress. no wheezes or rales. exhibits no tenderness.   Musculoskeletal: Normal range of motion. exhibits no edema. stevie pedal pulses 2+.  Lymphadenopathy: No cervical or supraclavicular adenopathy.   Neurological: Alert and oriented to person, place, and time. exhibits normal muscle tone.  Skin: Skin is warm and dry. No diaphoresis.   Psychiatric: Normal mood and affect. Behavior is normal.      Assessment and Plan:     The following treatment plan was discussed:    1. Hypertension due to endocrine disorder  lisinopril (PRINIVIL) 10 MG Tab    bp high and protein in urine.  start lisinopril.  f/u 1 mo for bp check   2. DM (diabetes mellitus) type II uncontrolled, periph vascular disorder (HCC)  Empagliflozin (JARDIANCE) 10 MG Tab    Diabetic Monofilament LE Exam    HEMOGLOBIN A1C    REFERRAL TO PODIATRY    Empagliflozin (JARDIANCE) 10 MG Tab    REFERRAL TO DIABETIC EDUCATION Diabetes Self Management Education / Training (DSME/T) and Medical Nutrition Therapy (MNT): Initial Group DSME/MNT as authorized by payor, Follow-Up DSME/MNT as authorized by payor; DSME/T Content: Monitoring Diabete...    seen by cristofer.  f/u with both me and cristofer in 1 mo.     3. Cervical radiculopathy  cyclobenzaprine (FLEXERIL) 10 MG Tab    use voltaren bid x 14 days.  continue flexeril.  if not improved consider physiatry referral.  try PT.      4. Chronic neck pain  cyclobenzaprine (FLEXERIL) 10 MG Tab    referred for PT.     5. SOB (shortness of breath)  PULMONARY FUNCTION TESTS -Test requested: Complete Pulmonary Function Test; Include MIPS/MEPS? Yes    EKG - Clinic Performed    albuterol (VENTOLIN  HFA) 108 (90 Base) MCG/ACT Aero Soln inhalation aerosol    EKG in office - wnl.  no acute changes.  do PFT's.  gave pt inhaler.  f/u with pt wiht results to PFts.   EKG in office read by me:  Wnl.  NSR      Followup: Return in about 4 weeks (around 11/4/2019), or with Jemma.

## 2019-10-08 ENCOUNTER — APPOINTMENT (OUTPATIENT)
Dept: PHYSICAL THERAPY | Facility: MEDICAL CENTER | Age: 39
End: 2019-10-08
Payer: COMMERCIAL

## 2019-10-10 DIAGNOSIS — M54.12 CERVICAL RADICULOPATHY: ICD-10-CM

## 2019-10-10 DIAGNOSIS — M54.2 CHRONIC NECK PAIN: ICD-10-CM

## 2019-10-10 DIAGNOSIS — G89.29 CHRONIC NECK PAIN: ICD-10-CM

## 2019-10-10 RX ORDER — DICLOFENAC SODIUM 75 MG/1
75 TABLET, DELAYED RELEASE ORAL 2 TIMES DAILY
Qty: 30 TAB | Refills: 0 | Status: SHIPPED | OUTPATIENT
Start: 2019-10-10 | End: 2019-10-31 | Stop reason: SDUPTHER

## 2019-10-10 RX ORDER — GABAPENTIN 100 MG/1
100 CAPSULE ORAL 3 TIMES DAILY
Qty: 90 CAP | Refills: 1 | Status: SHIPPED | OUTPATIENT
Start: 2019-10-10 | End: 2019-12-02 | Stop reason: SDUPTHER

## 2019-10-22 ENCOUNTER — APPOINTMENT (OUTPATIENT)
Dept: PHYSICAL THERAPY | Facility: MEDICAL CENTER | Age: 39
End: 2019-10-22
Payer: COMMERCIAL

## 2019-10-24 ENCOUNTER — APPOINTMENT (OUTPATIENT)
Dept: PHYSICAL THERAPY | Facility: MEDICAL CENTER | Age: 39
End: 2019-10-24
Payer: COMMERCIAL

## 2019-10-31 DIAGNOSIS — G89.29 CHRONIC NECK PAIN: ICD-10-CM

## 2019-10-31 DIAGNOSIS — M54.12 CERVICAL RADICULOPATHY: ICD-10-CM

## 2019-10-31 DIAGNOSIS — M54.2 CHRONIC NECK PAIN: ICD-10-CM

## 2019-10-31 RX ORDER — DICLOFENAC SODIUM 75 MG/1
75 TABLET, DELAYED RELEASE ORAL 2 TIMES DAILY
Qty: 30 TAB | Refills: 0 | OUTPATIENT
Start: 2019-10-31

## 2019-10-31 RX ORDER — DICLOFENAC SODIUM 75 MG/1
75 TABLET, DELAYED RELEASE ORAL 2 TIMES DAILY
Qty: 30 TAB | Refills: 0 | Status: SHIPPED | OUTPATIENT
Start: 2019-10-31 | End: 2019-12-02 | Stop reason: SDUPTHER

## 2019-10-31 RX ORDER — TRAMADOL HYDROCHLORIDE 50 MG/1
50 TABLET ORAL EVERY 4 HOURS PRN
Qty: 90 TAB | Refills: 0 | OUTPATIENT
Start: 2019-10-31 | End: 2019-11-30

## 2019-10-31 NOTE — LETTER
October 31, 2019        Caridad Nino  [address]  [place] [address]        Dear Caridad:    We have received a request from your pharmacy to refill your prescription(s). After careful review of your chart, we have noted you are due for a follow up appointment.  We request you call our office at [phone] at your earliest convenience and make an appointment.     We look forward to scheduling an appointment for you, so that we may provide you with the safest and most complete medical care.        If you have any questions or concerns, please don't hesitate to call.        Sincerely,        CHIKA Cross.    Electronically Signed

## 2019-12-02 ENCOUNTER — OFFICE VISIT (OUTPATIENT)
Dept: MEDICAL GROUP | Facility: MEDICAL CENTER | Age: 39
End: 2019-12-02
Payer: COMMERCIAL

## 2019-12-02 ENCOUNTER — PATIENT MESSAGE (OUTPATIENT)
Dept: HEALTH INFORMATION MANAGEMENT | Facility: OTHER | Age: 39
End: 2019-12-02

## 2019-12-02 VITALS
HEART RATE: 82 BPM | BODY MASS INDEX: 39.56 KG/M2 | OXYGEN SATURATION: 98 % | SYSTOLIC BLOOD PRESSURE: 118 MMHG | HEIGHT: 68 IN | DIASTOLIC BLOOD PRESSURE: 78 MMHG | WEIGHT: 261 LBS | TEMPERATURE: 97.2 F

## 2019-12-02 DIAGNOSIS — E11.65 UNCONTROLLED TYPE 2 DIABETES MELLITUS WITH HYPERGLYCEMIA (HCC): ICD-10-CM

## 2019-12-02 DIAGNOSIS — G43.909 MIGRAINE SYNDROME: ICD-10-CM

## 2019-12-02 DIAGNOSIS — G89.29 CHRONIC NECK PAIN: ICD-10-CM

## 2019-12-02 DIAGNOSIS — M54.2 CHRONIC NECK PAIN: ICD-10-CM

## 2019-12-02 DIAGNOSIS — Z12.31 ENCOUNTER FOR SCREENING MAMMOGRAM FOR MALIGNANT NEOPLASM OF BREAST: ICD-10-CM

## 2019-12-02 DIAGNOSIS — M54.12 CERVICAL RADICULOPATHY: ICD-10-CM

## 2019-12-02 PROCEDURE — 99214 OFFICE O/P EST MOD 30 MIN: CPT | Performed by: NURSE PRACTITIONER

## 2019-12-02 RX ORDER — GABAPENTIN 100 MG/1
100 CAPSULE ORAL 3 TIMES DAILY
Qty: 90 CAP | Refills: 1 | Status: SHIPPED | OUTPATIENT
Start: 2019-12-02 | End: 2021-05-20

## 2019-12-02 RX ORDER — CYCLOBENZAPRINE HCL 10 MG
10 TABLET ORAL 2 TIMES DAILY PRN
Qty: 60 TAB | Refills: 1 | Status: SHIPPED | OUTPATIENT
Start: 2019-12-02 | End: 2019-12-11 | Stop reason: SDUPTHER

## 2019-12-02 RX ORDER — DICLOFENAC SODIUM 75 MG/1
75 TABLET, DELAYED RELEASE ORAL 2 TIMES DAILY
Qty: 30 TAB | Refills: 0 | Status: SHIPPED | OUTPATIENT
Start: 2019-12-02 | End: 2020-01-17

## 2019-12-02 RX ORDER — SUMATRIPTAN 25 MG/1
25-100 TABLET, FILM COATED ORAL
Qty: 10 TAB | Refills: 0 | Status: SHIPPED | OUTPATIENT
Start: 2019-12-02 | End: 2021-05-20

## 2019-12-02 NOTE — PROGRESS NOTES
Subjective:     Caridad Nino is a 39 y.o. female who presents with migraines.    HPI:   Seen in f/u for migraines.  She is getting more freq migraines.  The pain is severe.  Intolerant of lite and noise.  Over the last 6 mo it is getting worse.  She is on a point system at work and cannot call off.  She has had long time since teenage.  She is h aving pain in frontal area.  Pain is pulsating.  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  She is interested in trying CBD oil to tx.  Will try that and imitrex also  She is stable on meds.  Needs all refilled.   She is due a1c in jan.    She is due eye exam for her DM    Patient Active Problem List    Diagnosis Date Noted   • Vertigo 08/14/2017     Priority: High   • Thrombocytopenia ([facility]) 08/14/2017     Priority: Low   • DM2 (diabetes mellitus, type 2) ([facility]) 08/14/2017     Priority: Low   • Other hemorrhoids 08/22/2019   • Hypertension due to endocrine disorder 08/22/2019   • Dental disorder 08/22/2019   • History of shingles 08/22/2019   • Pneumonia due to infectious organism 08/22/2019   • Snoring 08/22/2019   • Normocytic anemia 08/15/2017   • Morbid obesity due to excess calories ([facility]) 12/03/2016       Current medicines (including changes today)  Current Outpatient Medications   Medication Sig Dispense Refill   • cyclobenzaprine (FLEXERIL) 10 MG Tab Take 1 Tab by mouth 2 times a day as needed. 60 Tab 1   • diclofenac EC (VOLTAREN) 75 MG Tablet Delayed Response Take 1 Tab by mouth 2 times a day. 30 Tab 0   • Empagliflozin (JARDIANCE) 10 MG Tab Take 10 mg by mouth every morning before breakfast. 30 Tab 11   • gabapentin (NEURONTIN) 100 MG Cap Take 1 Cap by mouth 3 times a day. 90 Cap 1   • metFORMIN (GLUCOPHAGE) 500 MG Tab 1 tab po qhs x 1 week, 1 tab po bid x 1 week, 1 tab po q am and 2 tabs po qhs x 1 week, then 2 tabs po bid. Take with food 360 Tab 0   • SUMAtriptan (IMITREX) 25 MG Tab tablet Take 1-4 Tabs by mouth every 24 hours as needed for  "Migraine. 10 Tab 0   • albuterol (VENTOLIN HFA) 108 (90 Base) MCG/ACT Aero Soln inhalation aerosol Inhale 2 Puffs by mouth every 6 hours as needed for Shortness of Breath. 8.5 g 1   • fluticasone (FLONASE) 50 MCG/ACT nasal spray Spray 1 Spray in nose 2 times a day. 1 Bottle 0     No current facility-administered medications for this visit.        Allergies   Allergen Reactions   • Amoxicillin        ROS  Constitutional: Negative. Negative for fever, chills, weight loss, malaise/fatigue and diaphoresis.   HENT: Negative. Negative for hearing loss, ear pain, nosebleeds, congestion, sore throat, neck pain, tinnitus and ear discharge.   Respiratory: Negative. Negative for cough, hemoptysis, sputum production, shortness of breath, wheezing and stridor.   Cardiovascular: Negative. Negative for chest pain, palpitations, orthopnea, claudication, leg swelling and PND.   Gastrointestinal: Denies nausea, vomiting, diarrhea, constipation, heartburn, melena or hematochezia.  Genitourinary: Denies dysuria, hematuria, urinary incontinence, frequency or urgency.        Objective:     /78 (BP Location: Right arm, Patient Position: Sitting)   Pulse 82   Temp 36.2 °C (97.2 °F) (Temporal)   Ht 1.727 m (5' 8\")   Wt 118.4 kg (261 lb)   SpO2 98%  Body mass index is 39.68 kg/m².    Physical Exam:  Vitals reviewed.  Constitutional: Oriented to person, place, and time. appears well-developed and well-nourished. No distress.   Cardiovascular: Normal rate, regular rhythm, normal heart sounds and intact distal pulses. Exam reveals no gallop and no friction rub. No murmur heard. No carotid bruits.   Pulmonary/Chest: Effort normal and breath sounds normal. No stridor. No respiratory distress. no wheezes or rales. exhibits no tenderness.   Musculoskeletal: Normal range of motion. exhibits no edema. stevie pedal pulses 2+.  Lymphadenopathy: No cervical or supraclavicular adenopathy.   Neurological: Alert and oriented to person, place, and " time. exhibits normal muscle tone.  Skin: Skin is warm and dry. No diaphoresis.   Psychiatric: Normal mood and affect. Behavior is normal.      Assessment and Plan:     The following treatment plan was discussed:    1. Migraine syndrome  SUMAtriptan (IMITREX) 25 MG Tab tablet    headaches getting worse.  she wants to try CBD tx.  also ordered imitrex for tx.  f/u 1 mo for sx eval and lab review.     2. Cervical radiculopathy  cyclobenzaprine (FLEXERIL) 10 MG Tab    diclofenac EC (VOLTAREN) 75 MG Tablet Delayed Response    gabapentin (NEURONTIN) 100 MG Cap    refilled meds.     3. Chronic neck pain  cyclobenzaprine (FLEXERIL) 10 MG Tab    refilled meds   4. DM (diabetes mellitus) type II uncontrolled, periph vascular disorder (HCC)  Empagliflozin (JARDIANCE) 10 MG Tab    REFERRAL TO OPHTHALMOLOGY    refer ophth for eye exam.  due for lab in 1 mo.  f/u with both me and cristofer in 1 mo.     5. Uncontrolled type 2 diabetes mellitus with hyperglycemia (HCC)  metFORMIN (GLUCOPHAGE) 500 MG Tab   6. Encounter for screening mammogram for malignant neoplasm of breast  MA-SCREENING MAMMO BILAT W/TOMOSYNTHESIS W/CAD         Followup: Return in about 4 weeks (around 12/30/2019).

## 2019-12-11 DIAGNOSIS — G89.29 CHRONIC NECK PAIN: ICD-10-CM

## 2019-12-11 DIAGNOSIS — M54.2 CHRONIC NECK PAIN: ICD-10-CM

## 2019-12-11 DIAGNOSIS — M54.12 CERVICAL RADICULOPATHY: ICD-10-CM

## 2019-12-11 RX ORDER — CYCLOBENZAPRINE HCL 10 MG
10 TABLET ORAL 2 TIMES DAILY PRN
Qty: 60 TAB | Refills: 1 | Status: SHIPPED | OUTPATIENT
Start: 2019-12-11 | End: 2021-05-20

## 2019-12-27 ENCOUNTER — OFFICE VISIT (OUTPATIENT)
Dept: URGENT CARE | Facility: MEDICAL CENTER | Age: 39
End: 2019-12-27
Payer: COMMERCIAL

## 2019-12-27 VITALS
DIASTOLIC BLOOD PRESSURE: 74 MMHG | TEMPERATURE: 99.5 F | WEIGHT: 263 LBS | OXYGEN SATURATION: 96 % | SYSTOLIC BLOOD PRESSURE: 120 MMHG | BODY MASS INDEX: 39.99 KG/M2 | RESPIRATION RATE: 18 BRPM | HEART RATE: 102 BPM

## 2019-12-27 DIAGNOSIS — J01.00 ACUTE MAXILLARY SINUSITIS, RECURRENCE NOT SPECIFIED: ICD-10-CM

## 2019-12-27 PROCEDURE — 99214 OFFICE O/P EST MOD 30 MIN: CPT | Performed by: NURSE PRACTITIONER

## 2019-12-27 RX ORDER — DOXYCYCLINE HYCLATE 100 MG
100 TABLET ORAL 2 TIMES DAILY
Qty: 14 TAB | Refills: 0 | Status: SHIPPED | OUTPATIENT
Start: 2019-12-27 | End: 2020-01-03

## 2019-12-27 ASSESSMENT — ENCOUNTER SYMPTOMS
SINUS PAIN: 1
SWOLLEN GLANDS: 1
CHILLS: 0
TROUBLE SWALLOWING: 1
SORE THROAT: 1
FEVER: 0
COUGH: 1

## 2019-12-27 NOTE — PROGRESS NOTES
Subjective:      Caridad Nino is a 39 y.o. female who presents with Pharyngitis (x1 day , green mucus, )    Past Medical History:   Diagnosis Date   • Dental disorder    • Hemorrhoids 1998   • History of shingles June/July 2016   • Hypertension 1997    JUST DURING PREGNANCY   • Morbid obesity due to excess calories (HCC) 12/3/2016   • Normocytic anemia 8/15/2017   • Pneumonia    • Snoring    • Thrombocytopenia (HCC) 8/14/2017   • Thyroid nodule    • Type 2 diabetes mellitus without complication, without long-term current use of insulin (HCC)    • Vertigo 8/14/2017     Social History     Socioeconomic History   • Marital status:      Spouse name: Not on file   • Number of children: Not on file   • Years of education: Not on file   • Highest education level: Not on file   Occupational History   • Not on file   Social Needs   • Financial resource strain: Not on file   • Food insecurity:     Worry: Not on file     Inability: Not on file   • Transportation needs:     Medical: Not on file     Non-medical: Not on file   Tobacco Use   • Smoking status: Former Smoker     Packs/day: 1.00     Years: 27.00     Pack years: 27.00     Types: Cigarettes     Start date: 1/1/1989     Last attempt to quit: 4/5/2016     Years since quitting: 3.7   • Smokeless tobacco: Never Used   Substance and Sexual Activity   • Alcohol use: Yes     Frequency: Monthly or less   • Drug use: No   • Sexual activity: Yes     Partners: Male   Lifestyle   • Physical activity:     Days per week: Not on file     Minutes per session: Not on file   • Stress: Not on file   Relationships   • Social connections:     Talks on phone: Not on file     Gets together: Not on file     Attends Spiritism service: Not on file     Active member of club or organization: Not on file     Attends meetings of clubs or organizations: Not on file     Relationship status: Not on file   • Intimate partner violence:     Fear of current or ex partner: Not on file      Emotionally abused: Not on file     Physically abused: Not on file     Forced sexual activity: Not on file   Other Topics Concern   • Not on file   Social History Narrative   • Not on file     Family History   Problem Relation Age of Onset   • Diabetes Mother    • Other Mother         MS   • Cancer Mother         colon   • Cancer Sister         throat   • Alcohol abuse Paternal Grandmother        Allergies: Amoxicillin    Patient is a 39-year-old female who presents today with complaint of sinus pain and pressure with thick green foul-smelling purulent drainage.  Symptoms started over the last week.  She denies any fever, aches, or chills.  Denies any chest congestion or shortness of breath.          Pharyngitis    This is a new problem. The current episode started in the past 7 days. The problem has been unchanged. Neither side of throat is experiencing more pain than the other. There has been no fever. Associated symptoms include congestion, coughing, swollen glands and trouble swallowing. She has tried nothing for the symptoms. The treatment provided no relief.       Review of Systems   Constitutional: Positive for malaise/fatigue. Negative for chills and fever.   HENT: Positive for congestion, sinus pain, sore throat and trouble swallowing.    Respiratory: Positive for cough.    All other systems reviewed and are negative.         Objective:     /74   Pulse (!) 102   Temp 37.5 °C (99.5 °F) (Temporal)   Resp 18   Wt 119.3 kg (263 lb)   SpO2 96%   BMI 39.99 kg/m²      Physical Exam  Vitals signs reviewed.   Constitutional:       Appearance: Normal appearance.   HENT:      Head: Normocephalic and atraumatic.      Right Ear: Tympanic membrane, ear canal and external ear normal.      Left Ear: Tympanic membrane, ear canal and external ear normal.      Nose: Congestion and rhinorrhea present.      Comments: Tender over the maxillary sinuses  Thick yellow and green postnasal drainage     Mouth/Throat:       Mouth: Mucous membranes are moist.   Eyes:      Extraocular Movements: Extraocular movements intact.      Conjunctiva/sclera: Conjunctivae normal.      Pupils: Pupils are equal, round, and reactive to light.   Neck:      Musculoskeletal: Normal range of motion and neck supple.   Cardiovascular:      Rate and Rhythm: Normal rate and regular rhythm.      Heart sounds: Normal heart sounds.   Pulmonary:      Effort: Pulmonary effort is normal.      Breath sounds: Normal breath sounds.   Musculoskeletal: Normal range of motion.   Skin:     General: Skin is warm and dry.   Neurological:      Mental Status: She is alert and oriented to person, place, and time.   Psychiatric:         Mood and Affect: Mood normal.         Behavior: Behavior normal.         Thought Content: Thought content normal.         Judgment: Judgment normal.                 Assessment/Plan:   Acute maxillary sinusitis    Flonase  Humidifier  Doxycycline  Push fluids  Follow-up for persistent or worsening of symptoms    There are no diagnoses linked to this encounter.

## 2020-01-17 DIAGNOSIS — M54.12 CERVICAL RADICULOPATHY: ICD-10-CM

## 2020-01-17 RX ORDER — DICLOFENAC SODIUM 75 MG/1
TABLET, DELAYED RELEASE ORAL
Qty: 30 TAB | Refills: 0 | Status: SHIPPED | OUTPATIENT
Start: 2020-01-17 | End: 2021-05-20

## 2020-09-13 ENCOUNTER — TELEPHONE (OUTPATIENT)
Dept: MEDICAL GROUP | Facility: MEDICAL CENTER | Age: 40
End: 2020-09-13

## 2020-09-13 DIAGNOSIS — E04.1 THYROID NODULE: ICD-10-CM

## 2020-09-14 NOTE — TELEPHONE ENCOUNTER
Please let pt know that its time to recheck her neck us for the thyroid nodules. i have placed the order.

## 2020-10-23 ENCOUNTER — TELEPHONE (OUTPATIENT)
Dept: MEDICAL GROUP | Facility: MEDICAL CENTER | Age: 40
End: 2020-10-23

## 2020-10-23 DIAGNOSIS — Z13.21 ENCOUNTER FOR VITAMIN DEFICIENCY SCREENING: ICD-10-CM

## 2020-10-23 DIAGNOSIS — D69.6 THROMBOCYTOPENIA (HCC): ICD-10-CM

## 2020-10-23 DIAGNOSIS — I15.0 RENOVASCULAR HYPERTENSION: ICD-10-CM

## 2020-10-23 DIAGNOSIS — E04.1 THYROID NODULE: ICD-10-CM

## 2020-10-23 DIAGNOSIS — E11.00 TYPE 2 DIABETES MELLITUS WITH HYPEROSMOLARITY WITHOUT COMA, WITHOUT LONG-TERM CURRENT USE OF INSULIN (HCC): ICD-10-CM

## 2020-10-23 DIAGNOSIS — I15.2 HYPERTENSION DUE TO ENDOCRINE DISORDER: ICD-10-CM

## 2020-10-23 DIAGNOSIS — D64.9 NORMOCYTIC ANEMIA: ICD-10-CM

## 2020-10-24 NOTE — TELEPHONE ENCOUNTER
Please check with pt.  She has neck us ordered in sept and hasn't done it.  She is also overdue for lab and appt.

## 2020-10-27 NOTE — TELEPHONE ENCOUNTER
Pt states she has not yet. She was out of work and just went back to work. Pt states she is calling to make apt.  Pt will make fv also

## 2021-05-20 ENCOUNTER — OCCUPATIONAL MEDICINE (OUTPATIENT)
Dept: URGENT CARE | Facility: CLINIC | Age: 41
End: 2021-05-20
Payer: COMMERCIAL

## 2021-05-20 VITALS
OXYGEN SATURATION: 95 % | SYSTOLIC BLOOD PRESSURE: 136 MMHG | DIASTOLIC BLOOD PRESSURE: 88 MMHG | WEIGHT: 260 LBS | TEMPERATURE: 97.3 F | HEIGHT: 67 IN | HEART RATE: 74 BPM | RESPIRATION RATE: 16 BRPM | BODY MASS INDEX: 40.81 KG/M2

## 2021-05-20 DIAGNOSIS — T50.Z95A PAIN AT SITE OF VACCINATION: ICD-10-CM

## 2021-05-20 DIAGNOSIS — M79.10 MUSCLE PAIN: ICD-10-CM

## 2021-05-20 DIAGNOSIS — M79.621 PAIN OF RIGHT UPPER ARM: ICD-10-CM

## 2021-05-20 DIAGNOSIS — R52 PAIN AT SITE OF VACCINATION: ICD-10-CM

## 2021-05-20 PROCEDURE — 99214 OFFICE O/P EST MOD 30 MIN: CPT | Performed by: NURSE PRACTITIONER

## 2021-05-20 RX ORDER — PREDNISONE 20 MG/1
TABLET ORAL
COMMUNITY
Start: 2017-04-20 | End: 2021-05-20

## 2021-05-20 ASSESSMENT — FIBROSIS 4 INDEX: FIB4 SCORE: 1.46

## 2021-05-20 NOTE — LETTER
"EMPLOYEE’S CLAIM FOR COMPENSATION/ REPORT OF INITIAL TREATMENT  FORM C-4    EMPLOYEE’S CLAIM - PROVIDE ALL INFORMATION REQUESTED   First Name  Caridad Last Name  Mica Birthdate                    1980                Sex  female Claim Number   Home Address  [address] Age  40 y.o. 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  Zip  [address]    Date of Injury  5/20/2021               Hour of Injury  7:30 AM Date Employer Notified  5/20/2021 Last Day of Work after Injury     or Occupational Disease  5/20/2021 Supervisor to Whom Injury     Reported  Jhonny Alcazar   Address or Location of Accident (if applicable)  [[address]]   What were you doing at the time of accident? (if applicable)  Hopping Clothes    How did this injury or occupational disease occur? (Be specific an answer in detail. Use additional sheet if necessary)  Has the 1st covid19 shot on 5/18/2021 around 10:30 AM went to work for the [employer] 8 hrs each day. I have to apply pressure on arm to do work.    If you believe that you have an occupational disease, when did you first have knowledge of the disability and it relationship to your employment?  N/A Witnesses to the Accident  N/A      Nature of Injury or Occupational Disease  Strain  Part(s) of Body Injured or Affected  Upper Arm (R), N/A, N/A    I certify that the above is true and correct to the best of my " knowledge and that I have provided this information in order to obtain the benefits of [place]).  I hereby authorize any physician, chiropractor, surgeon, practitioner, or other person, any hospital, including [facility] or [facility], any medical service organization, any insurance company, or other institution or organization to release to each other, any medical or other information, including benefits paid or payable, pertinent to this injury or disease, except information relative to diagnosis, treatment and/or counseling for AIDS, psychological conditions, alcohol or controlled substances, for which I must give specific authorization.  A Photostat of this authorization shall be as valid as the original.     Date   Place   Employee’s Signature   THIS REPORT MUST BE COMPLETED AND MAILED WITHIN 3 WORKING DAYS OF TREATMENT   Place  [facility]  Name of Facility  [facility]   Date  5/20/2021 Diagnosis  (R52,  T50.Z95A) Pain at site of vaccination  (M79.621) Pain of right upper arm Is there evidence the injured employee was under the              influence of alcohol and/or another controlled substance at the time of accident?   Hour  5:41 PM Description of Injury or Disease  Diagnoses of Pain at site of vaccination and Pain of right upper arm were pertinent to this visit. No   Treatment  OTC analgesic of choice, warm / cold packs prn pain  Have you advised the patient to remain off work five days or     more? No   X-Ray Findings      If Yes   From Date  To Date      From information given by the employee, together with medical evidence, can you directly connect this injury or occupational disease as job incurred?  No  Comments:I believe her symptoms are related to her Covid vaccination and were possibly exacerbated by her job duties If No Full Duty    No Modified Duty  Yes   Is  "additional medical care by a physician indicated?  Yes If Modified Duty, Specify any Limitations / Restrictions  See [place] D39    Do you know of any previous injury or disease contributing to this condition or occupational disease?                            No   Date  5/20/2021 Print Doctor’s Name   ALEXI Lobo I certify the employer’s copy of  this form was mailed on:   Address  [address] Insurer’s Use Only     [facility] Zip  [address]    Provider’s Tax ID Number  [id number] Telephone  Dept: [phone]         Signature:     Degree          ORIGINAL-TREATING PHYSICIAN OR CHIROPRACTOR    PAGE 2-INSURER/TPA    PAGE 3-EMPLOYER    PAGE 4-EMPLOYEE        Form C-4 (rev.10/07)           BRIEF DESCRIPTION OF RIGHTS AND BENEFITS  (Pursuant to [place] 616C.050)    Notice of Injury or Occupational Disease (Incident Report Form C-1): If an injury or occupational disease (OD) arises out of and in the course of employment, you must provide written notice to your employer as soon as practicable, but no later than 7 days after the accident or OD. Your employer shall maintain a sufficient supply of the required forms.    Claim for Compensation (Form C-4): If medical treatment is sought, the form C-4 is available at the place of initial treatment. A completed \"Claim for Compensation\" (Form C-4) must be filed within 90 days after an accident or OD. The treating physician or chiropractor must, within 3 working days after treatment, complete and mail to the employer, the employer's insurer and third-party , the Claim for Compensation.    Medical Treatment: If you require medical treatment for your on-the-job injury or OD, you may be required to select a physician or chiropractor from a list provided by your workers’ compensation insurer, if it has contracted with an Organization for Managed Care (MCO) or Preferred Provider Organization (PPO) or providers of health " care. If your employer has not entered into a contract with an MCO or PPO, you may select a physician or chiropractor from the Panel of Physicians and Chiropractors. Any medical costs related to your industrial injury or OD will be paid by your insurer.    Temporary Total Disability (TTD): If your doctor has certified that you are unable to work for a period of at least 5 consecutive days, or 5 cumulative days in a 20-day period, or places restrictions on you that your employer does not accommodate, you may be entitled to TTD compensation.    Temporary Partial Disability (TPD): If the wage you receive upon reemployment is less than the compensation for TTD to which you are entitled, the insurer may be required to pay you TPD compensation to make up the difference. TPD can only be paid for a maximum of 24 months.    Permanent Partial Disability (PPD): When your medical condition is stable and there is an indication of a PPD as a result of your injury or OD, within 30 days, your insurer must arrange for an evaluation by a rating physician or chiropractor to determine the degree of your PPD. The amount of your PPD award depends on the date of injury, the results of the PPD evaluation, your age and wage.    Permanent Total Disability (PTD): If you are medically certified by a treating physician or chiropractor as permanently and totally disabled and have been granted a PTD status by your insurer, you are entitled to receive monthly benefits not to exceed 66 2/3% of your average monthly wage. The amount of your PTD payments is subject to reduction if you previously received a lump-sum PPD award.    Vocational Rehabilitation Services: You may be eligible for vocational rehabilitation services if you are unable to return to the job due to a permanent physical impairment or permanent restrictions as a result of your injury or occupational disease.    Transportation and Per Sherly Reimbursement: You may be eligible for travel  expenses and per gayle associated with medical treatment.    Reopening: You may be able to reopen your claim if your condition worsens after claim closure.     Appeal Process: If you disagree with a written determination issued by the insurer or the insurer does not respond to your request, you may appeal to the Department of Administration, , by following the instructions contained in your determination letter. You must appeal the determination within 70 days from the date of the determination letter at [address], [address], [place], [place] [address], or [address], [place], [place] [address]. If you disagree with the  decision, you may appeal to the Department of Administration, . You must file your appeal within 30 days from the date of the  decision letter at [address], [address], [place], [place] [address], or [address], [address], [place], [place] [address]. If you disagree with a decision of an , you may file a petition for judicial review with the District Court. You must do so within 30 days of the Appeal Officer’s decision. You may be represented by an  at your own expense or you may contact the [facility] for possible representation.    [place]  for Injured Workers (NAIW): If you disagree with a  decision, you may request that NAIW represent you without charge at an  Hearing. For information regarding denial of benefits, you may contact the [facility] at: [address], [address], [place] [address], [phone]) [phone], or [address], [address], [place] [address], [phone]    To File a Complaint with the Division: If you wish to file a complaint with the  of the Division of Industrial Relations (DIR),  please contact the Workers’ Compensation Section, [address], [address], [place], [place] [address],  telephone (005) [phone], or [address], [address], [place], [place] [address], telephone [phone].    For assistance with Workers’ Compensation Issues: You may contact the [facility] Office for Consumer Health Assistance, [address], [address], [place] [address], Toll Free 1-882.991.2278, Web site: http://[facility].nv.gov/Programs/ISABEL E-mail: [email or web address]              __________________________________________________________________                                    _________________            Employee Name / Signature                                                                                                                            Date                                                                                                                                                                                                                              D-2 (rev. 10/20)

## 2021-05-20 NOTE — LETTER
[facility] [address] [place] [address]  Phone:  [phone] - Fax:  [phone]   [facility] Progress Report and Disability Certification  Date of Service: 5/20/2021   No Show:  No  Date / Time of Next Visit: 5/27/2021   Claim Information   Patient Name: Caridad Nino  Claim Number:     Employer: [employer]  Date of Injury: 5/20/2021     Insurer / TPA: [employer]  ID / SSN:     Occupation: Opener/   Diagnosis: Diagnoses of Pain at site of vaccination and Pain of right upper arm were pertinent to this visit.    Medical Information   Related to Industrial Injury? No    Subjective Complaints:  She received her 1st voluntary COVID 19 vaccination on 05/18/2021.  She felt healthy and well prior to getting the vaccination without any pain in her arm. 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 She believes that she cannot apply appropriate pressure to hoop garments- due to pain in her arm. Pain 8 1/2/10. Pain is in upper right arm. Throbbing sensation. It was initially red and hot. The redness and heat have gone away.   Treatment tried: ibuprofen, aleve, heating pad, ice pack - minimal improvement.   No other employers      Objective Findings: Physical Exam  Constitutional:       General: She is not in acute distress.     Appearance: Normal appearance. She is normal weight. She is not ill-appearing.   Cardiovascular:      Rate and Rhythm: Normal rate.      Pulses: Normal pulses.   Pulmonary:      Effort: Pulmonary effort is normal.   Musculoskeletal:      Right upper arm: Tenderness present. No swelling or edema.        Arms:  Skin:     General: Skin is warm.  1cm ecchymosis of right upper arm      Capillary Refill: Capillary refill takes less than 2 seconds.      Findings: Bruising (right upper arm deltoid area) present. No erythema.   Neurological:      Mental Status: She is alert and  oriented to person, place, and time.   Psychiatric:         Mood and Affect: Mood normal.         Behavior: Behavior normal.         Thought Content: Thought content normal.        Pre-Existing Condition(s):     Assessment:   Initial Visit    Status: Additional Care Required  Permanent Disability:No    Plan: Medication  Comments:OTC analgesic of choice, warm/ cold packs prn/ work restrictions for 1 week     Diagnostics:      Comments:       Disability Information   Status: Released to Restricted Duty    From:  2021  Through: 2021 Restrictions are: Temporary   Physical Restrictions   Sitting:    Standing:    Stooping:    Bending:      Squatting:    Walking:    Climbin hrs/day Pushing:  < or = to 2 hrs/day  Comments:right arm restriction    Pulling:  < or = to 2 hrs/day  Comments:right arm restriction Other:    Reaching Above Shoulder (L):   Reaching Above Shoulder (R): < or = 4 hrs/day     Reaching Below Shoulder (L):    Reaching Below Shoulder (R):      Not to exceed Weight Limits   Carrying(hrs):   Weight Limit(lb):   Lifting(hrs):   Weight  Limit(lb):     Comments:      Repetitive Actions   Hands: i.e. Fine Manipulations from Grasping:     Feet: i.e. Operating Foot Controls:     Driving / Operate Machinery:     Provider Name:   Cassie Mancilla AEdytaPROSALIE Physician Signature:  Physician Name:     Clinic Name / Location: [address]  [place] [address] Clinic Phone Number: Dept: [phone]   Appointment Time: 5:30 Pm Visit Start Time: 5:41 PM   Check-In Time:  5:30 Pm Visit Discharge Time:     Original-Treating Physician or Chiropractor    Page 2-Insurer/TPA    Page 3-Employer    Page 4-Employee

## 2021-05-20 NOTE — LETTER
[facility] [address] - [place] [address]  Phone:  [phone] - Fax:  [phone]   [facility] Progress Report and Disability Certification  Date of Service: 2021   No Show:  No  Date / Time of Next Visit: 2021   Claim Information   Patient Name: Caridad Nino  Claim Number:     Employer: [employer]  Date of Injury: 2021     Insurer / TPA: [employer]  ID / SSN:     Occupation: Opener/   Diagnosis: Diagnoses of Pain at site of vaccination and Pain of right upper arm were pertinent to this visit.    Medical Information   Related to Industrial Injury? No    Subjective Complaints:  She received her 1st voluntary COVID 19 vaccination on 2021.  She felt healthy and well prior to getting the vaccination without any pain in her arm. 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 She believes that she cannot apply appropriate pressure to hoop garments- due to pain in her arm. Pain 8 1/2/10. Pain is in upper right arm. Throbbing sensation. It was initially red and hot. The redness and heat have gone away.   Treatment tried: ibuprofen, aleve, heating pad, ice pack - minimal improvement.   No other employers      Objective Findings:     Pre-Existing Condition(s):     Assessment:   Initial Visit    Status: Additional Care Required  Permanent Disability:No    Plan: Medication  Comments:OTC analgesic of choice, warm/ cold packs prn/ work restrictions for 1 week     Diagnostics:      Comments:       Disability Information   Status: Released to Restricted Duty    From:  2021  Through: 2021 Restrictions are: Temporary   Physical Restrictions   Sitting:    Standing:    Stooping:    Bending:      Squatting:    Walking:    Climbin hrs/day Pushing:  < or = to 2 hrs/day  Comments:right arm restriction    Pulling:  < or = to 2 hrs/day  Comments:right arm restriction  Other:    Reaching Above Shoulder (L):   Reaching Above Shoulder (R): < or = 4 hrs/day     Reaching Below Shoulder (L):    Reaching Below Shoulder (R):      Not to exceed Weight Limits   Carrying(hrs):   Weight Limit(lb):   Lifting(hrs):   Weight  Limit(lb):     Comments:      Repetitive Actions   Hands: i.e. Fine Manipulations from Grasping:     Feet: i.e. Operating Foot Controls:     Driving / Operate Machinery:     Provider Name:   Cassie Mancilla A.P.NEdyta Physician Signature:  Physician Name:     Clinic Name / Location: [address]  [place] [address] Clinic Phone Number: Dept: [phone]   Appointment Time: 5:30 Pm Visit Start Time: 5:41 PM   Check-In Time:  5:30 Pm Visit Discharge Time:  6:15 PM   Original-Treating Physician or Chiropractor    Page 2-Insurer/TPA    Page 3-Employer    Page 4-Employee

## 2021-05-21 NOTE — PROGRESS NOTES
Caridad Nino is a 40 y.o. female who presents for Arm Pain (w/c new DOI: 05/20/2021 (R) arm feels swelling )      HPI She received her 1st voluntary COVID 19 vaccination on 05/18/2021.  She felt healthy and well prior to getting the vaccination without any pain in her arm. 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 She believes that she cannot apply appropriate pressure to hoop garments- due to pain in her arm. Pain 8 1/2/10. Pain is in upper right arm. Throbbing sensation. It was initially red and hot. The redness and heat have gone away.   Treatment tried: ibuprofen, aleve, heating pad, ice pack - minimal improvement.   No other employers       Review of Systems   Constitutional: Negative for chills, fever and malaise/fatigue.   Gastrointestinal: Negative for nausea and vomiting.   Musculoskeletal: Positive for joint pain.   Neurological: Negative for tingling and sensory change.       Allergies   Allergen Reactions   • Amoxicillin      Past Medical History:   Diagnosis Date   • Dental disorder    • Hemorrhoids 1998   • History of shingles June/July 2016   • Hypertension 1997    JUST DURING PREGNANCY   • Morbid obesity due to excess calories ([facility]) 12/3/2016   • Normocytic anemia 8/15/2017   • Pneumonia    • Snoring    • Thrombocytopenia ([facility]) 8/14/2017   • Thyroid nodule    • Type 2 diabetes mellitus without complication, without long-term current use of insulin ([facility])    • Vertigo 8/14/2017     Past Surgical History:   Procedure Laterality Date   • CHOLECYSTECTOMY  1998     Family History   Problem Relation Age of Onset   • Diabetes Mother    • Other Mother         MS   • Cancer Mother         colon   • Cancer Sister         throat   • Alcohol abuse Paternal Grandmother      Social History     Tobacco Use   • Smoking status: Former Smoker     Packs/day: 1.00     Years: 27.00     Pack years: 27.00     Types: Cigarettes     Start date: 1/1/1989     Quit date: 4/5/2016     Years since  "quittin.1   • Smokeless tobacco: Never Used   Substance Use Topics   • Alcohol use: Yes     Comment: rarely      Patient Active Problem List   Diagnosis   • Morbid obesity due to excess calories ([facility])   • Vertigo   • Thrombocytopenia ([facility])   • DM2 (diabetes mellitus, type 2) ([facility])   • Normocytic anemia   • Other hemorrhoids   • Hypertension due to endocrine disorder   • Dental disorder   • History of shingles   • Pneumonia due to infectious organism   • Snoring     No current outpatient medications on file prior to visit.     No current facility-administered medications on file prior to visit.          Objective:     /88   Pulse 74   Temp 36.3 °C (97.3 °F) (Temporal)   Resp 16   Ht 1.702 m (5' 7\")   Wt 118 kg (260 lb)   SpO2 95%   BMI 40.72 kg/m²     Physical Exam  Constitutional:       General: She is not in acute distress.     Appearance: Normal appearance. She is normal weight. She is not ill-appearing.   Cardiovascular:      Rate and Rhythm: Normal rate.      Pulses: Normal pulses.   Pulmonary:      Effort: Pulmonary effort is normal.   Musculoskeletal:      Right upper arm: Tenderness present. No swelling or edema.        Arms:    Skin:     General: Skin is warm.      Capillary Refill: Capillary refill takes less than 2 seconds.      Findings: Bruising (right upper arm deltoid area) present. No erythema.   Neurological:      Mental Status: She is alert and oriented to person, place, and time.   Psychiatric:         Mood and Affect: Mood normal.         Behavior: Behavior normal.         Thought Content: Thought content normal.         Assessment /Associated Orders:      1. Pain at site of vaccination     2. Pain of right upper arm     3. Muscle pain           Medical Decision Making:    Pt is clinically stable at today's acute urgent care visit.  No acute distress noted. Appropriate for outpatient management at this time.   See [place] D39 and C4 forms for additional documentation.     I personally " reviewed prior external notes and test results pertinent to today's visit.  I have independently reviewed and interpreted all diagnostics ordered during this urgent care acute visit.   Time spent evaluating this patient was at least 30 minutes and includes preparing for visit, counseling/education, exam and evaluation, obtaining history, independent interpretation, ordering lab/test/procedures,medication management and documentation.   1.64

## 2021-05-22 PROBLEM — M77.8 TENDINITIS OF WRIST: Status: ACTIVE | Noted: 2017-02-06

## 2021-05-22 ASSESSMENT — ENCOUNTER SYMPTOMS
CHILLS: 0
NAUSEA: 0
SENSORY CHANGE: 0
VOMITING: 0
FEVER: 0
TINGLING: 0

## 2021-05-27 ENCOUNTER — OCCUPATIONAL MEDICINE (OUTPATIENT)
Dept: URGENT CARE | Facility: CLINIC | Age: 41
End: 2021-05-27
Payer: COMMERCIAL

## 2021-05-27 VITALS
TEMPERATURE: 97.9 F | BODY MASS INDEX: 40.81 KG/M2 | HEIGHT: 67 IN | DIASTOLIC BLOOD PRESSURE: 78 MMHG | RESPIRATION RATE: 16 BRPM | OXYGEN SATURATION: 95 % | HEART RATE: 85 BPM | WEIGHT: 260 LBS | SYSTOLIC BLOOD PRESSURE: 128 MMHG

## 2021-05-27 DIAGNOSIS — M79.10 MUSCLE PAIN: ICD-10-CM

## 2021-05-27 DIAGNOSIS — T50.Z95A PAIN AT SITE OF VACCINATION: ICD-10-CM

## 2021-05-27 DIAGNOSIS — M79.621 PAIN OF RIGHT UPPER ARM: ICD-10-CM

## 2021-05-27 DIAGNOSIS — R52 PAIN AT SITE OF VACCINATION: ICD-10-CM

## 2021-05-27 PROCEDURE — 99213 OFFICE O/P EST LOW 20 MIN: CPT | Performed by: PHYSICIAN ASSISTANT

## 2021-05-27 ASSESSMENT — FIBROSIS 4 INDEX: FIB4 SCORE: 1.46

## 2021-05-27 NOTE — LETTER
[facility] [address] - [place] [address]  Phone:  [phone] - Fax:  [phone]   [facility] Progress Report and Disability Certification  Date of Service: 5/27/2021   No Show:  No  Date / Time of Next Visit:     Claim Information   Patient Name: Caridad Nino  Claim Number:     Employer: [employer]  Date of Injury: 5/20/2021     Insurer / TPA: [employer]  ID / SSN:     Occupation: Opener/   Diagnosis: Diagnoses of Pain at site of vaccination, Pain of right upper arm, and Muscle pain were pertinent to this visit.    Medical Information   Related to Industrial Injury? No    Subjective Complaints:  Copied from initial visit - She received her 1st voluntary COVID 19 vaccination on 05/18/2021.  She felt healthy and well prior to getting the vaccination without any pain in her arm. 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 She believes that she cannot apply appropriate pressure to hoop garments- due to pain in her arm. Pain 8 1/2/10. Pain is in upper right arm. Throbbing sensation. It was initially red and hot. The redness and heat have gone away.   Treatment tried: ibuprofen, aleve, heating pad, ice pack - minimal improvement.   No other employers        F/u 5/27/21: Pt pain has resolved. ROM and strength has improved. Bruising improved. No redness or swelling.    Objective Findings: Physical Exam  Vitals reviewed.   Constitutional:       General: She is not in acute distress.     Appearance: She is well-developed.   HENT:      Head: Normocephalic and atraumatic.   Neck:      Trachea: No tracheal deviation.   Pulmonary:      Effort: Pulmonary effort is normal.   Musculoskeletal:      Comments: R arm: ROM strength tone intact. Distal nv intact. No bruising or erythema.    Skin:     General: Skin is warm and dry.      Capillary Refill: Capillary refill takes less than 2 seconds.    Neurological:      Mental Status: She is alert and oriented to person, place, and time.   Psychiatric:         Mood and Affect: Mood normal.         Behavior: Behavior normal.        Pre-Existing Condition(s):     Assessment:   Condition Improved    Status: Discharged /  MMI  Permanent Disability:No    Plan:      Diagnostics:      Comments:       Disability Information   Status: Released to Full Duty    From:  5/27/2021  Through:   Restrictions are:     Physical Restrictions   Sitting:    Standing:    Stooping:    Bending:      Squatting:    Walking:    Climbing:    Pushing:      Pulling:    Other:    Reaching Above Shoulder (L):   Reaching Above Shoulder (R):       Reaching Below Shoulder (L):    Reaching Below Shoulder (R):      Not to exceed Weight Limits   Carrying(hrs):   Weight Limit(lb):   Lifting(hrs):   Weight  Limit(lb):     Comments: Released to MMI     Repetitive Actions   Hands: i.e. Fine Manipulations from Grasping:     Feet: i.e. Operating Foot Controls:     Driving / Operate Machinery:     Provider Name:   Mary Lou Gonzalez P.A.-C. Physician Signature:  Physician Name:     Clinic Name / Location: [address]  [place] [address] Clinic Phone Number: Dept: [phone]   Appointment Time: 11:00 Am Visit Start Time: 11:20 AM   Check-In Time:  11:06 Am Visit Discharge Time:  1150   Original-Treating Physician or Chiropractor    Page 2-Insurer/TPA    Page 3-Employer    Page 4-Employee

## 2021-05-27 NOTE — PROGRESS NOTES
"Subjective:   Caridad Nino is a 40 y.o. female who presents for Follow-Up (W/C F/U from 5/20, pain at site of vaccination, entirely resolved)    Copied from initial visit - She received her 1st voluntary COVID 19 vaccination on 05/18/2021.  She felt healthy and well prior to getting the vaccination without any pain in her arm. 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 She believes that she cannot apply appropriate pressure to hoop garments- due to pain in her arm. Pain 8 1/2/10. Pain is in upper right arm. Throbbing sensation. It was initially red and hot. The redness and heat have gone away.   Treatment tried: ibuprofen, aleve, heating pad, ice pack - minimal improvement.   No other employers        F/u 5/27/21: Pt pain has resolved. ROM and strength has improved. Bruising improved. No redness or swelling.    HPI  ROS    PMH: No pertinent past medical history to this problem  MEDS: Medications were reviewed in Epic  ALLERGIES: Allergies were reviewed in Epic  SOCH[employer]: Works as   FH: No pertinent family history to this problem       Objective:   /78 (BP Location: Left arm, Patient Position: Sitting, BP Cuff Size: Adult)   Pulse 85   Temp 36.6 °C (97.9 °F) (Temporal)   Resp 16   Ht 1.702 m (5' 7\")   Wt 118 kg (260 lb)   SpO2 95%   BMI 40.72 kg/m²     Physical Exam  Vitals reviewed.   Constitutional:       General: She is not in acute distress.     Appearance: She is well-developed.   HENT:      Head: Normocephalic and atraumatic.   Neck:      Trachea: No tracheal deviation.   Pulmonary:      Effort: Pulmonary effort is normal.   Musculoskeletal:      Comments: R arm: ROM strength tone intact. Distal nv intact. No bruising or erythema.    Skin:     General: Skin is warm and dry.      Capillary Refill: Capillary refill takes less than 2 seconds.   Neurological:      Mental Status: She is alert and oriented to person, place, and time.   Psychiatric:         Mood and Affect: " Mood normal.         Behavior: Behavior normal.          Assessment/Plan:     1. Pain at site of vaccination     2. Pain of right upper arm     3. Muscle pain       Pt pain resolved. Released to I.    Patient confirmed understanding of information.    Please note that this dictation was created using voice recognition software. I have made every reasonable attempt to correct obvious errors, but I expect that there are errors of grammar and possibly content that I did not discover before finalizing the note.

## 2022-01-13 ENCOUNTER — APPOINTMENT (OUTPATIENT)
Dept: MEDICAL GROUP | Facility: MEDICAL CENTER | Age: 42
End: 2022-01-13
Payer: COMMERCIAL

## 2022-01-18 ENCOUNTER — OFFICE VISIT (OUTPATIENT)
Dept: MEDICAL GROUP | Facility: MEDICAL CENTER | Age: 42
End: 2022-01-18
Payer: COMMERCIAL

## 2022-01-18 VITALS
TEMPERATURE: 97.3 F | SYSTOLIC BLOOD PRESSURE: 124 MMHG | WEIGHT: 253 LBS | OXYGEN SATURATION: 98 % | HEART RATE: 85 BPM | DIASTOLIC BLOOD PRESSURE: 76 MMHG | BODY MASS INDEX: 39.71 KG/M2 | HEIGHT: 67 IN

## 2022-01-18 DIAGNOSIS — E11.00 TYPE 2 DIABETES MELLITUS WITH HYPEROSMOLARITY WITHOUT COMA, WITHOUT LONG-TERM CURRENT USE OF INSULIN (HCC): ICD-10-CM

## 2022-01-18 DIAGNOSIS — M54.12 CERVICAL RADICULOPATHY: ICD-10-CM

## 2022-01-18 DIAGNOSIS — L30.9 DERMATITIS: ICD-10-CM

## 2022-01-18 LAB
HBA1C MFR BLD: 8.7 % (ref 0–5.6)
INT CON NEG: NEGATIVE
INT CON POS: POSITIVE

## 2022-01-18 PROCEDURE — 83036 HEMOGLOBIN GLYCOSYLATED A1C: CPT | Performed by: PHYSICIAN ASSISTANT

## 2022-01-18 PROCEDURE — 99214 OFFICE O/P EST MOD 30 MIN: CPT | Performed by: PHYSICIAN ASSISTANT

## 2022-01-18 RX ORDER — HYDROXYZINE HYDROCHLORIDE 25 MG/1
25 TABLET, FILM COATED ORAL 3 TIMES DAILY PRN
Qty: 30 TABLET | Refills: 0 | Status: SHIPPED
Start: 2022-01-18 | End: 2022-02-22

## 2022-01-18 RX ORDER — METFORMIN HYDROCHLORIDE 500 MG/1
TABLET, EXTENDED RELEASE ORAL
Qty: 30 TABLET | Refills: 2 | Status: SHIPPED
Start: 2022-01-18 | End: 2022-02-22

## 2022-01-18 RX ORDER — NAPROXEN 500 MG/1
500 TABLET ORAL 2 TIMES DAILY WITH MEALS
Qty: 30 TABLET | Refills: 0 | Status: SHIPPED
Start: 2022-01-18 | End: 2022-02-22

## 2022-01-18 RX ORDER — TRIAMCINOLONE ACETONIDE 1 MG/G
CREAM TOPICAL
Qty: 80 G | Refills: 0 | Status: SHIPPED
Start: 2022-01-18 | End: 2022-02-22

## 2022-01-18 RX ORDER — CYCLOBENZAPRINE HCL 5 MG
TABLET ORAL
Qty: 30 TABLET | Refills: 0 | Status: SHIPPED
Start: 2022-01-18 | End: 2022-02-10

## 2022-01-18 ASSESSMENT — PATIENT HEALTH QUESTIONNAIRE - PHQ9: CLINICAL INTERPRETATION OF PHQ2 SCORE: 0

## 2022-01-18 NOTE — ASSESSMENT & PLAN NOTE
Had hx and took Metformin and then stopped meds. As she stopped the pills.  Patient is willing to restart medication if elevated blood sugars today in the office.

## 2022-01-18 NOTE — ASSESSMENT & PLAN NOTE
Pleasant 41-year-old here for pain in the upper extremities.  Right is greater than the left.  She gets intermittent numbness and tingling.  Does a lot of repetitive motion with upper extremities at work.  Did have MRI in 2019 which showed degenerative changes in the cervical spine.  Was taking some medications including anti-inflammatory that helped minimally.  Patient states that marijuana is the pain the most.  She occasionally does marijuana at this point and is not taking any medication but does notice significant discomfort

## 2022-01-18 NOTE — ASSESSMENT & PLAN NOTE
Itchy rash underneath both tattoos on both forearms.  Keeps her up at night.  He has tried emollients but no prescriptions

## 2022-01-18 NOTE — PROGRESS NOTES
Subjective:   Caridad Nino is a 41 y.o. female here today for rash    DM2 (diabetes mellitus, type 2) ([facility])  Had hx and took Metformin and then stopped meds. As she stopped the pills.  Patient is willing to restart medication if elevated blood sugars today in the office.      Cervical radiculopathy  Pleasant 41-year-old here for pain in the upper extremities.  Right is greater than the left.  She gets intermittent numbness and tingling.  Does a lot of repetitive motion with upper extremities at work.  Did have MRI in 2019 which showed degenerative changes in the cervical spine.  Was taking some medications including anti-inflammatory that helped minimally.  Patient states that marijuana is the pain the most.  She occasionally does marijuana at this point and is not taking any medication but does notice significant discomfort         Current medicines (including changes today)  Current Outpatient Medications   Medication Sig Dispense Refill   • triamcinolone acetonide (KENALOG) 0.1 % Cream Apply sparingly to affected areas on the arms twice daily for 7 to 10 days 80 g 0   • hydrOXYzine HCl (ATARAX) 25 MG Tab Take 1 Tablet by mouth 3 times a day as needed for Itching. 30 Tablet 0   • naproxen (NAPROSYN) 500 MG Tab Take 1 Tablet by mouth 2 times a day with meals. 30 Tablet 0   • cyclobenzaprine (FLEXERIL) 5 mg tablet Take 1 tablet twice daily as needed for muscle spasming 30 Tablet 0   • metFORMIN ER (GLUCOPHAGE XR) 500 MG TABLET SR 24 HR Take 1 tablet daily with breakfast 30 Tablet 2     No current facility-administered medications for this visit.     She  has a past medical history of Cervical radiculopathy (1/18/2022), Dental disorder, Hemorrhoids (1998), History of shingles (June/July 2016), Hypertension (1997), Morbid obesity due to excess calories ([facility]) (12/3/2016), Normocytic anemia (8/15/2017), Pneumonia, Snoring, Thrombocytopenia ([facility]) (8/14/2017), Thyroid nodule, Type 2 diabetes mellitus  "without complication, without long-term current use of insulin (HCC), and Vertigo (8/14/2017). She also has no past medical history of Addisons disease (HCC), Allergy, Anesthesia, Anginal syndrome (HCC), Anxiety, Arrhythmia, Arthritis, Blood clotting disorder (HCC), Blood transfusion without reported diagnosis, Bowel habit changes, Breath shortness, Carcinoma in situ of respiratory system, Cataract, Clotting disorder (HCC), Cold, Continuous ambulatory peritoneal dialysis status (HCC), Coughing blood, Cushings syndrome (HCC), Depression, Diabetic neuropathy (HCC), Dialysis patient (HCC), Encounter for long-term (current) use of other medications, GERD (gastroesophageal reflux disease), Glaucoma, Gynecological disorder, Heart attack (HCC), Heart murmur, Heart valve disease, Hemorrhagic disorder (HCC), Hepatitis A, Hepatitis B, Hepatitis C, Hiatus hernia syndrome, High cholesterol, HIV (human immunodeficiency virus infection) (HCC), Hyperlipidemia, IBD (inflammatory bowel disease), Indigestion, Jaundice, Meningitis, Migraine, Osteoporosis, Pacemaker, Parathyroid disorder (HCC), Pituitary disease (HCC), Pulmonary emphysema (HCC), Rheumatic fever, Seizure (HCC), Sickle cell disease (HCC), Sleep apnea, Substance abuse (HCC), Tuberculosis, Urinary bladder disorder, or Urinary incontinence.  Amoxicillin     Social History and Family History were reviewed and updated.    ROS   No headaches, chest pain, no shortness of breath, abdominal pain, nausea, or vomiting.  All other systems were reviewed and are negative or noted as positive in the HPI.       Objective:     /76 (BP Location: Right arm, Patient Position: Sitting, BP Cuff Size: Large adult)   Pulse 85   Temp 36.3 °C (97.3 °F) (Temporal)   Ht 1.702 m (5' 7\")   Wt 115 kg (253 lb)   SpO2 98%  Body mass index is 39.63 kg/m².     Physical Exam:  Constitutional: ANO x3, no acute distress, well-nourished, well-hydrated   Skin: Warm, dry, good turgor, no rashes in " visible areas.  HEENT: Is normocephalic atraumatic, good PERRLA, extraocular movements intact, TMs and oropharynx are clear with good dentition   Neck: Soft and supple, trachea midline, no masses.  No thyroid megaly or cervical lymphadenopathy noted  Cardiovascular: Regular rate and rhythm.  Normal S1 and 2, no murmurs, rubs or gallops.  No edema noted  Lungs: Clear to auscultation bilaterally.  No wheezes, rales or rhonchi.  Good inspiratory and expiratory breath sounds  Abdomen: Soft, non-tender, no masses.  No hepatosplenomegaly.  Negative Knox's  Psych: Alert and oriented x3, normal affect and mood.  Neuro: Cranial nerves II through XII were assessed and intact.  Normal gait, normal cerebellar function noted  Musculoskeletal: Full range of motion, good strength against resistance  : Small bumps noted underneath tattoo on the left forearms.  Does not appear to be erythematous or warm no fluctuance noted    Clinical Course/Lab Analysis:  POCT hemoglobin A1c was 8.7      Assessment and Plan:   The following treatment plan was discussed.  Signs and symptoms for which to return were discussed with patient at length.  Patient verbalized understanding.    1. Type 2 diabetes mellitus with hyperosmolarity without coma, without long-term current use of insulin (HCC)  Again unstable.  Patient needs to be restarted on metformin.  Please follow-up in 2 to 4 weeks for recheck  - POCT Hemoglobin A1C  - metFORMIN ER (GLUCOPHAGE XR) 500 MG TABLET SR 24 HR; Take 1 tablet daily with breakfast  Dispense: 30 Tablet; Refill: 2    2. Dermatitis  And unstable  - triamcinolone acetonide (KENALOG) 0.1 % Cream; Apply sparingly to affected areas on the arms twice daily for 7 to 10 days  Dispense: 80 g; Refill: 0  - hydrOXYzine HCl (ATARAX) 25 MG Tab; Take 1 Tablet by mouth 3 times a day as needed for Itching.  Dispense: 30 Tablet; Refill: 0    3. Cervical radiculopathy  Chronic and unstable  - Referral to Pain Clinic  - naproxen  (NAPROSYN) 500 MG Tab; Take 1 Tablet by mouth 2 times a day with meals.  Dispense: 30 Tablet; Refill: 0  - cyclobenzaprine (FLEXERIL) 5 mg tablet; Take 1 tablet twice daily as needed for muscle spasming  Dispense: 30 Tablet; Refill: 0         Followup:    Please note that this dictation was created using voice recognition software. I have made every reasonable attempt to correct obvious errors, but I expect that there are errors of grammar and possibly content that I did not discover before finalizing the note.

## 2022-02-02 DIAGNOSIS — M54.12 CERVICAL RADICULOPATHY: ICD-10-CM

## 2022-02-02 RX ORDER — NAPROXEN 500 MG/1
500 TABLET ORAL 2 TIMES DAILY WITH MEALS
Qty: 30 TABLET | Refills: 0 | OUTPATIENT
Start: 2022-02-02

## 2022-02-02 RX ORDER — CYCLOBENZAPRINE HCL 5 MG
TABLET ORAL
Qty: 30 TABLET | Refills: 0 | OUTPATIENT
Start: 2022-02-02

## 2022-02-02 NOTE — TELEPHONE ENCOUNTER
Received request via: Pharmacy    Was the patient seen in the last year in this department? No     Does the patient have an active prescription (recently filled or refills available) for medication(s) requested?  no

## 2022-02-10 ENCOUNTER — OFFICE VISIT (OUTPATIENT)
Dept: MEDICAL GROUP | Facility: MEDICAL CENTER | Age: 42
End: 2022-02-10
Payer: COMMERCIAL

## 2022-02-10 VITALS
OXYGEN SATURATION: 95 % | SYSTOLIC BLOOD PRESSURE: 132 MMHG | HEIGHT: 67 IN | TEMPERATURE: 97.2 F | DIASTOLIC BLOOD PRESSURE: 82 MMHG | HEART RATE: 104 BPM | WEIGHT: 252 LBS | BODY MASS INDEX: 39.55 KG/M2

## 2022-02-10 DIAGNOSIS — R45.89 TEARFULNESS: ICD-10-CM

## 2022-02-10 DIAGNOSIS — M54.12 CERVICAL RADICULOPATHY: ICD-10-CM

## 2022-02-10 DIAGNOSIS — E11.65 TYPE 2 DIABETES MELLITUS WITH HYPERGLYCEMIA, WITHOUT LONG-TERM CURRENT USE OF INSULIN (HCC): ICD-10-CM

## 2022-02-10 DIAGNOSIS — Z12.31 ENCOUNTER FOR SCREENING MAMMOGRAM FOR BREAST CANCER: ICD-10-CM

## 2022-02-10 PROBLEM — K08.9 DENTAL DISORDER: Status: RESOLVED | Noted: 2019-08-22 | Resolved: 2022-02-10

## 2022-02-10 PROBLEM — J18.9 PNEUMONIA DUE TO INFECTIOUS ORGANISM: Status: RESOLVED | Noted: 2019-08-22 | Resolved: 2022-02-10

## 2022-02-10 PROBLEM — I15.2 HYPERTENSION DUE TO ENDOCRINE DISORDER: Status: RESOLVED | Noted: 2019-08-22 | Resolved: 2022-02-10

## 2022-02-10 PROBLEM — R42 VERTIGO: Status: RESOLVED | Noted: 2017-08-14 | Resolved: 2022-02-10

## 2022-02-10 PROBLEM — Z86.19 HISTORY OF SHINGLES: Status: RESOLVED | Noted: 2019-08-22 | Resolved: 2022-02-10

## 2022-02-10 PROCEDURE — 99214 OFFICE O/P EST MOD 30 MIN: CPT | Performed by: PHYSICIAN ASSISTANT

## 2022-02-10 RX ORDER — CYCLOBENZAPRINE HCL 5 MG
5 TABLET ORAL 3 TIMES DAILY PRN
Qty: 30 TABLET | Refills: 0 | Status: SHIPPED
Start: 2022-02-10 | End: 2022-02-22

## 2022-02-10 NOTE — ASSESSMENT & PLAN NOTE
She has been taking Metformin 500 mg daily.  She has been taking it faithfully.  Has lost weight with diet and exercise.  Has been doing well.  Notes increased urination slightly but denies any other intolerable side effects.  No diarrhea, abdominal pain increased thirst

## 2022-02-10 NOTE — ASSESSMENT & PLAN NOTE
Pleasant 41-year-old here for pain in the upper extremities.  Right is greater than the left.  She gets intermittent numbness and tingling.  Does a lot of repetitive motion with upper extremities at work.  Did have MRI in 2019 which showed degenerative changes in the cervical spine.  Was taking some medications including anti-inflammatory that helped minimally.  Patient states that marijuana is the pain the most.  Since last visit patient reports she has not smoked marijuana for the pain but has taken naproxen.  It is helped slightly but she still feels significant tightness in the upper neck region.  She has pending physiatry appointment in 4 days

## 2022-02-10 NOTE — PROGRESS NOTES
Subjective:   Caridad Nino is a 41 y.o. female here today for     DM2 (diabetes mellitus, type 2) ([facility])  She has been taking Metformin 500 mg daily.  She has been taking it faithfully.  Has lost weight with diet and exercise.  Has been doing well.  Notes increased urination slightly but denies any other intolerable side effects.  No diarrhea, abdominal pain increased thirst    Cervical radiculopathy  Pleasant 41-year-old here for pain in the upper extremities.  Right is greater than the left.  She gets intermittent numbness and tingling.  Does a lot of repetitive motion with upper extremities at work.  Did have MRI in 2019 which showed degenerative changes in the cervical spine.  Was taking some medications including anti-inflammatory that helped minimally.  Patient states that marijuana is the pain the most.  Since last visit patient reports she has not smoked marijuana for the pain but has taken naproxen.  It is helped slightly but she still feels significant tightness in the upper neck region.  She has pending physiatry appointment in 4 days         Current medicines (including changes today)  Current Outpatient Medications   Medication Sig Dispense Refill   • cyclobenzaprine (FLEXERIL) 5 mg tablet Take 1 Tablet by mouth 3 times a day as needed. 30 Tablet 0   • triamcinolone acetonide (KENALOG) 0.1 % Cream Apply sparingly to affected areas on the arms twice daily for 7 to 10 days 80 g 0   • hydrOXYzine HCl (ATARAX) 25 MG Tab Take 1 Tablet by mouth 3 times a day as needed for Itching. 30 Tablet 0   • naproxen (NAPROSYN) 500 MG Tab Take 1 Tablet by mouth 2 times a day with meals. 30 Tablet 0   • metFORMIN ER (GLUCOPHAGE XR) 500 MG TABLET SR 24 HR Take 1 tablet daily with breakfast 30 Tablet 2     No current facility-administered medications for this visit.     She  has a past medical history of Cervical radiculopathy (1/18/2022), Dental disorder, Hemorrhoids (1998), History of shingles (June/July 2016),  Hypertension (1997), Hypertension due to endocrine disorder (8/22/2019), Morbid obesity due to excess calories (HCC) (12/3/2016), Normocytic anemia (8/15/2017), Pneumonia, Snoring, Thrombocytopenia (HCC) (8/14/2017), Thyroid nodule, Type 2 diabetes mellitus without complication, without long-term current use of insulin (HCC), and Vertigo (8/14/2017). She also has no past medical history of Addisons disease (HCC), Allergy, Anesthesia, Anginal syndrome (HCC), Anxiety, Arrhythmia, Arthritis, Blood clotting disorder (HCC), Blood transfusion without reported diagnosis, Bowel habit changes, Breath shortness, Carcinoma in situ of respiratory system, Cataract, Clotting disorder (HCC), Cold, Continuous ambulatory peritoneal dialysis status (HCC), Coughing blood, Cushings syndrome (HCC), Depression, Diabetic neuropathy (HCC), Dialysis patient (HCC), Encounter for long-term (current) use of other medications, GERD (gastroesophageal reflux disease), Glaucoma, Gynecological disorder, Heart attack (HCC), Heart murmur, Heart valve disease, Hemorrhagic disorder (HCC), Hepatitis A, Hepatitis B, Hepatitis C, Hiatus hernia syndrome, High cholesterol, HIV (human immunodeficiency virus infection) (HCC), Hyperlipidemia, IBD (inflammatory bowel disease), Indigestion, Jaundice, Meningitis, Migraine, Osteoporosis, Pacemaker, Parathyroid disorder (HCC), Pituitary disease (HCC), Pulmonary emphysema (HCC), Rheumatic fever, Seizure (HCC), Sickle cell disease (HCC), Sleep apnea, Substance abuse (HCC), Tuberculosis, Urinary bladder disorder, or Urinary incontinence.  Amoxicillin     Social History and Family History were reviewed and updated.    ROS   No headaches, chest pain, no shortness of breath, abdominal pain, nausea, or vomiting.  All other systems were reviewed and are negative or noted as positive in the HPI.       Objective:     /82 (BP Location: Right arm, Patient Position: Sitting, BP Cuff Size: Large adult)   Pulse (!) 104    "Temp 36.2 °C (97.2 °F) (Temporal)   Ht 1.702 m (5' 7\")   Wt 114 kg (252 lb)   SpO2 95%  Body mass index is 39.47 kg/m².     Physical Exam:  Constitutional: ANO x3, no acute distress, well-nourished, well-hydrated   Skin: Warm, dry, good turgor, no rashes in visible areas.  HEENT: Is normocephalic atraumatic, good PERRLA, extraocular movements intact, TMs and oropharynx are clear with good dentition   Neck: Soft and supple, trachea midline, no masses.  No thyroid megaly or cervical lymphadenopathy noted  Cardiovascular: Regular rate and rhythm.  Normal S1 and 2, no murmurs, rubs or gallops.  No edema noted  Lungs: Clear to auscultation bilaterally.  No wheezes, rales or rhonchi.  Good inspiratory and expiratory breath sounds  Abdomen: Soft, non-tender, no masses.  No hepatosplenomegaly.  Negative Knox's  Psych: Alert and oriented x3, normal affect and mood.  Neuro: Cranial nerves II through XII were assessed and intact.  Normal gait, normal cerebellar function noted  Musculoskeletal: Full range of motion, good strength against resistance    Clinical Course/Lab Analysis:        Assessment and Plan:   The following treatment plan was discussed.  Signs and symptoms for which to return were discussed with patient at length.  Patient verbalized understanding.    1. Type 2 diabetes mellitus with hyperglycemia, without long-term current use of insulin (HCC)  Chronic and unstable.  Hemoglobin A1c 3 weeks ago was 8.7 sent  Patient is on Metformin 500 mg daily.  She has been losing weight with diet and exercise.  Will order some labs and have her come back to follow-up in 8 weeks.  May may need adjustment in medication  - Comp Metabolic Panel; Future  - Lipid Profile; Future  - Referral to Diabetic Education    2. Encounter for screening mammogram for breast cancer  - MA-SCREENING MAMMO BILAT W/TOMOSYNTHESIS W/CAD; Future    3. Cervical radiculopathy  Chronic and unstable.  Patient is taking naproxen and its helped " slightly.  Muscle relaxer helps a bit.  Did discuss avoiding marijuana.  She is having evaluation by physiatry in 4 days.  May need repeat MRI but she did have one in 2019.  - cyclobenzaprine (FLEXERIL) 5 mg tablet; Take 1 Tablet by mouth 3 times a day as needed.  Dispense: 30 Tablet; Refill: 0    3.  Tearfulness.  New and unstable  Patient is instructed to follow-up in a week.  We will do PHQ-9 and ALESSANDRO-7 at that time.  May initiate therapy with either duloxetine as it helps with depression but also analgesia or Zoloft.         Followup: Next week for assessment evaluation depression    Please note that this dictation was created using voice recognition software. I have made every reasonable attempt to correct obvious errors, but I expect that there are errors of grammar and possibly content that I did not discover before finalizing the note.

## 2022-02-15 ENCOUNTER — OFFICE VISIT (OUTPATIENT)
Dept: PHYSICAL MEDICINE AND REHAB | Facility: MEDICAL CENTER | Age: 42
End: 2022-02-15
Payer: COMMERCIAL

## 2022-02-15 VITALS
HEART RATE: 105 BPM | TEMPERATURE: 97.4 F | DIASTOLIC BLOOD PRESSURE: 82 MMHG | HEIGHT: 68 IN | SYSTOLIC BLOOD PRESSURE: 138 MMHG | WEIGHT: 247.58 LBS | BODY MASS INDEX: 37.52 KG/M2 | OXYGEN SATURATION: 95 %

## 2022-02-15 DIAGNOSIS — M54.2 CERVICALGIA: ICD-10-CM

## 2022-02-15 DIAGNOSIS — R20.2 NUMBNESS AND TINGLING IN BOTH HANDS: Primary | ICD-10-CM

## 2022-02-15 DIAGNOSIS — M48.02 NEUROFORAMINAL STENOSIS OF CERVICAL SPINE: ICD-10-CM

## 2022-02-15 DIAGNOSIS — R20.0 NUMBNESS AND TINGLING IN BOTH HANDS: Primary | ICD-10-CM

## 2022-02-15 DIAGNOSIS — M79.10 MYALGIA: ICD-10-CM

## 2022-02-15 PROCEDURE — 99204 OFFICE O/P NEW MOD 45 MIN: CPT | Performed by: STUDENT IN AN ORGANIZED HEALTH CARE EDUCATION/TRAINING PROGRAM

## 2022-02-15 RX ORDER — TIZANIDINE 2 MG/1
2-4 TABLET ORAL NIGHTLY PRN
Qty: 60 TABLET | Refills: 2 | Status: SHIPPED
Start: 2022-02-15 | End: 2022-04-22

## 2022-02-15 ASSESSMENT — PAIN SCALES - GENERAL: PAINLEVEL: 8=MODERATE-SEVERE PAIN

## 2022-02-15 ASSESSMENT — PATIENT HEALTH QUESTIONNAIRE - PHQ9
5. POOR APPETITE OR OVEREATING: 1 - SEVERAL DAYS
SUM OF ALL RESPONSES TO PHQ QUESTIONS 1-9: 10
CLINICAL INTERPRETATION OF PHQ2 SCORE: 2

## 2022-02-15 NOTE — Clinical Note
Amadeo Guillaume,    Thanks for referring Caridad Nino to my office. My current impression is bilateral carpal tunnel syndrome and myofascial pain.  I do not think that she has active signs of cervical radiculopathy at this time.  I ordered an EMG and referred her to physical therapy.  I also prescribed tizanidine QHS PRN since she is having difficulty sleeping and Flexeril only helps for 1 hour. She may continue taking Flexeril 5 mg PRN during the day since it doesn't make her sleepy.  I told her to separate tizanidine from Flexeril by at least 8 hours.      I will see her back after her EMG to discuss further steps. We discussed possible trigger point injections which she would like to defer at this time.    Thanks and please let me know if you have any questions.    Laura Lewis MD  Interventional Pain Management  [facility]

## 2022-02-15 NOTE — PROGRESS NOTES
"New Patient Note    Interventional Pain and Spine  Physiatry (Physical Medicine and Rehabilitation)     Patient Name: Caridad Nino  : 1980  Date of Service: 2/15/2022  PCP: Ivania Prince P.A.-C.  Referring Provider: Ivania Prince P.A.-C.    Chief Complaint:   Chief Complaint   Patient presents with   • New Patient     Cervical radiculopathy       HISTORY    HPI:  Caridad Nino is a 41 y.o. RHD female who presents today with painful muscle tightness in bilateral paracervical muscles, upper trapezius, and posterior shoulders, tightness and squeezing in bilateral arms, and constant painful numbness and tingling in both hands, worst at 3rd and 4th fingers bilaterally (R>L). States these symptoms have been chronic for years but recently worsened after she stopped smoking marijuana.    Her pain at its best-worse level during the course of the day is 7-8/10, respectively. Pain right now is 7/10 on the numeric pain scale. Pain is constant and worsens with wrist compression and improves with moving her arms. Painful numbness wakes her up at night. The patient has difficulty washing dishes, picking up objects due to pain and numbness in hands; she otherwise denies new weakness, numbness, or bladder/bowel incontinence.     Of note she states she was previously \"always high\" from marijuana so she was less aware of her pain. 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 Denies previous history of carpal tunnel syndrome or significant pain radiating from neck into hands.     The patient has not done physical therapy for this problem    Patient has tried the following medications with varied success (current meds in bold):   flexeril 5mg. Denies SE of drowsiness. Helps for 1 hr.   Naproxen  Gabapentin - no " relief    Therapeutic modalities and interventional therapies to date include:  -no injections    Medical history includes BMI 37, T2DM, thrombocytopenia, MRI evidence of cervical neuroforaminal stenosis.    Psychological testing for pain as depression and pain commonly coexist and need to both be treated.     Opioid Risk Score: 14    Interpretation of Opioid Risk Score   Score 0-3 = Low risk of abuse. Do UDS at least once per year.  Score 4-7 = Moderate risk of abuse. Do UDS 1-4 times per year.  Score 8+ = High risk of abuse. Refer to specialist.    PHQ  Depression Screen (PHQ-2/PHQ-9) 8/27/2019 1/18/2022 2/15/2022   PHQ-2 Total Score - - -   PHQ-2 Total Score 0 0 2   PHQ-9 Total Score - - -   PHQ-9 Total Score - - 10       Interpretation of PHQ-9 Total Score   Score Severity   1-4 No Depression   5-9 Mild Depression   10-14 Moderate Depression   15-19 Moderately Severe Depression   20-27 Severe Depression        Medical records review:  I reviewed the note from the referring provider Ivania Prince P.A.-C. including the note dated 1/18/2022 and 2/10/2022.    ROS:   Red Flags ROS:   Fever, Chills, Sweats: Denies  Involuntary Weight Loss: Denies  Bladder Incontinence: Denies  Bowel Incontinence: denies  Saddle Anesthesia: Denies    All other systems reviewed and negative.     PMHx:   Past Medical History:   Diagnosis Date   • Cervical radiculopathy 1/18/2022   • Dental disorder    • Hemorrhoids 1998   • History of shingles June/July 2016   • Hypertension 1997    JUST DURING PREGNANCY   • Hypertension due to endocrine disorder 8/22/2019   • Morbid obesity due to excess calories ([facility]) 12/3/2016   • Normocytic anemia 8/15/2017   • Pneumonia    • Snoring    • Thrombocytopenia ([facility]) 8/14/2017   • Thyroid nodule    • Type 2 diabetes mellitus without complication, without long-term current use of insulin ([facility])    • Vertigo 8/14/2017       PSHx:   Past Surgical History:   Procedure Laterality Date   • CHOLECYSTECTOMY  1998        Family Hx:   Family History   Problem Relation Age of Onset   • Diabetes Mother    • Other Mother         MS   • Cancer Mother         colon   • Cancer Sister         throat   • Alcohol abuse Paternal Grandmother        Social Hx:  Social History     Socioeconomic History   • Marital status:      Spouse name: Not on file   • Number of children: Not on file   • Years of education: Not on file   • Highest education level: Not on file   Occupational History   • Not on file   Tobacco Use   • Smoking status: Former Smoker     Packs/day: 1.00     Years: 27.00     Pack years: 27.00     Types: Cigarettes     Start date: 1989     Quit date: 2016     Years since quittin.8   • Smokeless tobacco: Never Used   Vaping Use   • Vaping Use: Never used   Substance and Sexual Activity   • Alcohol use: Yes     Comment: rarely    • Drug use: No   • Sexual activity: Yes     Partners: Male   Other Topics Concern   • Not on file   Social History Narrative   • Not on file     Social Determinants of Health     Financial Resource Strain:    • Difficulty of Paying Living Expenses: Not on file   Food Insecurity:    • Worried About Running Out of Food in the Last Year: Not on file   • Ran Out of Food in the Last Year: Not on file   Transportation Needs:    • Lack of Transportation (Medical): Not on file   • Lack of Transportation (Non-Medical): Not on file   Physical Activity:    • Days of Exercise per Week: Not on file   • Minutes of Exercise per Session: Not on file   Stress:    • Feeling of Stress : Not on file   Social Connections:    • Frequency of Communication with Friends and Family: Not on file   • Frequency of Social Gatherings with Friends and Family: Not on file   • Attends Sikhism Services: Not on file   • Active Member of Clubs or Organizations: Not on file   • Attends Club or Organization Meetings: Not on file   • Marital Status: Not on file   Intimate Partner Violence:    • Fear of Current or  "Ex-Partner: Not on file   • Emotionally Abused: Not on file   • Physically Abused: Not on file   • Sexually Abused: Not on file   Housing Stability:    • Unable to Pay for Housing in the Last Year: Not on file   • Number of Places Lived in the Last Year: Not on file   • Unstable Housing in the Last Year: Not on file       Allergies:  Allergies   Allergen Reactions   • Amoxicillin        Medications: reviewed on epic.   Outpatient Medications Marked as Taking for the 2/15/22 encounter (Office Visit) with Laura Lewis M.D.   Medication Sig Dispense Refill   • tizanidine (ZANAFLEX) 2 MG tablet Take 1-2 Tablets by mouth at bedtime as needed (muscle spasms). 60 Tablet 2   • cyclobenzaprine (FLEXERIL) 5 mg tablet Take 1 Tablet by mouth 3 times a day as needed. 30 Tablet 0   • metFORMIN ER (GLUCOPHAGE XR) 500 MG TABLET SR 24 HR Take 1 tablet daily with breakfast 30 Tablet 2        Current Outpatient Medications on File Prior to Visit   Medication Sig Dispense Refill   • cyclobenzaprine (FLEXERIL) 5 mg tablet Take 1 Tablet by mouth 3 times a day as needed. 30 Tablet 0   • metFORMIN ER (GLUCOPHAGE XR) 500 MG TABLET SR 24 HR Take 1 tablet daily with breakfast 30 Tablet 2   • triamcinolone acetonide (KENALOG) 0.1 % Cream Apply sparingly to affected areas on the arms twice daily for 7 to 10 days (Patient not taking: Reported on 2/15/2022) 80 g 0   • hydrOXYzine HCl (ATARAX) 25 MG Tab Take 1 Tablet by mouth 3 times a day as needed for Itching. (Patient not taking: Reported on 2/15/2022) 30 Tablet 0   • naproxen (NAPROSYN) 500 MG Tab Take 1 Tablet by mouth 2 times a day with meals. (Patient not taking: Reported on 2/15/2022) 30 Tablet 0     No current facility-administered medications on file prior to visit.         EXAMINATION     Physical Exam:   /82 (BP Location: Right arm, Patient Position: Sitting, BP Cuff Size: Adult)   Pulse (!) 105   Temp 36.3 °C (97.4 °F) (Temporal)   Ht 1.727 m (5' 8\")   Wt 112 kg (247 lb " 9.2 oz)   SpO2 95%     Constitutional:   Body Habitus: Body mass index is 37.64 kg/m².  Cooperation: Fully cooperates with exam  Appearance: Well-groomed, well-nourished.    Eyes: No scleral icterus to suggest severe liver disease, no proptosis to suggest severe hyperthyroidism    ENT -no obvious auditory deficits, no noticeable facial droop     Skin -no rashes or lesions noted     Respiratory-  breathing comfortably on room air, no audible wheezing    Cardiovascular-distal extremities warm and well perfused.  No lower extremity edema is noted.     Gastrointestinal - no obvious abdominal masses, non-distended    Psychiatric- alert and oriented ×3. Normal affect.     Gait - normal gait, no use of ambulatory device, nonantalgic.       Musculoskeletal and Neuro -     Bilateral hands:   Inspection: No swelling, deformities, or rashes. Symmetric appearing thenar and hyperthenar regions bilaterally.  Palpation: tenderness to palpation at bilateral volar wrists. Otherwise no significant tenderness to palpation throughout the bilateral hands  Range of motion is within normal limits throughout bilateral hands, fingers and wrist.    Special tests:  Tinel's at the wrist over the median nerve positive on left, negative on right  Carpal tunnel compression: positive on left, negative on right  Phalen's test: positive bilaterally  Tinel's at the cubital tunnel: negative bilaterally      Cervical spine   Inspection: No deformities of the skin over the cervical spine. No rashes or lesions.    limited active range of motion in all directions    Spurling's sign  negative bilaterally  Cervical facet loading maneuver  negative bilaterally but reproduces pain at contralateral upper trapezius and paracervical muscle    No signs of muscular atrophy in bilateral upper extremities     Tenderness to palpation at paracervical muscles bilaterally and upper trapezius bilaterally with palpable myofascial trigger points. No tenderness to  palpation elsewhere including midline of cervical spine and cervical facets bilaterally.    Key points for the international standards for neurological classification of spinal cord injury (ISNCSCI) to light touch.     Dermatome R L   C4 2 2   C5 2 2   C6 2 2   C7 1 1   C8 2 2   T1 2 2   T2 2 2       Motor Exam Upper Extremities   ? Myotome R L   Shoulder abduction C5 5 5   Elbow flexion C5 5 5   Wrist extension C6 5 5   Elbow extension C7 5 5   Finger flexion C8 5 5   Finger abduction T1 5 5     Reflexes  ?  R L   Biceps  2+ 2+   Brachioradialis  2+ 2+     Wills's sign negative bilaterally       MEDICAL DECISION MAKING    Medical records review: see under HPI section.     DATA    Labs: Personally reviewed at today's visit    Lab Results   Component Value Date/Time    SODIUM 136 08/08/2019 06:43 PM    POTASSIUM 4.2 08/08/2019 06:43 PM    CHLORIDE 100 08/08/2019 06:43 PM    CO2 29 08/08/2019 06:43 PM    ANION 7.0 08/08/2019 06:43 PM    GLUCOSE 261 (H) 08/08/2019 06:43 PM    BUN 15 08/08/2019 06:43 PM    CREATININE 0.60 08/08/2019 06:43 PM    CALCIUM 9.4 08/08/2019 06:43 PM    ASTSGOT 32 08/08/2019 06:43 PM    ALTSGPT 29 08/08/2019 06:43 PM    TBILIRUBIN 0.3 08/08/2019 06:43 PM    ALBUMIN 4.2 08/08/2019 06:43 PM    TOTPROTEIN 7.7 08/08/2019 06:43 PM    GLOBULIN 3.5 08/08/2019 06:43 PM    AGRATIO 1.2 08/08/2019 06:43 PM       Lab Results   Component Value Date/Time    PROTHROMBTM 13.0 08/14/2017 01:17 PM    INR 0.95 08/14/2017 01:17 PM        Lab Results   Component Value Date/Time    WBC 7.8 08/08/2019 06:43 PM    RBC 4.83 08/08/2019 06:43 PM    HEMOGLOBIN 13.7 08/08/2019 06:43 PM    HEMATOCRIT 44.7 08/08/2019 06:43 PM    MCV 92.5 08/08/2019 06:43 PM    MCH 28.4 08/08/2019 06:43 PM    MCHC 30.6 (L) 08/08/2019 06:43 PM    MPV 12.4 08/08/2019 06:43 PM    NEUTSPOLYS 53.40 08/08/2019 06:43 PM    LYMPHOCYTES 36.50 08/08/2019 06:43 PM    MONOCYTES 8.10 08/08/2019 06:43 PM    EOSINOPHILS 1.20 08/08/2019 06:43 PM     BASOPHILS 0.50 08/08/2019 06:43 PM        Lab Results   Component Value Date/Time    HBA1C 8.7 (A) 01/18/2022 01:36 PM        Imaging:   I personally reviewed following images, these are my reads  MRI cervical spine  Disc bulge at C5-6 and C6-7 resulting in mild neural foraminal stenosis at C5-6 and mild-moderate neuroforaminal stenosis at C6-7.  Mild central canal stenosis at C6-7.    IMAGING radiology reads. I reviewed the following radiology reads    Results for orders placed in visit on 08/21/19    MR-CERVICAL SPINE-W/O    Addendum 8/21/2019 10:44 AM  Addendum:  At the level of C5-6 there is right paracentral disc protrusion causing effacement of the right lateral recess. There is indentation of the right side of the spinal cord contour at this level. There is mild right C6 neural foraminal stenosis.    Impression  1.  Degenerative disease in the cervical spine as described above.  2.  There is loss of cervical lordosis with kyphotic angulation at C6. This may be positional in nature.  3.  There is an approximately 1.3 x 1 cm sized nodule in the right lobe of the thyroid. Ultrasound examination is recommended for further evaluation.                                                                                               Diagnosis  Visit Diagnoses     ICD-10-CM   1. Numbness and tingling in both hands  R20.0    R20.2   2. Myalgia  M79.10   3. Cervicalgia  M54.2   4. Neuroforaminal stenosis of cervical spine  M48.02         ASSESSMENT AND PLAN:  Caridad Nino is a 41 y.o. RHD female with history of BMI 37, T2DM, thrombocytopenia, MRI evidence of cervical neuroforaminal stenosis who presents with pain at her bilateral paracervical muscles and upper trapezius reproducible with palpation suggestive of myalgias and myofascial pain as well as constant numbness and tingling in bilateral hands with exam notable for positive Tinel's at the left wrist, positive carpal compression test on left, and positive  Phalen's bilaterally suggestive of bilateral carpal tunnel syndrome.     She has neg Spurlings and denies pain radiating from her neck down to her arms, making active symptoms of cervical radiculopathy less likely. She appears to be neurologically intact on exam today.     Caridad was seen today for new patient.    Diagnoses and all orders for this visit:    Numbness and tingling in both hands  -     Referral to Neurodiagnostics (EEG,EP,EMG/NCS/DBS)    Myalgia  -     Referral to Physical Therapy    Cervicalgia  -     Referral to Physical Therapy    Neuroforaminal stenosis of cervical spine  -     Referral to Neurodiagnostics (EEG,EP,EMG/NCS/DBS)    Other orders  -     tizanidine (ZANAFLEX) 2 MG tablet; Take 1-2 Tablets by mouth at bedtime as needed (muscle spasms).          PLAN  Physical Therapy: I ordered physical therapy to focus on strengthening and stretching as well as a home exercise program.     Diagnostic workup: no further imaging needed at this time    Medications:  -Given significant component of myofascial pain and difficulty sleeping, start trial of tizanidine 2-4 mg nightly.  Counseled her to separate this with Flexeril by at least 8 hours.  Discussed that she may continue taking Flexeril 5 mg as needed during the day since it does not make her sleepy.  -  reviewed    Interventions: Discussed possible trigger point injections under ultrasound guidance which she would like to defer at this time in favor of proceeding with physical therapy and medications first.    Referrals: to neurodiagnostics for EMG/NCS to evaluate for b/l carpal tunnel syndrome versus cervical radiculopathy.  Discussed my impression that at this time I suspect that she has bilateral carpal tunnel syndrome given her exam findings.  Also with a negative Spurling's and without pain radiating from her neck down to her fingertips, I do not suspect that she is having active symptoms of cervical radiculopathy.    Follow-up: after  EMG/NCS for review    Orders Placed This Encounter   • Referral to Neurodiagnostics (EEG,EP,EMG/NCS/DBS)   • Referral to Physical Therapy   • tizanidine (ZANAFLEX) 2 MG tablet       Laura Lewis MD  Interventional Pain Management  Physical Medicine and Rehabilitation  [facility]    Ivania Roth, P.A.-BRIANNA.     The above note documents my personal evaluation of this patient. In addition, I have reviewed and confirmed with the patient and MA the supportive information documented in today's Patient Health Questionnaire and Office Note.     Please note that this dictation was created using voice recognition software. I have made every reasonable attempt to correct obvious errors, but I expect that there are errors of grammar and possibly content that I did not discover before finalizing the note.

## 2022-02-18 ENCOUNTER — HOSPITAL ENCOUNTER (OUTPATIENT)
Dept: LAB | Facility: MEDICAL CENTER | Age: 42
End: 2022-02-18
Attending: PHYSICIAN ASSISTANT
Payer: COMMERCIAL

## 2022-02-18 DIAGNOSIS — E11.65 TYPE 2 DIABETES MELLITUS WITH HYPERGLYCEMIA, WITHOUT LONG-TERM CURRENT USE OF INSULIN (HCC): ICD-10-CM

## 2022-02-18 LAB
ALBUMIN SERPL BCP-MCNC: 4.5 G/DL (ref 3.2–4.9)
ALBUMIN/GLOB SERPL: 1.6 G/DL
ALP SERPL-CCNC: 84 U/L (ref 30–99)
ALT SERPL-CCNC: 37 U/L (ref 2–50)
ANION GAP SERPL CALC-SCNC: 11 MMOL/L (ref 7–16)
AST SERPL-CCNC: 30 U/L (ref 12–45)
BILIRUB SERPL-MCNC: 0.4 MG/DL (ref 0.1–1.5)
BUN SERPL-MCNC: 13 MG/DL (ref 8–22)
CALCIUM SERPL-MCNC: 9.9 MG/DL (ref 8.5–10.5)
CHLORIDE SERPL-SCNC: 99 MMOL/L (ref 96–112)
CHOLEST SERPL-MCNC: 186 MG/DL (ref 100–199)
CO2 SERPL-SCNC: 26 MMOL/L (ref 20–33)
CREAT SERPL-MCNC: 0.6 MG/DL (ref 0.5–1.4)
FASTING STATUS PATIENT QL REPORTED: NORMAL
GLOBULIN SER CALC-MCNC: 2.9 G/DL (ref 1.9–3.5)
GLUCOSE SERPL-MCNC: 193 MG/DL (ref 65–99)
HDLC SERPL-MCNC: 39 MG/DL
LDLC SERPL CALC-MCNC: 109 MG/DL
POTASSIUM SERPL-SCNC: 4.2 MMOL/L (ref 3.6–5.5)
PROT SERPL-MCNC: 7.4 G/DL (ref 6–8.2)
SODIUM SERPL-SCNC: 136 MMOL/L (ref 135–145)
TRIGL SERPL-MCNC: 191 MG/DL (ref 0–149)

## 2022-02-18 PROCEDURE — 80061 LIPID PANEL: CPT

## 2022-02-18 PROCEDURE — 36415 COLL VENOUS BLD VENIPUNCTURE: CPT

## 2022-02-18 PROCEDURE — 80053 COMPREHEN METABOLIC PANEL: CPT

## 2022-02-22 ENCOUNTER — OFFICE VISIT (OUTPATIENT)
Dept: MEDICAL GROUP | Facility: MEDICAL CENTER | Age: 42
End: 2022-02-22
Payer: COMMERCIAL

## 2022-02-22 VITALS
HEIGHT: 68 IN | BODY MASS INDEX: 37.44 KG/M2 | SYSTOLIC BLOOD PRESSURE: 150 MMHG | WEIGHT: 247 LBS | OXYGEN SATURATION: 97 % | DIASTOLIC BLOOD PRESSURE: 82 MMHG | HEART RATE: 111 BPM | TEMPERATURE: 97.5 F

## 2022-02-22 DIAGNOSIS — M54.12 CERVICAL RADICULOPATHY: ICD-10-CM

## 2022-02-22 DIAGNOSIS — L03.012 PARONYCHIA OF LEFT MIDDLE FINGER: ICD-10-CM

## 2022-02-22 DIAGNOSIS — E78.2 MIXED HYPERLIPIDEMIA: ICD-10-CM

## 2022-02-22 DIAGNOSIS — E11.65 TYPE 2 DIABETES MELLITUS WITH HYPERGLYCEMIA, WITHOUT LONG-TERM CURRENT USE OF INSULIN (HCC): ICD-10-CM

## 2022-02-22 PROCEDURE — 99214 OFFICE O/P EST MOD 30 MIN: CPT | Performed by: PHYSICIAN ASSISTANT

## 2022-02-22 RX ORDER — KETOROLAC TROMETHAMINE 30 MG/ML
30 INJECTION, SOLUTION INTRAMUSCULAR; INTRAVENOUS ONCE
Status: COMPLETED | OUTPATIENT
Start: 2022-02-22 | End: 2022-02-22

## 2022-02-22 RX ORDER — SULFAMETHOXAZOLE AND TRIMETHOPRIM 800; 160 MG/1; MG/1
TABLET ORAL
Qty: 20 TABLET | Refills: 0 | Status: SHIPPED
Start: 2022-02-22 | End: 2022-06-26

## 2022-02-22 RX ADMIN — KETOROLAC TROMETHAMINE 30 MG: 30 INJECTION, SOLUTION INTRAMUSCULAR; INTRAVENOUS at 15:14

## 2022-02-22 NOTE — ASSESSMENT & PLAN NOTE
She reports 3-day history of painful left middle finger that has redness around the cuticle.  She does her own nails.  Denies any known trauma or injury but states it feels warm and tender no pus coming out of it

## 2022-02-22 NOTE — PROGRESS NOTES
Subjective:   Caridad Nino is a 41 y.o. female here today for follow up and finger pain    Paronychia of left middle finger  She reports 3-day history of painful left middle finger that has redness around the cuticle.  She does her own nails.  Denies any known trauma or injury but states it feels warm and tender no pus coming out of it     Also reports that she saw physiatry for cervical radicular colopathy.  Dr. Kady Trivedi.  She was given tizanidine and has some more tests to be ordered.  She states the tizanidine helped her significantly.  She does have a history of type 2 diabetes takes Metformin 500 mg daily.  Is trying to exercise but has started for 1 day    Current medicines (including changes today)  Current Outpatient Medications   Medication Sig Dispense Refill   • metFORMIN (GLUCOPHAGE) 500 MG Tab Take 1 Tablet by mouth 2 times a day with meals. 60 Tablet 3   • sulfamethoxazole-trimethoprim (BACTRIM DS) 800-160 MG tablet Take one pill twice a day for ten days 20 Tablet 0   • tizanidine (ZANAFLEX) 2 MG tablet Take 1-2 Tablets by mouth at bedtime as needed (muscle spasms). 60 Tablet 2     No current facility-administered medications for this visit.     She  has a past medical history of Cervical radiculopathy (1/18/2022), Dental disorder, Hemorrhoids (1998), History of shingles (June/July 2016), Hypertension (1997), Hypertension due to endocrine disorder (8/22/2019), Morbid obesity due to excess calories ([facility]) (12/3/2016), Normocytic anemia (8/15/2017), Pneumonia, Snoring, Thrombocytopenia ([facility]) (8/14/2017), Thyroid nodule, Type 2 diabetes mellitus without complication, without long-term current use of insulin ([facility]), and Vertigo (8/14/2017).    She has no past medical history of Addisons disease ([facility]), Allergy, Anesthesia, Anginal syndrome ([facility]), Anxiety, Arrhythmia, Arthritis, Blood clotting disorder ([facility]), Blood transfusion without reported diagnosis, Bowel habit changes, Breath shortness,  "Carcinoma in situ of respiratory system, Cataract, Clotting disorder (HCC), Cold, Continuous ambulatory peritoneal dialysis status (HCC), Coughing blood, Cushings syndrome (HCC), Depression, Diabetic neuropathy (HCC), Dialysis patient (HCC), Encounter for long-term (current) use of other medications, GERD (gastroesophageal reflux disease), Glaucoma, Gynecological disorder, Heart attack (HCC), Heart murmur, Heart valve disease, Hemorrhagic disorder (HCC), Hepatitis A, Hepatitis B, Hepatitis C, Hiatus hernia syndrome, High cholesterol, HIV (human immunodeficiency virus infection) (HCC), Hyperlipidemia, IBD (inflammatory bowel disease), Indigestion, Jaundice, Meningitis, Migraine, Osteoporosis, Pacemaker, Parathyroid disorder (HCC), Pituitary disease (HCC), Pulmonary emphysema (HCC), Rheumatic fever, Seizure (HCC), Sickle cell disease (HCC), Sleep apnea, Substance abuse (HCC), Tuberculosis, Urinary bladder disorder, or Urinary incontinence.  Amoxicillin     Social History and Family History were reviewed and updated.    ROS   No headaches, chest pain, no shortness of breath, abdominal pain, nausea, or vomiting.  All other systems were reviewed and are negative or noted as positive in the HPI.       Objective:     /82 (BP Location: Right arm, Patient Position: Sitting, BP Cuff Size: Large adult)   Pulse (!) 111   Temp 36.4 °C (97.5 °F) (Temporal)   Ht 1.727 m (5' 8\")   Wt 112 kg (247 lb)   SpO2 97%  Body mass index is 37.56 kg/m².     Physical Exam:  Constitutional: ANO x3, no acute distress, well-nourished, well-hydrated   Skin: Finger left hand has some redness at the cuticle as well as DIP region.  No pus formation.  The nails are painted with thick nail so it is difficult to ascertain what the nail looks like   HEENT: Is normocephalic atraumatic, good PERRLA, extraocular movements intact, TMs and oropharynx are clear with good dentition   Neck: Soft and supple, trachea midline, no masses.  No thyroid " megaly or cervical lymphadenopathy noted  Cardiovascular: Regular rate and rhythm.  Normal S1 and 2, no murmurs, rubs or gallops.  No edema noted  Lungs: Clear to auscultation bilaterally.  No wheezes, rales or rhonchi.  Good inspiratory and expiratory breath sounds  Abdomen: Soft, non-tender, no masses.  No hepatosplenomegaly.  Negative Knox's  Psych: Alert and oriented x3, normal affect and mood.  Neuro: Cranial nerves II through XII were assessed and intact.  Normal gait, normal cerebellar function noted  Musculoskeletal: Full range of motion, good strength against resistance    Clinical Course/Lab Analysis:    Lab Results   Component Value Date/Time    CHOLSTRLTOT 186 02/18/2022 10:16 AM     (H) 02/18/2022 10:16 AM    HDL 39 (A) 02/18/2022 10:16 AM    TRIGLYCERIDE 191 (H) 02/18/2022 10:16 AM       Lab Results   Component Value Date/Time    SODIUM 136 02/18/2022 10:16 AM    POTASSIUM 4.2 02/18/2022 10:16 AM    CHLORIDE 99 02/18/2022 10:16 AM    CO2 26 02/18/2022 10:16 AM    GLUCOSE 193 (H) 02/18/2022 10:16 AM    BUN 13 02/18/2022 10:16 AM    CREATININE 0.60 02/18/2022 10:16 AM     Lab Results   Component Value Date/Time    ALKPHOSPHAT 84 02/18/2022 10:16 AM    ASTSGOT 30 02/18/2022 10:16 AM    ALTSGPT 37 02/18/2022 10:16 AM    TBILIRUBIN 0.4 02/18/2022 10:16 AM          Assessment and Plan:   The following treatment plan was discussed.  Signs and symptoms for which to return were discussed with patient at length.  Patient verbalized understanding.    1. Mixed hyperlipidemia  New and unstable  Recommend starting Crestor.  Patient defers.  Will recheck in 6-month and if elevated she needs to start.  Discussed increased risk for heart disease with type 2 diabetes    2. Type 2 diabetes mellitus with hyperglycemia, without long-term current use of insulin (HCC)  Chronic and unstable  Discussed increasing Metformin to 500 mg twice daily with food to help achieve hemoglobin A1c goals.  Last hemoglobin A1c was  8.7  - metFORMIN (GLUCOPHAGE) 500 MG Tab; Take 1 Tablet by mouth 2 times a day with meals.  Dispense: 60 Tablet; Refill: 3    3. Cervical radiculopathy  Chronic and unstable  Continue care with physiatry and tizanidine as prescribed by Dr. Kady Rowell  - ketorolac (TORADOL) injection 30 mg    4. Paronychia of left middle finger  New and unstable  Soak in warm water and Epson salts and follow-up if symptoms worsen despite treatment  - sulfamethoxazole-trimethoprim (BACTRIM DS) 800-160 MG tablet; Take one pill twice a day for ten days  Dispense: 20 Tablet; Refill: 0         Followup: 3 months to check hemoglobin A1c and if elevated blood pressure would recommend starting lisinopril to help with both kidney function and lower blood pressure    Please note that this dictation was created using voice recognition software. I have made every reasonable attempt to correct obvious errors, but I expect that there are errors of grammar and possibly content that I did not discover before finalizing the note.

## 2022-03-16 ENCOUNTER — HOSPITAL ENCOUNTER (OUTPATIENT)
Facility: MEDICAL CENTER | Age: 42
End: 2022-03-16
Attending: PHYSICIAN ASSISTANT
Payer: COMMERCIAL

## 2022-03-16 ENCOUNTER — OFFICE VISIT (OUTPATIENT)
Dept: URGENT CARE | Facility: PHYSICIAN GROUP | Age: 42
End: 2022-03-16
Payer: COMMERCIAL

## 2022-03-16 VITALS
OXYGEN SATURATION: 97 % | BODY MASS INDEX: 37.28 KG/M2 | DIASTOLIC BLOOD PRESSURE: 78 MMHG | RESPIRATION RATE: 19 BRPM | HEIGHT: 68 IN | HEART RATE: 93 BPM | TEMPERATURE: 97.5 F | SYSTOLIC BLOOD PRESSURE: 120 MMHG | WEIGHT: 246 LBS

## 2022-03-16 DIAGNOSIS — J06.9 UPPER RESPIRATORY TRACT INFECTION, UNSPECIFIED TYPE: ICD-10-CM

## 2022-03-16 DIAGNOSIS — H65.92 LEFT OTITIS MEDIA WITH EFFUSION: ICD-10-CM

## 2022-03-16 PROCEDURE — 99214 OFFICE O/P EST MOD 30 MIN: CPT | Performed by: PHYSICIAN ASSISTANT

## 2022-03-16 PROCEDURE — U0003 INFECTIOUS AGENT DETECTION BY NUCLEIC ACID (DNA OR RNA); SEVERE ACUTE RESPIRATORY SYNDROME CORONAVIRUS 2 (SARS-COV-2) (CORONAVIRUS DISEASE [COVID-19]), AMPLIFIED PROBE TECHNIQUE, MAKING USE OF HIGH THROUGHPUT TECHNOLOGIES AS DESCRIBED BY CMS-2020-01-R: HCPCS

## 2022-03-16 PROCEDURE — U0005 INFEC AGEN DETEC AMPLI PROBE: HCPCS

## 2022-03-16 RX ORDER — DEXTROMETHORPHAN HYDROBROMIDE AND PROMETHAZINE HYDROCHLORIDE 15; 6.25 MG/5ML; MG/5ML
5 SYRUP ORAL EVERY 4 HOURS PRN
Qty: 120 ML | Refills: 0 | Status: SHIPPED
Start: 2022-03-16 | End: 2022-09-12

## 2022-03-16 RX ORDER — CEFDINIR 300 MG/1
300 CAPSULE ORAL 2 TIMES DAILY
Qty: 10 CAPSULE | Refills: 0 | Status: SHIPPED | OUTPATIENT
Start: 2022-03-16 | End: 2022-03-21

## 2022-03-16 RX ORDER — CEFDINIR 300 MG/1
300 CAPSULE ORAL 2 TIMES DAILY
Qty: 14 CAPSULE | Refills: 0 | Status: SHIPPED
Start: 2022-03-16 | End: 2022-03-16

## 2022-03-16 RX ORDER — PREDNISONE 20 MG/1
20 TABLET ORAL DAILY
Qty: 5 TABLET | Refills: 0 | Status: SHIPPED | OUTPATIENT
Start: 2022-03-16 | End: 2022-03-21

## 2022-03-16 ASSESSMENT — ENCOUNTER SYMPTOMS
HEADACHES: 1
SORE THROAT: 1
COUGH: 1

## 2022-03-16 NOTE — LETTER
March 16, 2022    To Whom It May Concern:         This is confirmation that Caridad Danisha Mica attended her scheduled appointment with Mario Parr P.A.-C. on 3/16/22. Please excuse her from work this week.          If you have any questions please do not hesitate to call me at the phone number listed below.    Sincerely,          Mario Parr P.A.-C.  [phone]

## 2022-03-17 DIAGNOSIS — J06.9 UPPER RESPIRATORY TRACT INFECTION, UNSPECIFIED TYPE: ICD-10-CM

## 2022-03-17 LAB — COVID ORDER STATUS COVID19: NORMAL

## 2022-03-17 NOTE — PROGRESS NOTES
Subjective:   Caridad Nino is a 41 y.o. female who presents for Pharyngitis (Pt has a sore throat, she feels she is congested and has a headache. Been going on since Monday.)        Patient presents for evaluation of severe nasal congestion, mild sore throat, headaches, malaise, fatigue as well as changes in taste and smell for the last 3 days.  Overall symptoms have been worsening.  She was evaluated in another urgent care on Monday and instructed to start Robitussin.  Despite this symptoms have continued to worsen.  She denies fevers and chills.  No diarrhea.  No known exposure to COVID-19.  She is not taking any other medications for symptoms.  She is having a difficult time sleeping at night.      Review of Systems   Constitutional: Positive for malaise/fatigue.   HENT: Positive for congestion and sore throat.    Respiratory: Positive for cough.    Neurological: Positive for headaches.       PMH:  has a past medical history of Cervical radiculopathy (1/18/2022), Dental disorder, Hemorrhoids (1998), History of shingles (June/July 2016), Hypertension (1997), Hypertension due to endocrine disorder (8/22/2019), Morbid obesity due to excess calories ([facility]) (12/3/2016), Normocytic anemia (8/15/2017), Pneumonia, Snoring, Thrombocytopenia ([facility]) (8/14/2017), Thyroid nodule, Type 2 diabetes mellitus without complication, without long-term current use of insulin ([facility]), and Vertigo (8/14/2017).    She has no past medical history of Addisons disease ([facility]), Allergy, Anesthesia, Anginal syndrome ([facility]), Anxiety, Arrhythmia, Arthritis, Blood clotting disorder ([facility]), Blood transfusion without reported diagnosis, Bowel habit changes, Breath shortness, Carcinoma in situ of respiratory system, Cataract, Clotting disorder ([facility]), Cold, Continuous ambulatory peritoneal dialysis status ([facility]), Coughing blood, Cushings syndrome ([facility]), Depression, Diabetic neuropathy ([facility]), Dialysis patient ([facility]), Encounter for long-term  (current) use of other medications, GERD (gastroesophageal reflux disease), Glaucoma, Gynecological disorder, Heart attack (HCC), Heart murmur, Heart valve disease, Hemorrhagic disorder (HCC), Hepatitis A, Hepatitis B, Hepatitis C, Hiatus hernia syndrome, High cholesterol, HIV (human immunodeficiency virus infection) (HCC), Hyperlipidemia, IBD (inflammatory bowel disease), Indigestion, Jaundice, Meningitis, Migraine, Osteoporosis, Pacemaker, Parathyroid disorder (HCC), Pituitary disease (HCC), Pulmonary emphysema (HCC), Rheumatic fever, Seizure (HCC), Sickle cell disease (HCC), Sleep apnea, Substance abuse (HCC), Tuberculosis, Urinary bladder disorder, or Urinary incontinence.  MEDS:   Current Outpatient Medications:   •  predniSONE (DELTASONE) 20 MG Tab, Take 1 Tablet by mouth every day for 5 days., Disp: 5 Tablet, Rfl: 0  •  promethazine-dextromethorphan (PROMETHAZINE-DM) 6.25-15 MG/5ML syrup, Take 5 mL by mouth every four hours as needed for Cough., Disp: 120 mL, Rfl: 0  •  cefdinir (OMNICEF) 300 MG Cap, Take 1 Capsule by mouth 2 times a day for 5 days., Disp: 10 Capsule, Rfl: 0  •  metFORMIN (GLUCOPHAGE) 500 MG Tab, Take 1 Tablet by mouth 2 times a day with meals., Disp: 60 Tablet, Rfl: 3  •  tizanidine (ZANAFLEX) 2 MG tablet, Take 1-2 Tablets by mouth at bedtime as needed (muscle spasms)., Disp: 60 Tablet, Rfl: 2  •  sulfamethoxazole-trimethoprim (BACTRIM DS) 800-160 MG tablet, Take one pill twice a day for ten days, Disp: 20 Tablet, Rfl: 0  ALLERGIES:   Allergies   Allergen Reactions   • Amoxicillin      SURGHX:   Past Surgical History:   Procedure Laterality Date   • CHOLECYSTECTOMY  1998     SOCHX:  reports that she quit smoking about 5 years ago. Her smoking use included cigarettes. She started smoking about 33 years ago. She has a 27.00 pack-year smoking history. She has never used smokeless tobacco. She reports current alcohol use. She reports that she does not use drugs.  FH: Family history was reviewed,  "no pertinent findings to report   Objective:   /78   Pulse 93   Temp 36.4 °C (97.5 °F)   Resp 19   Ht 1.727 m (5' 8\")   Wt 112 kg (246 lb)   SpO2 97%   BMI 37.40 kg/m²   Physical Exam  Vitals reviewed.   Constitutional:       General: She is not in acute distress.     Appearance: Normal appearance. She is well-developed. She is not toxic-appearing.   HENT:      Head: Normocephalic and atraumatic.      Right Ear: Ear canal and external ear normal.      Left Ear: Ear canal and external ear normal. A middle ear effusion (large meniscus of white fluid behind tm ) is present. Tympanic membrane is erythematous and bulging.      Nose: Congestion and rhinorrhea present. Rhinorrhea is clear.      Mouth/Throat:      Lips: Pink.      Mouth: Mucous membranes are moist.      Pharynx: Oropharynx is clear. Uvula midline. Posterior oropharyngeal erythema present.   Cardiovascular:      Rate and Rhythm: Normal rate and regular rhythm.      Heart sounds: Normal heart sounds, S1 normal and S2 normal.   Pulmonary:      Effort: Pulmonary effort is normal. No respiratory distress.      Breath sounds: Normal breath sounds. No stridor. No decreased breath sounds, wheezing, rhonchi or rales.   Skin:     General: Skin is dry.   Neurological:      Comments: Alert and oriented.    Psychiatric:         Speech: Speech normal.         Behavior: Behavior normal.           Assessment/Plan:   1. Upper respiratory tract infection, unspecified type  - promethazine-dextromethorphan (PROMETHAZINE-DM) 6.25-15 MG/5ML syrup; Take 5 mL by mouth every four hours as needed for Cough.  Dispense: 120 mL; Refill: 0  - SARS-CoV-2 PCR (24 hour In-House): Collect NP swab in [facility]; Future    2. Left otitis media with effusion  - predniSONE (DELTASONE) 20 MG Tab; Take 1 Tablet by mouth every day for 5 days.  Dispense: 5 Tablet; Refill: 0  - cefdinir (OMNICEF) 300 MG Cap; Take 1 Capsule by mouth 2 times a day for 5 days.  Dispense: 10 Capsule; Refill: " 0      1. Discussed with patient signs and symptoms most likely are due to a viral etiology.     Test for COVID-19. We will call/message back for results and appropriate further instructions. Instructed to sign up for MyChart if they have not already. Result will be released to [employer] application.   Symptomatic and supportive care:   Plenty of oral hydration and rest   Over the counter cough suppressant as directed.  Tylenol or ibuprofen for pain and fever as directed.   Saline nasal spray and Flonase as a decongestant.   Infection control measures at home. Stay away from people, Hand washing, covering sneeze/cough, disinfect surfaces.   Remain home from work, school, and other populated environments. Work note provided with information of quarantine measures and CDC guidelines.     2. Pt instructed to complete full course of medication despite symptomatic improvement. Pt to take med with meals to alleviate GI upset. R/B/SE of prednisone discussed. Pt consents to tx with this medication.    Differential diagnosis, natural history, supportive care, and indications for immediate follow-up discussed.

## 2022-03-17 NOTE — PROGRESS NOTES
Subjective:   Caridad Nino is a 41 y.o. female who presents for Pharyngitis (Pt has a sore throat, she feels she is congested and has a headache. Been going on since Monday.)        Nasal congestion  Mild sore throat  Headache  No diarrhea  Has been on robitussin.    Was seen in clinic- post nasal drip    Pharyngitis       ROS    PMH:  has a past medical history of Cervical radiculopathy (1/18/2022), Dental disorder, Hemorrhoids (1998), History of shingles (June/July 2016), Hypertension (1997), Hypertension due to endocrine disorder (8/22/2019), Morbid obesity due to excess calories ([facility]) (12/3/2016), Normocytic anemia (8/15/2017), Pneumonia, Snoring, Thrombocytopenia ([facility]) (8/14/2017), Thyroid nodule, Type 2 diabetes mellitus without complication, without long-term current use of insulin ([facility]), and Vertigo (8/14/2017).    She has no past medical history of Addisons disease ([facility]), Allergy, Anesthesia, Anginal syndrome ([facility]), Anxiety, Arrhythmia, Arthritis, Blood clotting disorder ([facility]), Blood transfusion without reported diagnosis, Bowel habit changes, Breath shortness, Carcinoma in situ of respiratory system, Cataract, Clotting disorder ([facility]), Cold, Continuous ambulatory peritoneal dialysis status ([facility]), Coughing blood, Cushings syndrome ([facility]), Depression, Diabetic neuropathy ([facility]), Dialysis patient ([facility]), Encounter for long-term (current) use of other medications, GERD (gastroesophageal reflux disease), Glaucoma, Gynecological disorder, Heart attack ([facility]), Heart murmur, Heart valve disease, Hemorrhagic disorder ([facility]), Hepatitis A, Hepatitis B, Hepatitis C, Hiatus hernia syndrome, High cholesterol, HIV (human immunodeficiency virus infection) ([facility]), Hyperlipidemia, IBD (inflammatory bowel disease), Indigestion, Jaundice, Meningitis, Migraine, Osteoporosis, Pacemaker, Parathyroid disorder ([facility]), Pituitary disease ([facility]), Pulmonary emphysema ([facility]), Rheumatic fever, Seizure ([facility]), Sickle cell disease ([facility]),  "Sleep apnea, Substance abuse (HCC), Tuberculosis, Urinary bladder disorder, or Urinary incontinence.  MEDS:   Current Outpatient Medications:   •  cefdinir (OMNICEF) 300 MG Cap, Take 1 Capsule by mouth 2 times a day for 7 days., Disp: 14 Capsule, Rfl: 0  •  predniSONE (DELTASONE) 20 MG Tab, Take 1 Tablet by mouth every day for 5 days., Disp: 5 Tablet, Rfl: 0  •  promethazine-dextromethorphan (PROMETHAZINE-DM) 6.25-15 MG/5ML syrup, Take 5 mL by mouth every four hours as needed for Cough., Disp: 120 mL, Rfl: 0  •  metFORMIN (GLUCOPHAGE) 500 MG Tab, Take 1 Tablet by mouth 2 times a day with meals., Disp: 60 Tablet, Rfl: 3  •  tizanidine (ZANAFLEX) 2 MG tablet, Take 1-2 Tablets by mouth at bedtime as needed (muscle spasms)., Disp: 60 Tablet, Rfl: 2  •  sulfamethoxazole-trimethoprim (BACTRIM DS) 800-160 MG tablet, Take one pill twice a day for ten days, Disp: 20 Tablet, Rfl: 0  ALLERGIES:   Allergies   Allergen Reactions   • Amoxicillin      SURGHX:   Past Surgical History:   Procedure Laterality Date   • CHOLECYSTECTOMY  1998     SOCHX:  reports that she quit smoking about 5 years ago. Her smoking use included cigarettes. She started smoking about 33 years ago. She has a 27.00 pack-year smoking history. She has never used smokeless tobacco. She reports current alcohol use. She reports that she does not use drugs.  FH: Family history was reviewed, no pertinent findings to report   Objective:   /78   Pulse 93   Temp 36.4 °C (97.5 °F)   Resp 19   Ht 1.727 m (5' 8\")   Wt 112 kg (246 lb)   SpO2 97%   BMI 37.40 kg/m²   Physical Exam      Assessment/Plan:   1. Upper respiratory tract infection, unspecified type  - promethazine-dextromethorphan (PROMETHAZINE-DM) 6.25-15 MG/5ML syrup; Take 5 mL by mouth every four hours as needed for Cough.  Dispense: 120 mL; Refill: 0  - SARS-CoV-2 PCR (24 hour In-House): Collect NP swab in [facility]; Future    2. Left otitis media with effusion  - cefdinir (OMNICEF) 300 MG Cap; Take 1 " Capsule by mouth 2 times a day for 7 days.  Dispense: 14 Capsule; Refill: 0  - predniSONE (DELTASONE) 20 MG Tab; Take 1 Tablet by mouth every day for 5 days.  Dispense: 5 Tablet; Refill: 0    Differential diagnosis, natural history, supportive care, and indications for immediate follow-up discussed.

## 2022-03-18 LAB
SARS-COV-2 RNA RESP QL NAA+PROBE: NOTDETECTED
SPECIMEN SOURCE: NORMAL

## 2022-04-15 ENCOUNTER — NON-PROVIDER VISIT (OUTPATIENT)
Dept: NEUROLOGY | Facility: MEDICAL CENTER | Age: 42
End: 2022-04-15
Attending: SPECIALIST
Payer: COMMERCIAL

## 2022-04-15 DIAGNOSIS — R20.2 NUMBNESS AND TINGLING IN BOTH HANDS: ICD-10-CM

## 2022-04-15 DIAGNOSIS — M79.10 MYALGIA: ICD-10-CM

## 2022-04-15 DIAGNOSIS — R20.0 NUMBNESS AND TINGLING IN BOTH HANDS: ICD-10-CM

## 2022-04-15 DIAGNOSIS — M54.2 CERVICALGIA: ICD-10-CM

## 2022-04-15 PROCEDURE — 95910 NRV CNDJ TEST 7-8 STUDIES: CPT | Mod: 26 | Performed by: SPECIALIST

## 2022-04-15 PROCEDURE — 95886 MUSC TEST DONE W/N TEST COMP: CPT | Mod: 26 | Performed by: SPECIALIST

## 2022-04-15 PROCEDURE — 95910 NRV CNDJ TEST 7-8 STUDIES: CPT | Performed by: SPECIALIST

## 2022-04-15 PROCEDURE — 95886 MUSC TEST DONE W/N TEST COMP: CPT | Performed by: SPECIALIST

## 2022-04-22 ENCOUNTER — OFFICE VISIT (OUTPATIENT)
Dept: PHYSICAL MEDICINE AND REHAB | Facility: MEDICAL CENTER | Age: 42
End: 2022-04-22
Payer: COMMERCIAL

## 2022-04-22 VITALS
TEMPERATURE: 98 F | BODY MASS INDEX: 38.96 KG/M2 | SYSTOLIC BLOOD PRESSURE: 128 MMHG | HEART RATE: 95 BPM | DIASTOLIC BLOOD PRESSURE: 80 MMHG | WEIGHT: 257.06 LBS | HEIGHT: 68 IN | OXYGEN SATURATION: 95 %

## 2022-04-22 DIAGNOSIS — G56.03 BILATERAL CARPAL TUNNEL SYNDROME: Primary | ICD-10-CM

## 2022-04-22 DIAGNOSIS — R20.0 NUMBNESS AND TINGLING IN BOTH HANDS: ICD-10-CM

## 2022-04-22 DIAGNOSIS — R20.2 NUMBNESS AND TINGLING IN BOTH HANDS: ICD-10-CM

## 2022-04-22 DIAGNOSIS — Z13.31 POSITIVE DEPRESSION SCREENING: ICD-10-CM

## 2022-04-22 PROCEDURE — 99214 OFFICE O/P EST MOD 30 MIN: CPT | Performed by: STUDENT IN AN ORGANIZED HEALTH CARE EDUCATION/TRAINING PROGRAM

## 2022-04-22 RX ORDER — CYCLOBENZAPRINE HCL 10 MG
10 TABLET ORAL NIGHTLY PRN
Qty: 30 TABLET | Refills: 5 | Status: SHIPPED | OUTPATIENT
Start: 2022-04-22 | End: 2023-02-14

## 2022-04-22 RX ORDER — MELOXICAM 15 MG/1
15 TABLET ORAL DAILY
Qty: 30 TABLET | Refills: 0 | Status: SHIPPED | OUTPATIENT
Start: 2022-04-22 | End: 2022-05-22

## 2022-04-22 ASSESSMENT — PATIENT HEALTH QUESTIONNAIRE - PHQ9
CLINICAL INTERPRETATION OF PHQ2 SCORE: 3
5. POOR APPETITE OR OVEREATING: 1 - SEVERAL DAYS
SUM OF ALL RESPONSES TO PHQ QUESTIONS 1-9: 10

## 2022-04-22 ASSESSMENT — PAIN SCALES - GENERAL: PAINLEVEL: 10=SEVERE PAIN

## 2022-04-22 NOTE — PATIENT INSTRUCTIONS
Stop ibuprofen and start mobic  Wear wrist splints at night  See a hand surgeon to discuss carpal tunnel release surgery.  Start flexeril, stop tizanidine.

## 2022-04-22 NOTE — PROGRESS NOTES
"Follow-up patient Note    Interventional Pain and Spine  Physiatry (Physical Medicine and Rehabilitation)     Patient Name: Caridad Nino  : 1980  Date of Service: 2022      Chief Complaint:   Chief Complaint   Patient presents with   • Follow-Up     Numbness and tingling in both hands       HISTORY (2/15/22)  Caridad Nino is a 41 y.o. RHD female who presents today with painful muscle tightness in bilateral paracervical muscles, upper trapezius, and posterior shoulders, tightness and squeezing in bilateral arms, and constant painful numbness and tingling in both hands, worst at 3rd and 4th fingers bilaterally (R>L). States these symptoms have been chronic for years but recently worsened after she stopped smoking marijuana.     Her pain at its best-worse level during the course of the day is 7-8/10, respectively. Pain right now is 7/10 on the numeric pain scale. Pain is constant and worsens with wrist compression and improves with moving her arms. Painful numbness wakes her up at night. The patient has difficulty washing dishes, picking up objects due to pain and numbness in hands; she otherwise denies new weakness, numbness, or bladder/bowel incontinence.      Of note she states she was previously \"always high\" from marijuana so she was less aware of her pain. 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 Denies previous history of carpal tunnel syndrome or significant pain radiating from neck into hands.      The patient has not done physical therapy for this problem     Patient has tried the following medications with varied success (current meds in bold):   flexeril 5mg. Denies SE of drowsiness. Helps for 1 hr.   Naproxen  Gabapentin - no relief     Therapeutic modalities and interventional therapies " to date include:  -no injections     Medical history includes BMI 37, T2DM, thrombocytopenia, MRI evidence of cervical neuroforaminal stenosis.    HPI  Today's visit   Caridad Nino ( 1980) is a female with The primary encounter diagnosis was Bilateral carpal tunnel syndrome. Diagnoses of Numbness and tingling in both hands and Positive depression screening were also pertinent to this visit.    EMG obtained since last visit shows severe carpal tunnel syndrome on the right and moderate carpal tunnel syndrome on the left.     Symptoms unchanged since last visit. Constant numbness and tingling in bilateral hands, most notable in bilateral 3rd fingers.  Pain rates 8/10 in severity.  Continues to work in [employer].    Has not started PT. Endorses time constraints with taking time off of work and taking care of her son.  Does not think that she would be able to do hand therapy or physical therapy at this time.    Started tizanidine without significant relief.     Pt denies new numbness, tingling, or weakness.      ROS:   Red Flags ROS:   Fever, Chills, Sweats: Denies  Involuntary Weight Loss: Denies  Bladder Incontinence: Denies  Bowel Incontinence: denies  Saddle Anesthesia: Denies    All other systems reviewed and negative.     PMHx:   Past Medical History:   Diagnosis Date   • Cervical radiculopathy 2022   • Dental disorder    • Hemorrhoids    • History of shingles 2016   • Hypertension     JUST DURING PREGNANCY   • Hypertension due to endocrine disorder 2019   • Morbid obesity due to excess calories (HCC) 12/3/2016   • Normocytic anemia 8/15/2017   • Pneumonia    • Snoring    • Thrombocytopenia ([facility]) 2017   • Thyroid nodule    • Type 2 diabetes mellitus without complication, without long-term current use of insulin ([facility])    • Vertigo 2017       PSHx:   Past Surgical History:   Procedure Laterality Date   • CHOLECYSTECTOMY         Family Hx:   Family History    Problem Relation Age of Onset   • Diabetes Mother    • Other Mother         MS   • Cancer Mother         colon   • Cancer Sister         throat   • Alcohol abuse Paternal Grandmother        Social Hx:  Social History     Socioeconomic History   • Marital status:      Spouse name: Not on file   • Number of children: Not on file   • Years of education: Not on file   • Highest education level: Not on file   Occupational History   • Not on file   Tobacco Use   • Smoking status: Former Smoker     Packs/day: 1.00     Years: 27.00     Pack years: 27.00     Types: Cigarettes     Start date: 1989     Quit date: 2016     Years since quittin.0   • Smokeless tobacco: Never Used   Vaping Use   • Vaping Use: Never used   Substance and Sexual Activity   • Alcohol use: Yes     Comment: rarely    • Drug use: No   • Sexual activity: Yes     Partners: Male   Other Topics Concern   • Not on file   Social History Narrative   • Not on file     Social Determinants of Health     Financial Resource Strain: Not on file   Food Insecurity: Not on file   Transportation Needs: Not on file   Physical Activity: Not on file   Stress: Not on file   Social Connections: Not on file   Intimate Partner Violence: Not on file   Housing Stability: Not on file       Allergies:  Allergies   Allergen Reactions   • Amoxicillin        Medications: reviewed on epic.   Outpatient Medications Marked as Taking for the 22 encounter (Office Visit) with Laura Lewis M.D.   Medication Sig Dispense Refill   • meloxicam (MOBIC) 15 MG tablet Take 1 Tablet by mouth every day for 30 days. For 30 days then stop. Do not take other NSAIDs. No refills. 30 Tablet 0   • cyclobenzaprine (FLEXERIL) 10 mg Tab Take 1 Tablet by mouth at bedtime as needed for Mild Pain, Moderate Pain or Muscle Spasms. 30 Tablet 5   • metFORMIN (GLUCOPHAGE) 500 MG Tab Take 1 Tablet by mouth 2 times a day with meals. 60 Tablet 3        Current Outpatient Medications on File  "Prior to Visit   Medication Sig Dispense Refill   • metFORMIN (GLUCOPHAGE) 500 MG Tab Take 1 Tablet by mouth 2 times a day with meals. 60 Tablet 3   • promethazine-dextromethorphan (PROMETHAZINE-DM) 6.25-15 MG/5ML syrup Take 5 mL by mouth every four hours as needed for Cough. (Patient not taking: Reported on 4/22/2022) 120 mL 0   • sulfamethoxazole-trimethoprim (BACTRIM DS) 800-160 MG tablet Take one pill twice a day for ten days 20 Tablet 0     No current facility-administered medications on file prior to visit.         EXAMINATION     Physical Exam:   /80 (BP Location: Right arm, Patient Position: Sitting, BP Cuff Size: Adult)   Pulse 95   Temp 36.7 °C (98 °F) (Temporal)   Ht 1.727 m (5' 8\")   Wt 117 kg (257 lb 0.9 oz)   SpO2 95%     Constitutional:   Body Habitus: Body mass index is 39.09 kg/m².  Cooperation: Fully cooperates with exam  Appearance: Well-groomed, well-nourished.    Eyes: No scleral icterus to suggest severe liver disease, no proptosis to suggest severe hyperthyroidism    ENT -no obvious auditory deficits, no noticeable facial droop     Skin -no rashes or lesions noted     Respiratory-  breathing comfortably on room air, no audible wheezing    Cardiovascular-distal extremities warm and well perfused.  No lower extremity edema is noted.     Gastrointestinal - no obvious abdominal masses, non-distended    Psychiatric- alert and oriented ×3. Normal affect.     Gait - normal gait, no use of ambulatory device, nonantalgic.         Musculoskeletal and Neuro -      Bilateral hands:   Inspection: No swelling, deformities, or rashes. Symmetric appearing thenar and hyperthenar regions bilaterally.  Palpation: tenderness to palpation at bilateral volar wrists. Otherwise no significant tenderness to palpation throughout the bilateral hands  Range of motion is within normal limits throughout bilateral hands, fingers and wrist.     Special tests:  Tinel's at the wrist over the median nerve positive on " right, negative on left  Carpal tunnel compression: positive bilaterally  Phalen's test: positive  positive on right, negative on left  Tinel's at the cubital tunnel:  positive on right, negative on left        Cervical spine   Inspection: No deformities of the skin over the cervical spine. No rashes or lesions.     limited active range of motion in all directions     Spurling's sign  negative bilaterally  Cervical facet loading maneuver  negative bilaterally but reproduces pain at contralateral upper trapezius and paracervical muscle     No signs of muscular atrophy in bilateral upper extremities      Tenderness to palpation at paracervical muscles bilaterally and upper trapezius bilaterally with palpable myofascial trigger points. No tenderness to palpation elsewhere including midline of cervical spine and cervical facets bilaterally.     Key points for the international standards for neurological classification of spinal cord injury (ISNCSCI) to light touch.      Dermatome R L   C4 2 2   C5 2 2   C6 2 2   C7 1 1   C8 2 2   T1 2 2   T2 2 2         Motor Exam Upper Extremities   ? Myotome R L   Shoulder abduction C5 5 5   Elbow flexion C5 5 5   Wrist extension C6 5 5   Elbow extension C7 5 5   Finger flexion C8 5 5   Finger abduction T1 5 5      Previous exam  Reflexes  ?   R L   Biceps   2+ 2+   Brachioradialis   2+ 2+      Wills's sign negative bilaterally         MEDICAL DECISION MAKING    Medical records review: see under HPI section.     DATA  EMG 4/15/22  DIAGNOSTIC INTERPRETATION:   Extensive electrodiagnostic studies were performed to the bilateral upper extremities.  The results are as follows:     1.  Acute denervation changes noted in the right deltoid muscle with increased insertional activity and fibrillation potentials.  This is suggestive of a C5 radiculopathy.  Clinical correlation is suggested.     2.  Right carpal tunnel syndrome which is severe electrophysiologically and associated with acute  denervation changes in median nerve supplied hand muscles on the right.     3.  Left carpal tunnel syndrome which is moderate electrophysiologically and not associated with motor unit changes in median nerve supplied hand muscles.     4.  Normal ulnar nerve sensory and motor conduction studies bilaterally.     5.  No evidence of radiculopathy in selected muscle studied left upper extremity.       Labs: Personally reviewed at today's visit:     Lab Results   Component Value Date/Time    SODIUM 136 02/18/2022 10:16 AM    POTASSIUM 4.2 02/18/2022 10:16 AM    CHLORIDE 99 02/18/2022 10:16 AM    CO2 26 02/18/2022 10:16 AM    ANION 11.0 02/18/2022 10:16 AM    GLUCOSE 193 (H) 02/18/2022 10:16 AM    BUN 13 02/18/2022 10:16 AM    CREATININE 0.60 02/18/2022 10:16 AM    CALCIUM 9.9 02/18/2022 10:16 AM    ASTSGOT 30 02/18/2022 10:16 AM    ALTSGPT 37 02/18/2022 10:16 AM    TBILIRUBIN 0.4 02/18/2022 10:16 AM    ALBUMIN 4.5 02/18/2022 10:16 AM    TOTPROTEIN 7.4 02/18/2022 10:16 AM    GLOBULIN 2.9 02/18/2022 10:16 AM    AGRATIO 1.6 02/18/2022 10:16 AM       Lab Results   Component Value Date/Time    PROTHROMBTM 13.0 08/14/2017 01:17 PM    INR 0.95 08/14/2017 01:17 PM        Lab Results   Component Value Date/Time    WBC 7.8 08/08/2019 06:43 PM    RBC 4.83 08/08/2019 06:43 PM    HEMOGLOBIN 13.7 08/08/2019 06:43 PM    HEMATOCRIT 44.7 08/08/2019 06:43 PM    MCV 92.5 08/08/2019 06:43 PM    MCH 28.4 08/08/2019 06:43 PM    MCHC 30.6 (L) 08/08/2019 06:43 PM    MPV 12.4 08/08/2019 06:43 PM    NEUTSPOLYS 53.40 08/08/2019 06:43 PM    LYMPHOCYTES 36.50 08/08/2019 06:43 PM    MONOCYTES 8.10 08/08/2019 06:43 PM    EOSINOPHILS 1.20 08/08/2019 06:43 PM    BASOPHILS 0.50 08/08/2019 06:43 PM        Lab Results   Component Value Date/Time    HBA1C 8.7 (A) 01/18/2022 01:36 PM        Imaging:   I personally reviewed following images, these are my reads  MRI cervical spine 8/21/19  Disc bulge at C5-6 and C6-7 resulting in mild neural foraminal stenosis  at C5-6 and mild-moderate neuroforaminal stenosis at C6-7.  Mild central canal stenosis at C6-7.     IMAGING radiology reads. I reviewed the following radiology reads    Results for orders placed in visit on 19     MR-CERVICAL SPINE-W/O     Addendum 2019 10:44 AM  Addendum:  At the level of C5-6 there is right paracentral disc protrusion causing effacement of the right lateral recess. There is indentation of the right side of the spinal cord contour at this level. There is mild right C6 neural foraminal stenosis.     Impression  1.  Degenerative disease in the cervical spine as described above.  2.  There is loss of cervical lordosis with kyphotic angulation at C6. This may be positional in nature.  3.  There is an approximately 1.3 x 1 cm sized nodule in the right lobe of the thyroid. Ultrasound examination is recommended for further evaluation.                                                    Diagnosis  Visit Diagnoses     ICD-10-CM   1. Bilateral carpal tunnel syndrome  G56.03   2. Numbness and tingling in both hands  R20.0    R20.2   3. Positive depression screening  Z13.31         ASSESSMENT AND PLAN:  Caridad Nino (: 1980) is a female with history of BMI 37, T2DM, thrombocytopenia, MRI evidence of cervical neuroforaminal stenosis who presents with pain at her bilateral paracervical muscles and upper trapezius reproducible with palpation suggestive of myalgias and myofascial pain as well as constant numbness and tingling in bilateral hands with EMG noting severe right carpal tunnel syndrome and moderate left carpal tunnel syndrome.      She has neg Spurlings and denies pain radiating from her neck down to her arms, making active symptoms of cervical radiculopathy less likely.     Caridad was seen today for follow-up.    Diagnoses and all orders for this visit:    Bilateral carpal tunnel syndrome  -     Referral to Hand Surgery    Numbness and tingling in both hands  -      Referral to Hand Surgery    Positive depression screening  -     Patient has been identified as having a positive depression screening. Appropriate orders and counseling have been given.    Other orders  -     meloxicam (MOBIC) 15 MG tablet; Take 1 Tablet by mouth every day for 30 days. For 30 days then stop. Do not take other NSAIDs. No refills.  -     cyclobenzaprine (FLEXERIL) 10 mg Tab; Take 1 Tablet by mouth at bedtime as needed for Mild Pain, Moderate Pain or Muscle Spasms.          PLAN  Physical Therapy: I discussed hand therapy referral, pt declined due to time constraints. Pt previously referred to physical therapy and states she cannot go due to time constraints.     Diagnostic workup: no further imaging needed at this time. Reviewed and discussed EMG results at today's visit.     Medications:  - DC tizanidine given no relief  - start trial of flexeril 5-10mg QHS given no improvement in pain or sleep with tizanidine  - start short course 30-day of meloxicam 15mg daily  - advised pt to discontinue ibuprofen while taking meloxicam.     Interventions: Discussed possible trigger point injections and median nerve steroid injection under ultrasound guidance which she would like to defer at this time.      Referrals: to hand surgery to discuss carpal tunnel release which pt states she is interested in    Other  - advised pt to obtain neutral wrist splints to wear at night    Follow-up: Return in about 2 months (around 6/22/2022). or earlier as needed    Orders Placed This Encounter   • Referral to Hand Surgery   • Patient has been identified as having a positive depression screening. Appropriate orders and counseling have been given.   • meloxicam (MOBIC) 15 MG tablet   • cyclobenzaprine (FLEXERIL) 10 mg Tab       Laura Lewis MD  Interventional Pain and Spine  Physical Medicine and Rehabilitation  [facility]      The above note documents my personal evaluation of this patient. In addition, I have  reviewed and confirmed with the patient and MA the supportive information documented in today's Patient Health Questionnaire and Office Note.     Please note that this dictation was created using voice recognition software. I have made every reasonable attempt to correct obvious errors, but I expect that there are errors of grammar and possibly content that I did not discover before finalizing the note.

## 2022-06-24 ENCOUNTER — APPOINTMENT (OUTPATIENT)
Dept: PHYSICAL MEDICINE AND REHAB | Facility: MEDICAL CENTER | Age: 42
End: 2022-06-24
Payer: COMMERCIAL

## 2022-06-26 ENCOUNTER — HOSPITAL ENCOUNTER (OUTPATIENT)
Facility: MEDICAL CENTER | Age: 42
End: 2022-06-26
Attending: NURSE PRACTITIONER
Payer: COMMERCIAL

## 2022-06-26 ENCOUNTER — OFFICE VISIT (OUTPATIENT)
Dept: URGENT CARE | Facility: PHYSICIAN GROUP | Age: 42
End: 2022-06-26
Payer: COMMERCIAL

## 2022-06-26 ENCOUNTER — HOSPITAL ENCOUNTER (OUTPATIENT)
Dept: RADIOLOGY | Facility: MEDICAL CENTER | Age: 42
End: 2022-06-26
Attending: NURSE PRACTITIONER
Payer: COMMERCIAL

## 2022-06-26 VITALS
OXYGEN SATURATION: 92 % | SYSTOLIC BLOOD PRESSURE: 122 MMHG | WEIGHT: 258 LBS | RESPIRATION RATE: 16 BRPM | BODY MASS INDEX: 39.1 KG/M2 | HEIGHT: 68 IN | HEART RATE: 106 BPM | TEMPERATURE: 98.6 F | DIASTOLIC BLOOD PRESSURE: 76 MMHG

## 2022-06-26 DIAGNOSIS — Z20.822 SUSPECTED COVID-19 VIRUS INFECTION: ICD-10-CM

## 2022-06-26 DIAGNOSIS — R05.9 COUGH IN ADULT: ICD-10-CM

## 2022-06-26 DIAGNOSIS — R06.02 SHORTNESS OF BREATH: ICD-10-CM

## 2022-06-26 DIAGNOSIS — R79.81 BORDERLINE LOW O2 SATURATION: ICD-10-CM

## 2022-06-26 DIAGNOSIS — R68.89 FLU-LIKE SYMPTOMS: ICD-10-CM

## 2022-06-26 DIAGNOSIS — Z20.822 SUSPECTED COVID-19 VIRUS INFECTION: Primary | ICD-10-CM

## 2022-06-26 LAB
EXTERNAL QUALITY CONTROL: NORMAL
FLUAV+FLUBV AG SPEC QL IA: NEGATIVE
INT CON NEG: NORMAL
INT CON POS: NORMAL
SARS-COV+SARS-COV-2 AG RESP QL IA.RAPID: NEGATIVE

## 2022-06-26 PROCEDURE — 71046 X-RAY EXAM CHEST 2 VIEWS: CPT

## 2022-06-26 PROCEDURE — 99214 OFFICE O/P EST MOD 30 MIN: CPT | Mod: CS | Performed by: NURSE PRACTITIONER

## 2022-06-26 PROCEDURE — U0003 INFECTIOUS AGENT DETECTION BY NUCLEIC ACID (DNA OR RNA); SEVERE ACUTE RESPIRATORY SYNDROME CORONAVIRUS 2 (SARS-COV-2) (CORONAVIRUS DISEASE [COVID-19]), AMPLIFIED PROBE TECHNIQUE, MAKING USE OF HIGH THROUGHPUT TECHNOLOGIES AS DESCRIBED BY CMS-2020-01-R: HCPCS

## 2022-06-26 PROCEDURE — 87804 INFLUENZA ASSAY W/OPTIC: CPT | Performed by: NURSE PRACTITIONER

## 2022-06-26 PROCEDURE — U0005 INFEC AGEN DETEC AMPLI PROBE: HCPCS

## 2022-06-26 PROCEDURE — 87426 SARSCOV CORONAVIRUS AG IA: CPT | Performed by: NURSE PRACTITIONER

## 2022-06-26 RX ORDER — CODEINE PHOSPHATE AND GUAIFENESIN 10; 100 MG/5ML; MG/5ML
5 SOLUTION ORAL EVERY 4 HOURS PRN
Qty: 100 ML | Refills: 0 | Status: SHIPPED | OUTPATIENT
Start: 2022-06-26 | End: 2022-07-01

## 2022-06-26 RX ORDER — BENZONATATE 100 MG/1
100 CAPSULE ORAL 3 TIMES DAILY PRN
Qty: 60 CAPSULE | Refills: 0 | Status: SHIPPED | OUTPATIENT
Start: 2022-06-26 | End: 2022-09-12

## 2022-06-26 RX ORDER — PREDNISONE 20 MG/1
20 TABLET ORAL DAILY
Qty: 7 TABLET | Refills: 0 | Status: SHIPPED | OUTPATIENT
Start: 2022-06-26 | End: 2022-07-03

## 2022-06-26 RX ORDER — ALBUTEROL SULFATE 90 UG/1
2 AEROSOL, METERED RESPIRATORY (INHALATION) EVERY 4 HOURS PRN
Qty: 2 EACH | Refills: 0 | Status: SHIPPED | OUTPATIENT
Start: 2022-06-26 | End: 2022-07-10

## 2022-06-26 RX ORDER — MELOXICAM 15 MG/1
15 TABLET ORAL DAILY
COMMUNITY
End: 2023-02-14

## 2022-06-26 ASSESSMENT — ENCOUNTER SYMPTOMS
DIARRHEA: 0
RHINORRHEA: 1
NAUSEA: 0
FEVER: 0
COUGH: 1
CHILLS: 0
MYALGIAS: 1
SPUTUM PRODUCTION: 1
SHORTNESS OF BREATH: 1
WHEEZING: 1
SORE THROAT: 0
VOMITING: 0
HEADACHES: 1

## 2022-06-26 NOTE — LETTER
June 26, 2022    To Whom It May Concern:         This is confirmation that Caridad Nino attended her scheduled appointment with GERMANIA Burnett on 6/26/22.   Your employee was seen in our clinic today and tested positive for COVID 19.     The [facility] department should be contacting you for further instructions on duration of self-isolation and return to work protocol.  In general, this will also follow the CDC guidelines with a minimum of 5 days from the onset of symptoms and without fever, vomiting, or diarrhea for an additional 24 hour period following the 5 day  isolation.      In general, repeat testing is NOT necessary and not offered through the [facility] care.     This is the only note that will be provided from the [facility] for this visit.  Your employee will require an appointment with a primary care provider if FMLA or disability forms are required.         If you have any questions please do not hesitate to call me at the phone number listed below.    Sincerely,          FARRUKH Burnett.  [phone]

## 2022-06-26 NOTE — PROGRESS NOTES
Subjective:     Caridad Nino is a 41 y.o. female who presents for Nasal Congestion (C/o congestion, cough, burning ion chest, fatigue x1week, but got much worse Friday. At home covid test was negative)      URI   This is a new problem. The current episode started in the past 7 days (1 week ago Caridad developed congestion, cough, chest pain and sore throat. Her  and son are ill with similar symptoms). The problem has been gradually worsening. There has been no fever. Associated symptoms include chest pain, congestion, coughing, headaches, rhinorrhea and wheezing. Pertinent negatives include no diarrhea, ear pain, nausea, sore throat or vomiting. Treatments tried: cough drops, vitamin C. The treatment provided mild relief.   She does have a history of Asthma.       Review of Systems   Constitutional: Positive for malaise/fatigue. Negative for chills and fever.   HENT: Positive for congestion and rhinorrhea. Negative for ear pain and sore throat.    Respiratory: Positive for cough, sputum production, shortness of breath and wheezing.    Cardiovascular: Positive for chest pain.   Gastrointestinal: Negative for diarrhea, nausea and vomiting.   Musculoskeletal: Positive for myalgias.   Neurological: Positive for headaches.       PMH:   Past Medical History:   Diagnosis Date   • Cervical radiculopathy 1/18/2022   • Dental disorder    • Hemorrhoids 1998   • History of shingles June/July 2016   • Hypertension 1997    JUST DURING PREGNANCY   • Hypertension due to endocrine disorder 8/22/2019   • Morbid obesity due to excess calories ([facility]) 12/3/2016   • Normocytic anemia 8/15/2017   • Pneumonia    • Snoring    • Thrombocytopenia ([facility]) 8/14/2017   • Thyroid nodule    • Type 2 diabetes mellitus without complication, without long-term current use of insulin ([facility])    • Vertigo 8/14/2017     ALLERGIES:   Allergies   Allergen Reactions   • Amoxicillin      SURGHX:   Past Surgical History:   Procedure Laterality  "Date   • CHOLECYSTECTOMY       SOCHX:   Social History     Socioeconomic History   • Marital status:    Tobacco Use   • Smoking status: Former Smoker     Packs/day: 1.00     Years: 27.00     Pack years: 27.00     Types: Cigarettes     Start date: 1989     Quit date: 2016     Years since quittin.2   • Smokeless tobacco: Never Used   Vaping Use   • Vaping Use: Never used   Substance and Sexual Activity   • Alcohol use: Yes     Comment: rarely    • Drug use: No   • Sexual activity: Yes     Partners: Male     FH:   Family History   Problem Relation Age of Onset   • Diabetes Mother    • Other Mother         MS   • Cancer Mother         colon   • Cancer Sister         throat   • Alcohol abuse Paternal Grandmother          Objective:   /76   Pulse (!) 106   Temp 37 °C (98.6 °F)   Resp 16   Ht 1.727 m (5' 8\")   Wt 117 kg (258 lb)   SpO2 92%   BMI 39.23 kg/m²     Physical Exam  Vitals and nursing note reviewed.   Constitutional:       General: She is not in acute distress.     Appearance: Normal appearance. She is ill-appearing.   HENT:      Head: Normocephalic and atraumatic.      Right Ear: Tympanic membrane, ear canal and external ear normal. There is no impacted cerumen.      Left Ear: Tympanic membrane, ear canal and external ear normal. There is no impacted cerumen.      Nose: Congestion and rhinorrhea present.      Mouth/Throat:      Mouth: Mucous membranes are moist.      Pharynx: Posterior oropharyngeal erythema present. No oropharyngeal exudate.   Eyes:      Extraocular Movements: Extraocular movements intact.      Pupils: Pupils are equal, round, and reactive to light.   Cardiovascular:      Rate and Rhythm: Regular rhythm. Tachycardia present.      Pulses: Normal pulses.      Heart sounds: Normal heart sounds.   Pulmonary:      Effort: Pulmonary effort is normal. No respiratory distress.      Breath sounds: No stridor. Examination of the right-upper field reveals wheezing. " Examination of the left-upper field reveals wheezing. Wheezing present. No rhonchi or rales.   Chest:      Chest wall: No tenderness.   Abdominal:      General: Abdomen is flat. Bowel sounds are normal.      Palpations: Abdomen is soft.      Tenderness: There is no abdominal tenderness. There is no right CVA tenderness or left CVA tenderness.   Musculoskeletal:         General: Normal range of motion.      Cervical back: Normal range of motion and neck supple. No tenderness.   Lymphadenopathy:      Cervical: Cervical adenopathy present.   Skin:     General: Skin is warm and dry.      Capillary Refill: Capillary refill takes less than 2 seconds.   Neurological:      General: No focal deficit present.      Mental Status: She is alert and oriented to person, place, and time. Mental status is at baseline.   Psychiatric:         Mood and Affect: Mood normal.         Behavior: Behavior normal.         Thought Content: Thought content normal.         Judgment: Judgment normal.     POCT flu:negative  POCT COVID:negative  DX Chest:   IMPRESSION:     No evidence of acute cardiopulmonary process.    Assessment/Plan:   Assessment    1. Suspected COVID-19 virus infection  POCT SARS-COV Antigen ESTRELLA (Symptomatic only)    POCT Influenza A/B    DX-CHEST-2 VIEWS    SARS-CoV-2 PCR (24 hour In-House): Collect NP swab in [facility]   2. Flu-like symptoms  POCT SARS-COV Antigen ESTRELLA (Symptomatic only)    POCT Influenza A/B    DX-CHEST-2 VIEWS   3. Shortness of breath  DX-CHEST-2 VIEWS    albuterol 108 (90 Base) MCG/ACT Aero Soln inhalation aerosol   4. Cough in adult  DX-CHEST-2 VIEWS    albuterol 108 (90 Base) MCG/ACT Aero Soln inhalation aerosol    predniSONE (DELTASONE) 20 MG Tab    benzonatate (TESSALON) 100 MG Cap    guaifenesin-codeine (ROBITUSSIN AC) Solution oral solution   5. Borderline low O2 saturation  DX-CHEST-2 VIEWS    albuterol 108 (90 Base) MCG/ACT Aero Soln inhalation aerosol      did test positive for COVID today  although she tested negative.  PCR COVID out to lab for analysis.  Isolation guidelines reviewed.  She was prescribed codeine/guaifenesin for relief of cough.  Side effects of codeine discussed with patient. We discussed supportive measures including humidifier, warm salt water gargles, over-the-counter Cepacol throat lozenges, rest  and increased fluids. Pt was encouraged to seek treatment back in the ER or urgent care for worsening symptoms,  fever greater than 100.5, wheezes or shortness of breath.    AVS handout given and reviewed with patient. Pt educated on red flags and when to seek treatment back in ER or UC.

## 2022-06-27 DIAGNOSIS — Z20.822 SUSPECTED COVID-19 VIRUS INFECTION: ICD-10-CM

## 2022-06-27 LAB
COVID ORDER STATUS COVID19: NORMAL
SARS-COV-2 RNA RESP QL NAA+PROBE: NOTDETECTED
SPECIMEN SOURCE: NORMAL

## 2022-07-09 DIAGNOSIS — E11.65 TYPE 2 DIABETES MELLITUS WITH HYPERGLYCEMIA, WITHOUT LONG-TERM CURRENT USE OF INSULIN (HCC): ICD-10-CM

## 2022-07-09 NOTE — LETTER
July 12, 2022        Caridad Nino  [address]  [place] [address]        Dear Caridad:    Our records indicate that you are due for your next  in-clinic visit in August. Please give us a call at [phone] and our scheduling team will assist you with scheduling this appointment.       If you have any questions or concerns, please don't hesitate to call.        Sincerely,        Ivania Prince P.A.-C.    Electronically Signed

## 2022-07-12 NOTE — TELEPHONE ENCOUNTER
Please call patient and let them know they need to come in in the next 4 to 8 weeks for evaluation to check their sugars and diabetes management

## 2022-08-07 DIAGNOSIS — E11.65 TYPE 2 DIABETES MELLITUS WITH HYPERGLYCEMIA, WITHOUT LONG-TERM CURRENT USE OF INSULIN (HCC): ICD-10-CM

## 2022-09-12 ENCOUNTER — OFFICE VISIT (OUTPATIENT)
Dept: MEDICAL GROUP | Facility: MEDICAL CENTER | Age: 42
End: 2022-09-12
Payer: COMMERCIAL

## 2022-09-12 VITALS
OXYGEN SATURATION: 97 % | DIASTOLIC BLOOD PRESSURE: 82 MMHG | TEMPERATURE: 97.3 F | HEIGHT: 68 IN | HEART RATE: 111 BPM | SYSTOLIC BLOOD PRESSURE: 112 MMHG | BODY MASS INDEX: 38.77 KG/M2 | WEIGHT: 255.8 LBS

## 2022-09-12 DIAGNOSIS — K62.5 BRBPR (BRIGHT RED BLOOD PER RECTUM): ICD-10-CM

## 2022-09-12 DIAGNOSIS — E11.65 TYPE 2 DIABETES MELLITUS WITH HYPERGLYCEMIA, WITHOUT LONG-TERM CURRENT USE OF INSULIN (HCC): ICD-10-CM

## 2022-09-12 DIAGNOSIS — G56.01 CARPAL TUNNEL SYNDROME OF RIGHT WRIST: ICD-10-CM

## 2022-09-12 PROCEDURE — 99214 OFFICE O/P EST MOD 30 MIN: CPT | Performed by: PHYSICIAN ASSISTANT

## 2022-09-12 NOTE — PROGRESS NOTES
Subjective:     Chief Complaint   Patient presents with    Diabetes     Caridad Nino is a 42 y.o. female here today to follow to Discuss:    Carpal tunnel syndrome of right wrist  Chronic and unstable  Surgery will be 10/14       Type 2 diabetes  Patient has been taking metformin 500 mg twice daily but states she has not been eating well and feels her sugars are likely higher.  She is having trouble exercising and losing weight    Current medicines (including changes today)  Current Outpatient Medications   Medication Sig Dispense Refill    Semaglutide,0.25 or 0.5MG/DOS, 2 MG/1.5ML Solution Pen-injector Inject 0.25mg once weekly for four weeks and then repeat. May then go up to 0.5mg once weekly thereafter 1.5 mL 5    metFORMIN (GLUCOPHAGE) 500 MG Tab TAKE ONE TABLET BY MOUTH TWICE DAILY WITH MEALS 60 Tablet 0    meloxicam (MOBIC) 15 MG tablet Take 15 mg by mouth every day.      cyclobenzaprine (FLEXERIL) 10 mg Tab Take 1 Tablet by mouth at bedtime as needed for Mild Pain, Moderate Pain or Muscle Spasms. 30 Tablet 5     No current facility-administered medications for this visit.     She  has a past medical history of Cervical radiculopathy (1/18/2022), Dental disorder, Hemorrhoids (1998), History of shingles (June/July 2016), Hypertension (1997), Hypertension due to endocrine disorder (8/22/2019), Morbid obesity due to excess calories ([facility]) (12/3/2016), Normocytic anemia (8/15/2017), Pneumonia, Snoring, Thrombocytopenia ([facility]) (8/14/2017), Thyroid nodule, Type 2 diabetes mellitus without complication, without long-term current use of insulin ([facility]), and Vertigo (8/14/2017).    She has no past medical history of Addisons disease ([facility]), Allergy, Anesthesia, Anginal syndrome ([facility]), Anxiety, Arrhythmia, Arthritis, Blood clotting disorder ([facility]), Blood transfusion without reported diagnosis, Bowel habit changes, Breath shortness, Carcinoma in situ of respiratory system, Cataract, Clotting disorder ([facility]), Cold,  "Continuous ambulatory peritoneal dialysis status (HCC), Coughing blood, Cushings syndrome (HCC), Depression, Diabetic neuropathy (HCC), Dialysis patient (HCC), Encounter for long-term (current) use of other medications, GERD (gastroesophageal reflux disease), Glaucoma, Gynecological disorder, Heart attack (HCC), Heart murmur, Heart valve disease, Hemorrhagic disorder (HCC), Hepatitis A, Hepatitis B, Hepatitis C, Hiatus hernia syndrome, High cholesterol, HIV (human immunodeficiency virus infection) (HCC), Hyperlipidemia, IBD (inflammatory bowel disease), Indigestion, Jaundice, Meningitis, Migraine, Osteoporosis, Pacemaker, Parathyroid disorder (HCC), Pituitary disease (HCC), Pulmonary emphysema (HCC), Rheumatic fever, Seizure (HCC), Sickle cell disease (HCC), Sleep apnea, Substance abuse (HCC), Tuberculosis, Urinary bladder disorder, or Urinary incontinence.    ROS  As per listed in the HPI, otherwise negative     Objective:     /82   Pulse (!) 111   Temp 36.3 °C (97.3 °F) (Temporal)   Ht 1.727 m (5' 8\")   Wt 116 kg (255 lb 12.8 oz)   SpO2 97%  Body mass index is 38.89 kg/m².     Physical Exam:  General: Patient appears well-nourished, well-hydrated, nontoxic  HEENT, normocephalic atraumatic, PERRLA, extraocular movements intact, nares are patent and clear  Neck: No visible masses, thyromegaly or abnormalities noted  Cardiovascular.  Sitting comfortably without visible signs of edema  Lungs: No cyanosis noted, nondyspneic  Skin: Well perfused without evidence of rash or lesions  Neurological: Cranial nerves II through XII intact, normal gait  Musculoskeletal: Normal range of motion, normal strength and no deficit noted       Assessment and Plan:   The following treatment plan was discussed and signs and symptoms for which to return were discussed with patient.  Patient verbalized understanding.    1. Carpal tunnel syndrome of right wrist  New and unstable  Patient is set to have surgical repair on October " 14 with Dr. Sharp    2. Type 2 diabetes mellitus with hyperglycemia, without long-term current use of insulin (HCC)  Chronic and unstable  Ordering labs and adding Ozempic to her regimen.  She needs to follow-up with pharmacotherapy services as well.  She does not check her sugars regularly and discussed the importance of this  - Semaglutide,0.25 or 0.5MG/DOS, 2 MG/1.5ML Solution Pen-injector; Inject 0.25mg once weekly for four weeks and then repeat. May then go up to 0.5mg once weekly thereafter  Dispense: 1.5 mL; Refill: 5  - HEMOGLOBIN A1C; Future  - Comp Metabolic Panel; Future  - Referral to Pharmacotherapy Service    3. BRBPR (bright red blood per rectum)  Mentioned that she was leaving  New and unstable  May be related to hemorrhoid.  She states she had 1 bout and it stopped.  I will order CBC and follow up at next visit  - CBC WITH DIFFERENTIAL; Future       Followup: 1 month to go over labs and to see how she is doing on Ozempic          Please note that this dictation was created using voice recognition software. I have made every reasonable attempt to correct obvious errors, but I expect that there are errors of grammar and possibly content that I did not discover before finalizing the note.

## 2022-09-13 ENCOUNTER — TELEPHONE (OUTPATIENT)
Dept: VASCULAR LAB | Facility: MEDICAL CENTER | Age: 42
End: 2022-09-13
Payer: COMMERCIAL

## 2022-09-13 ENCOUNTER — HOSPITAL ENCOUNTER (OUTPATIENT)
Dept: LAB | Facility: MEDICAL CENTER | Age: 42
End: 2022-09-13
Attending: PHYSICIAN ASSISTANT
Payer: COMMERCIAL

## 2022-09-13 DIAGNOSIS — K62.5 BRBPR (BRIGHT RED BLOOD PER RECTUM): ICD-10-CM

## 2022-09-13 DIAGNOSIS — E11.65 TYPE 2 DIABETES MELLITUS WITH HYPERGLYCEMIA, WITHOUT LONG-TERM CURRENT USE OF INSULIN (HCC): ICD-10-CM

## 2022-09-13 LAB
ALBUMIN SERPL BCP-MCNC: 4 G/DL (ref 3.2–4.9)
ALBUMIN/GLOB SERPL: 1.4 G/DL
ALP SERPL-CCNC: 78 U/L (ref 30–99)
ALT SERPL-CCNC: 52 U/L (ref 2–50)
ANION GAP SERPL CALC-SCNC: 10 MMOL/L (ref 7–16)
AST SERPL-CCNC: 38 U/L (ref 12–45)
BASOPHILS # BLD AUTO: 0.5 % (ref 0–1.8)
BASOPHILS # BLD: 0.03 K/UL (ref 0–0.12)
BILIRUB SERPL-MCNC: 0.5 MG/DL (ref 0.1–1.5)
BUN SERPL-MCNC: 13 MG/DL (ref 8–22)
CALCIUM SERPL-MCNC: 9 MG/DL (ref 8.5–10.5)
CHLORIDE SERPL-SCNC: 99 MMOL/L (ref 96–112)
CO2 SERPL-SCNC: 27 MMOL/L (ref 20–33)
CREAT SERPL-MCNC: 0.67 MG/DL (ref 0.5–1.4)
EOSINOPHIL # BLD AUTO: 0.11 K/UL (ref 0–0.51)
EOSINOPHIL NFR BLD: 1.8 % (ref 0–6.9)
ERYTHROCYTE [DISTWIDTH] IN BLOOD BY AUTOMATED COUNT: 48.3 FL (ref 35.9–50)
EST. AVERAGE GLUCOSE BLD GHB EST-MCNC: 249 MG/DL
GFR SERPLBLD CREATININE-BSD FMLA CKD-EPI: 110 ML/MIN/1.73 M 2
GLOBULIN SER CALC-MCNC: 2.9 G/DL (ref 1.9–3.5)
GLUCOSE SERPL-MCNC: 265 MG/DL (ref 65–99)
HBA1C MFR BLD: 10.3 % (ref 4–5.6)
HCT VFR BLD AUTO: 43.5 % (ref 37–47)
HGB BLD-MCNC: 13.7 G/DL (ref 12–16)
IMM GRANULOCYTES # BLD AUTO: 0.04 K/UL (ref 0–0.11)
IMM GRANULOCYTES NFR BLD AUTO: 0.6 % (ref 0–0.9)
LYMPHOCYTES # BLD AUTO: 2.1 K/UL (ref 1–4.8)
LYMPHOCYTES NFR BLD: 33.5 % (ref 22–41)
MCH RBC QN AUTO: 28.7 PG (ref 27–33)
MCHC RBC AUTO-ENTMCNC: 31.5 G/DL (ref 33.6–35)
MCV RBC AUTO: 91 FL (ref 81.4–97.8)
MONOCYTES # BLD AUTO: 0.5 K/UL (ref 0–0.85)
MONOCYTES NFR BLD AUTO: 8 % (ref 0–13.4)
NEUTROPHILS # BLD AUTO: 3.48 K/UL (ref 2–7.15)
NEUTROPHILS NFR BLD: 55.6 % (ref 44–72)
NRBC # BLD AUTO: 0 K/UL
NRBC BLD-RTO: 0 /100 WBC
PLATELET # BLD AUTO: 155 K/UL (ref 164–446)
PMV BLD AUTO: 12.6 FL (ref 9–12.9)
POTASSIUM SERPL-SCNC: 4.4 MMOL/L (ref 3.6–5.5)
PROT SERPL-MCNC: 6.9 G/DL (ref 6–8.2)
RBC # BLD AUTO: 4.78 M/UL (ref 4.2–5.4)
SODIUM SERPL-SCNC: 136 MMOL/L (ref 135–145)
WBC # BLD AUTO: 6.3 K/UL (ref 4.8–10.8)

## 2022-09-13 PROCEDURE — 83036 HEMOGLOBIN GLYCOSYLATED A1C: CPT

## 2022-09-13 PROCEDURE — 85025 COMPLETE CBC W/AUTO DIFF WBC: CPT

## 2022-09-13 PROCEDURE — 36415 COLL VENOUS BLD VENIPUNCTURE: CPT

## 2022-09-13 PROCEDURE — 80053 COMPREHEN METABOLIC PANEL: CPT

## 2022-09-13 NOTE — TELEPHONE ENCOUNTER
[facility]    Received DM referral from Dr. Ivania Prince, PSOHAN. on 9/13    Scheduled NP for 9/27  Pt is interested in CGM and will be starting Ozempic tomorrow     Insurance: [employer]  PCP: [facility]  Locations to be seen: [place]    [facility] at [phone], fax [phone]    Jud Bhagat, PharmD

## 2022-09-27 ENCOUNTER — NON-PROVIDER VISIT (OUTPATIENT)
Dept: VASCULAR LAB | Facility: MEDICAL CENTER | Age: 42
End: 2022-09-27
Attending: INTERNAL MEDICINE
Payer: COMMERCIAL

## 2022-09-27 DIAGNOSIS — E11.65 TYPE 2 DIABETES MELLITUS WITH HYPERGLYCEMIA, WITHOUT LONG-TERM CURRENT USE OF INSULIN (HCC): ICD-10-CM

## 2022-09-27 PROCEDURE — 99213 OFFICE O/P EST LOW 20 MIN: CPT

## 2022-09-27 RX ORDER — FLASH GLUCOSE SENSOR
KIT MISCELLANEOUS
Qty: 6 EACH | Refills: 3 | Status: SHIPPED | OUTPATIENT
Start: 2022-09-27

## 2022-09-27 NOTE — PROGRESS NOTES
Patient Consult Note      Primary care physician: Ivania Prince P.A.-C.    Reason for consult: Management of Uncontrolled Type 2 Diabetes    HPI:  Caridad Nino is a 42 y.o. old patient who comes in today for evaluation of above stated problem.    Most Recent HbA1c and POCT glucose:   Lab Results   Component Value Date/Time    HBA1C 10.3 (H) 09/13/2022 10:11 AM            Most Recent Scr:  Lab Results   Component Value Date/Time    CREATININE 0.67 09/13/2022 10:11 AM        Current Diabetes Medication Regimen  Metformin: IR Metformin 1000 mg daily   GLP-1 Agent: Ozempic 0.25 mg qweekly - started about 3 weeks ago      Previous Diabetes Medications and Reason for Discontinuation  Jardiance - unclear why patient stopped    Pt has home glucometer and proper testing technique - has meter few years old, interested in freestyle cortes. Has not tested blood sugar in about a month.     Hypoglycemia awareness - educated  Nocturnal hypoglycemia- none  Hypoglycemia:  None    Pt's treatment of Hypoglycemia - educated  - 15:15 Rule    Current Exercise - walking son to school, walking at work  Exercise Goal - 30 minutes of moderate intensity exercise 5 days per week    Dietary  Breakfast - skips breakfast, coffee w/ creamer  Lunch - fast food, hot dogs, sandwiches  Dinner - spaghetti, tacos, chicken stir rao, pork chops  Snacks - hot tamales, M&m's   Drinks - Diet coke, ice tea w/ sweet and low    Foot Exam:  Monofilament exam - completed today  Monofilament testing with a 10 gram force: sensation intact: decreased bilaterally.    Visual Inspection: Feet without maceration, ulcers, fissures.  Feet dry.  Pedal pulses: intact bilaterally  -patient complaining of foot pain on top and side of feet, patient to continue to monitor    Preventative Management  BP regimen (ACE/ARB) - none  ASA - none  Statin - none  Last Retinal Scan - Address at next visit  Last Foot Exam - Next due 9/2023  Last A1c -   Lab Results   Component  Value Date/Time    HBA1C 10.3 (H) 2022 10:11 AM      Last Microalbuminuria - Order and address at next visit    updated caregaps    Past Medical History:  Patient Active Problem List    Diagnosis Date Noted    Carpal tunnel syndrome of right wrist 2022    Paronychia of left middle finger 2022    Cervical radiculopathy 2022    Dermatitis 2022    Other hemorrhoids 2019    Snoring 2019    Normocytic anemia 08/15/2017    Thrombocytopenia (HCC) 2017    Type 2 diabetes mellitus with hyperglycemia, without long-term current use of insulin (HCC) 2017    Tendinitis of wrist 2017    Morbid obesity due to excess calories (HCC) 2016       Past Surgical History:  Past Surgical History:   Procedure Laterality Date    CHOLECYSTECTOMY         Allergies:  Amoxicillin    Social History:  Social History     Socioeconomic History    Marital status:      Spouse name: Not on file    Number of children: Not on file    Years of education: Not on file    Highest education level: Not on file   Occupational History    Not on file   Tobacco Use    Smoking status: Former     Packs/day: 1.00     Years: 27.00     Pack years: 27.00     Types: Cigarettes     Start date: 1989     Quit date: 2016     Years since quittin.4    Smokeless tobacco: Never   Vaping Use    Vaping Use: Never used   Substance and Sexual Activity    Alcohol use: Yes     Comment: rarely     Drug use: No    Sexual activity: Yes     Partners: Male   Other Topics Concern    Not on file   Social History Narrative    Not on file     Social Determinants of Health     Financial Resource Strain: Not on file   Food Insecurity: Not on file   Transportation Needs: Not on file   Physical Activity: Not on file   Stress: Not on file   Social Connections: Not on file   Intimate Partner Violence: Not on file   Housing Stability: Not on file       Family History:  Family History   Problem Relation Age of  Onset    Diabetes Mother     Other Mother         MS    Cancer Mother         colon    Cancer Sister         throat    Alcohol abuse Paternal Grandmother        Medications:    Current Outpatient Medications:     Semaglutide,0.25 or 0.5MG/DOS, 2 MG/1.5ML Solution Pen-injector, Inject 0.25mg once weekly for four weeks and then repeat. May then go up to 0.5mg once weekly thereafter, Disp: 1.5 mL, Rfl: 5    metFORMIN (GLUCOPHAGE) 500 MG Tab, TAKE ONE TABLET BY MOUTH TWICE DAILY WITH MEALS, Disp: 60 Tablet, Rfl: 0    meloxicam (MOBIC) 15 MG tablet, Take 15 mg by mouth every day., Disp: , Rfl:     cyclobenzaprine (FLEXERIL) 10 mg Tab, Take 1 Tablet by mouth at bedtime as needed for Mild Pain, Moderate Pain or Muscle Spasms., Disp: 30 Tablet, Rfl: 5    Labs: Reviewed    Physical Examination:  Vital signs: There were no vitals taken for this visit. There is no height or weight on file to calculate BMI.    Assessment and Plan:    1. DM2  Basic physiology of DMII was explained to patient as well as microvascular/macrovascular complications. The importance of increasing physical activity to improve diabetes control was discussed with the patient. Patient was also educated on changing diet and making better choices to help control blood sugar.   Discussed Goals: FBG 80 - 130, 2hPP < 180, a1c < 7.0%   Patient not testing blood sugars at home, requests blood sugar monitor that does not require finger prick. Freestyle cortes ordered. Patient to test FBG daily.   Tolerating Ozempic without issues, on 3rd week of injections. Affordable with $25 copay.  Patient previously on 2000 mg of metformin but stopped due to lapse in PCP and has been resumed by Dr Prince. Continue to titrate.   Microalbumin labs and retinal scan due - order/address at next visit   Continue to make diet modifications    - Medication changes:  Increase Metformin to 500 mg AM, 1000 mg nightly  Increase Ozempic to 0.5mg weekly (after 4 doses of 0.25mg)     -  Lifestyle changes:  Continue walking with son to school and at work  Substitute hot tamales (candy) snacks for nuts (peanuts, cashews, almonds, pistachios)    Follow Up:  4 weeks    David HendrixD    CC:   MARTIN Mensah MD

## 2022-10-10 ENCOUNTER — OFFICE VISIT (OUTPATIENT)
Dept: MEDICAL GROUP | Facility: MEDICAL CENTER | Age: 42
End: 2022-10-10
Payer: COMMERCIAL

## 2022-10-10 VITALS
BODY MASS INDEX: 38.4 KG/M2 | HEART RATE: 96 BPM | TEMPERATURE: 97 F | WEIGHT: 253.4 LBS | OXYGEN SATURATION: 96 % | SYSTOLIC BLOOD PRESSURE: 128 MMHG | HEIGHT: 68 IN | DIASTOLIC BLOOD PRESSURE: 80 MMHG

## 2022-10-10 DIAGNOSIS — G56.01 CARPAL TUNNEL SYNDROME OF RIGHT WRIST: ICD-10-CM

## 2022-10-10 DIAGNOSIS — E11.65 TYPE 2 DIABETES MELLITUS WITH HYPERGLYCEMIA, WITHOUT LONG-TERM CURRENT USE OF INSULIN (HCC): ICD-10-CM

## 2022-10-10 DIAGNOSIS — E78.2 MIXED HYPERLIPIDEMIA: ICD-10-CM

## 2022-10-10 PROCEDURE — 99214 OFFICE O/P EST MOD 30 MIN: CPT | Performed by: PHYSICIAN ASSISTANT

## 2022-10-10 ASSESSMENT — FIBROSIS 4 INDEX: FIB4 SCORE: 1.5

## 2022-10-10 NOTE — ASSESSMENT & PLAN NOTE
Chronic and unstable  Ozempic was added at last visit at 0.25 mg.  Tolerating well.  We will continue at this dose for 1 month more and will likely increase.  I am going to increase metformin to 1000 twice daily is currently she takes 500 every morning and 1000 every afternoon

## 2022-10-10 NOTE — ASSESSMENT & PLAN NOTE
Chronic and unstable  Discussed LDL goal of less than 70 in diabetics.  Patient not interested in statin at this time

## 2022-10-10 NOTE — PROGRESS NOTES
Subjective:   Caridad Nino is a 42 y.o. female here today for     HPI: Patient is here to discuss:  Problem   Mixed Hyperlipidemia    February 2022   Latest Reference Range & Units 2/18/22 10:16   Cholesterol,Tot 100 - 199 mg/dL 186   Triglycerides 0 - 149 mg/dL 191 (H)   HDL >=40 mg/dL 39 !   LDL <100 mg/dL 109 (H)   (H): Data is abnormally high  !: Data is abnormal     Carpal Tunnel Syndrome of Right Wrist    Patient uses a brace and takes Mobic as needed and having surgical repair in 4 days     Type 2 Diabetes Mellitus With Hyperglycemia, Without Long-Term Current Use of Insulin ([facility])    1/18/22: hbga1c 8.7 October 2022 hemoglobin A1c was 10.s  She has been on Ozempic for 1 month and is tolerating.  Can also takes month metformin 500 mg every morning and 1000 every afternoon without nausea or diarrhea              Current medicines (including changes today)  Current Outpatient Medications   Medication Sig Dispense Refill    Continuous Blood Gluc Sensor (FREESTYLE MAXWELL 14 DAY SENSOR) Misc Apply one sensor every 14 days 6 Each 3    metFORMIN (GLUCOPHAGE) 500 MG Tab Take 500 mg PO AM and 1000 mg PO  Tablet 3    Semaglutide,0.25 or 0.5MG/DOS, 2 MG/1.5ML Solution Pen-injector Inject 0.25mg once weekly for four weeks and then repeat. May then go up to 0.5mg once weekly thereafter 1.5 mL 5    meloxicam (MOBIC) 15 MG tablet Take 15 mg by mouth every day.      cyclobenzaprine (FLEXERIL) 10 mg Tab Take 1 Tablet by mouth at bedtime as needed for Mild Pain, Moderate Pain or Muscle Spasms. 30 Tablet 5     No current facility-administered medications for this visit.     She  has a past medical history of Cervical radiculopathy (1/18/2022), Dental disorder, Hemorrhoids (1998), History of shingles (June/July 2016), Hypertension (1997), Hypertension due to endocrine disorder (8/22/2019), Mixed hyperlipidemia (10/10/2022), Morbid obesity due to excess calories (HCC) (12/3/2016), Normocytic anemia (8/15/2017),  "Pneumonia, Snoring, Thrombocytopenia (HCC) (8/14/2017), Thyroid nodule, Type 2 diabetes mellitus without complication, without long-term current use of insulin (HCC), and Vertigo (8/14/2017).    She has no past medical history of Addisons disease (HCC), Allergy, Anesthesia, Anginal syndrome (HCC), Anxiety, Arrhythmia, Arthritis, Blood clotting disorder (HCC), Blood transfusion without reported diagnosis, Bowel habit changes, Breath shortness, Carcinoma in situ of respiratory system, Cataract, Clotting disorder (HCC), Cold, Continuous ambulatory peritoneal dialysis status (HCC), Coughing blood, Cushings syndrome (HCC), Depression, Diabetic neuropathy (HCC), Dialysis patient (HCC), Encounter for long-term (current) use of other medications, GERD (gastroesophageal reflux disease), Glaucoma, Gynecological disorder, Heart attack (HCC), Heart murmur, Heart valve disease, Hemorrhagic disorder (HCC), Hepatitis A, Hepatitis B, Hepatitis C, Hiatus hernia syndrome, High cholesterol, HIV (human immunodeficiency virus infection) (HCC), IBD (inflammatory bowel disease), Indigestion, Jaundice, Meningitis, Migraine, Osteoporosis, Pacemaker, Parathyroid disorder (HCC), Pituitary disease (HCC), Pulmonary emphysema (HCC), Rheumatic fever, Seizure (HCC), Sickle cell disease (HCC), Sleep apnea, Substance abuse (HCC), Tuberculosis, Urinary bladder disorder, or Urinary incontinence.  Amoxicillin     Social History and Family History were reviewed and updated.    ROS   No headaches, chest pain, no shortness of breath, abdominal pain, nausea, or vomiting.  All other systems were reviewed and are negative or noted as positive in the HPI.       Objective:     /80   Pulse 96   Temp 36.1 °C (97 °F) (Temporal)   Ht 1.727 m (5' 8\")   Wt 115 kg (253 lb 6.4 oz)   SpO2 96%  Body mass index is 38.53 kg/m².     Physical Exam:  General: Patient appears well-nourished, well-hydrated, nontoxic  HEENT, normocephalic atraumatic, PERRLA, " extraocular movements intact, nares are patent and clear  Neck: No visible masses, thyromegaly or abnormalities noted  Cardiovascular.  Sitting comfortably without visible signs of edema  Lungs: No cyanosis noted, nondyspneic  Skin: Well perfused without evidence of rash or lesions  Neurological: Cranial nerves II through XII intact, normal gait  Musculoskeletal: Normal range of motion, normal strength and no deficit noted     Clinical Course/Lab Analysis:     Latest Reference Range & Units 9/13/22 10:11   WBC 4.8 - 10.8 K/uL 6.3   RBC 4.20 - 5.40 M/uL 4.78   Hemoglobin 12.0 - 16.0 g/dL 13.7   Hematocrit 37.0 - 47.0 % 43.5   MCV 81.4 - 97.8 fL 91.0   MCH 27.0 - 33.0 pg 28.7   MCHC 33.6 - 35.0 g/dL 31.5 (L)   RDW 35.9 - 50.0 fL 48.3   Platelet Count 164 - 446 K/uL 155 (L)   MPV 9.0 - 12.9 fL 12.6   Neutrophils-Polys 44.00 - 72.00 % 55.60   Neutrophils (Absolute) 2.00 - 7.15 K/uL 3.48   Lymphocytes 22.00 - 41.00 % 33.50   Lymphs (Absolute) 1.00 - 4.80 K/uL 2.10   Monocytes 0.00 - 13.40 % 8.00   Monos (Absolute) 0.00 - 0.85 K/uL 0.50   Eosinophils 0.00 - 6.90 % 1.80   Eos (Absolute) 0.00 - 0.51 K/uL 0.11   Basophils 0.00 - 1.80 % 0.50   Baso (Absolute) 0.00 - 0.12 K/uL 0.03   Immature Granulocytes 0.00 - 0.90 % 0.60   Immature Granulocytes (abs) 0.00 - 0.11 K/uL 0.04   Nucleated RBC /100 WBC 0.00   NRBC (Absolute) K/uL 0.00   Sodium 135 - 145 mmol/L 136   Potassium 3.6 - 5.5 mmol/L 4.4   Chloride 96 - 112 mmol/L 99   Co2 20 - 33 mmol/L 27   Anion Gap 7.0 - 16.0  10.0   Glucose 65 - 99 mg/dL 265 (H)   Bun 8 - 22 mg/dL 13   Creatinine 0.50 - 1.40 mg/dL 0.67   GFR (CKD-EPI) >60 mL/min/1.73 m 2 110   Calcium 8.5 - 10.5 mg/dL 9.0   AST(SGOT) 12 - 45 U/L 38   ALT(SGPT) 2 - 50 U/L 52 (H)   Alkaline Phosphatase 30 - 99 U/L 78   Total Bilirubin 0.1 - 1.5 mg/dL 0.5   Albumin 3.2 - 4.9 g/dL 4.0   Total Protein 6.0 - 8.2 g/dL 6.9   Globulin 1.9 - 3.5 g/dL 2.9   A-G Ratio g/dL 1.4   Glycohemoglobin 4.0 - 5.6 % 10.3 (H)   Estim. Avg  Glu mg/dL 249   (L): Data is abnormally low  (H): Data is abnormally high    Assessment and Plan:   The following treatment plan was discussed.  Signs and symptoms for which to return were discussed with patient at length.  Patient verbalized understanding.    Problem List Items Addressed This Visit       Type 2 diabetes mellitus with hyperglycemia, without long-term current use of insulin (HCC)     Chronic and unstable  Ozempic was added at last visit at 0.25 mg.  Tolerating well.  We will continue at this dose for 1 month more and will likely increase.  I am going to increase metformin to 1000 twice daily is currently she takes 500 every morning and 1000 every afternoon         Relevant Orders    Microalbumin Creat Ratio Urine - Clinic Collect    Referral to Ophthalmology    Carpal tunnel syndrome of right wrist     Chronic and unstable  Surgery will be 10/14         Mixed hyperlipidemia     Chronic and unstable  Discussed LDL goal of less than 70 in diabetics.  Patient not interested in statin at this time               Followup: 3 months    Please note that this dictation was created using voice recognition software. I have made every reasonable attempt to correct obvious errors, but I expect that there are errors of grammar and possibly content that I did not discover before finalizing the note.

## 2022-10-25 ENCOUNTER — DOCUMENTATION (OUTPATIENT)
Dept: VASCULAR LAB | Facility: MEDICAL CENTER | Age: 42
End: 2022-10-25
Payer: COMMERCIAL

## 2022-10-26 NOTE — PROGRESS NOTES
[facility] for [facility]      Called patient as she no-showed her follow up pharmacotherapy visit today.  Patient apologized as she just forgot.  She rescheduled for next week 11/3 @ 10am.     Emilio HernandezD

## 2022-11-04 ENCOUNTER — TELEPHONE (OUTPATIENT)
Dept: VASCULAR LAB | Facility: MEDICAL CENTER | Age: 42
End: 2022-11-04
Payer: COMMERCIAL

## 2022-11-04 NOTE — TELEPHONE ENCOUNTER
Called pt regarding missed pharmacotherapy appt - no answer. LVM asking pt to c/b to reschedule.     Will f/u at a later time.    Carl Omalley, DavidD, BCACP     Spoke to patient's mother Pinky.  She would like to know why a healthy chelsi would have this problem with low white count? Please advise.    Patient did see Dr. Lindsay in consultation and Dar will be making a follow up appointment with you to discuss letter and maybe medications, etc.

## 2022-11-11 ENCOUNTER — TELEPHONE (OUTPATIENT)
Dept: VASCULAR LAB | Facility: MEDICAL CENTER | Age: 42
End: 2022-11-11
Payer: COMMERCIAL

## 2022-11-12 NOTE — TELEPHONE ENCOUNTER
Called pt regarding missed pharmacotherapy appt - pt not ready to schedule f/u at the time of my call.     Will f/u at a later time.     Carl Omalley, PharmD, BCACP

## 2022-11-18 ENCOUNTER — TELEPHONE (OUTPATIENT)
Dept: VASCULAR LAB | Facility: MEDICAL CENTER | Age: 42
End: 2022-11-18
Payer: COMMERCIAL

## 2022-11-18 NOTE — TELEPHONE ENCOUNTER
Called pt regarding missed pharmacotherapy appt - pt declines f/u at this time.    Pt states she will call when she is ready for f/u.    Will await pt contact.    Carl Omalley, DavidD, BCACP

## 2023-02-14 ENCOUNTER — OFFICE VISIT (OUTPATIENT)
Dept: URGENT CARE | Facility: PHYSICIAN GROUP | Age: 43
End: 2023-02-14
Payer: COMMERCIAL

## 2023-02-14 VITALS
OXYGEN SATURATION: 95 % | WEIGHT: 230 LBS | HEART RATE: 90 BPM | RESPIRATION RATE: 14 BRPM | HEIGHT: 67 IN | SYSTOLIC BLOOD PRESSURE: 140 MMHG | TEMPERATURE: 98.4 F | BODY MASS INDEX: 36.1 KG/M2 | DIASTOLIC BLOOD PRESSURE: 80 MMHG

## 2023-02-14 DIAGNOSIS — J06.9 VIRAL URI WITH COUGH: ICD-10-CM

## 2023-02-14 LAB
FLUAV RNA SPEC QL NAA+PROBE: NEGATIVE
FLUBV RNA SPEC QL NAA+PROBE: NEGATIVE
RSV RNA SPEC QL NAA+PROBE: NEGATIVE
S PYO DNA SPEC NAA+PROBE: NOT DETECTED
SARS-COV-2 RNA RESP QL NAA+PROBE: NOT DETECTED

## 2023-02-14 PROCEDURE — 87651 STREP A DNA AMP PROBE: CPT | Performed by: FAMILY MEDICINE

## 2023-02-14 PROCEDURE — 99213 OFFICE O/P EST LOW 20 MIN: CPT | Performed by: FAMILY MEDICINE

## 2023-02-14 PROCEDURE — 0241U POCT CEPHEID COV-2, FLU A/B, RSV - PCR: CPT | Performed by: FAMILY MEDICINE

## 2023-02-14 ASSESSMENT — ENCOUNTER SYMPTOMS
SORE THROAT: 1
MYALGIAS: 1

## 2023-02-14 ASSESSMENT — FIBROSIS 4 INDEX: FIB4 SCORE: 1.5

## 2023-02-14 NOTE — LETTER
February 14, 2023    To Whom It May Concern:         This is confirmation that Caridad Nino attended her scheduled appointment with Santhosh White M.D. on 2/14/23.  She is recovering from an acute illness.  Please excuse her from days missed from work.  She is anticipated to be out of work from 2/13 to 2/15.  Anticipated to be well enough to return to work by either 2/16 or 2/17 depending how fast she recovers.  Thank you for making accommodations as she recovers.         If you have any questions please do not hesitate to call me at the phone number listed below.    Sincerely,          Santhosh White M.D.  [phone]

## 2023-02-15 NOTE — PROGRESS NOTES
Subjective:     Caridad Nino is a 42 y.o. female who presents for Emesis (X1 day; vomited, diarrhea, weakness, throat pain, fatigue, freezing. When burping has nasty taste in mouth that won't go away and usually throws up after. )    HPI  Pt presents for evaluation of an acute problem  Pt with myalgias, fatigue, and chills   Having sore throat which is mild   Has had a few episodes of emesis   Has diarrhea   Patient has no cough or shortness of breath  Son ill with a cough currently     Review of Systems   Constitutional:  Positive for malaise/fatigue.   HENT:  Positive for sore throat.    Musculoskeletal:  Positive for myalgias.     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    She has no past medical history of Addisons disease ([facility]), Allergy, Anesthesia, Anginal syndrome ([facility]), Anxiety, Arrhythmia, Arthritis, Blood clotting disorder ([facility]), Blood transfusion without reported diagnosis, Bowel habit changes, Breath shortness, Carcinoma in situ of respiratory system, Cataract, Clotting disorder ([facility]), Cold, Continuous ambulatory peritoneal dialysis status ([facility]), Coughing blood, Cushings syndrome ([facility]), Depression, Diabetic neuropathy ([facility]), Dialysis patient ([facility]), Encounter for long-term (current) use of other medications, GERD (gastroesophageal reflux disease), Glaucoma, Gynecological disorder, Heart attack (HCC), Heart murmur, Heart valve disease, Hemorrhagic disorder ([facility]), Hepatitis A, Hepatitis B, Hepatitis C, Hiatus  "hernia syndrome, High cholesterol, HIV (human immunodeficiency virus infection) (HCC), IBD (inflammatory bowel disease), Indigestion, Jaundice, Meningitis, Migraine, Osteoporosis, Pacemaker, Parathyroid disorder (HCC), Pituitary disease (HCC), Pulmonary emphysema (HCC), Rheumatic fever, Seizure (HCC), Sickle cell disease (HCC), Sleep apnea, Substance abuse (HCC), Tuberculosis, Urinary bladder disorder, or Urinary incontinence.  MEDS:   Current Outpatient Medications:     Continuous Blood Gluc Sensor (ResponsaSTYLE MAXWELL 14 DAY SENSOR) Mis, Apply one sensor every 14 days, Disp: 6 Each, Rfl: 3    metFORMIN (GLUCOPHAGE) 500 MG Tab, Take 500 mg PO AM and 1000 mg PO PM, Disp: 270 Tablet, Rfl: 3    Semaglutide,0.25 or 0.5MG/DOS, 2 MG/1.5ML Solution Pen-injector, Inject 0.25mg once weekly for four weeks and then repeat. May then go up to 0.5mg once weekly thereafter, Disp: 1.5 mL, Rfl: 5  ALLERGIES:   Allergies   Allergen Reactions    Amoxicillin      SURGHX:   Past Surgical History:   Procedure Laterality Date    CHOLECYSTECTOMY  1998     SOCHX:  reports that she quit smoking about 6 years ago. Her smoking use included cigarettes. She started smoking about 34 years ago. She has a 27.00 pack-year smoking history. She has never used smokeless tobacco. She reports current alcohol use. She reports current drug use. Drug: Marijuana.     Objective:   BP (!) 140/80   Pulse 90   Temp 36.9 °C (98.4 °F)   Resp 14   Ht 1.702 m (5' 7\")   Wt 104 kg (230 lb)   SpO2 95%   BMI 36.02 kg/m²     Physical Exam  Constitutional:       General: She is not in acute distress.     Appearance: She is well-developed. She is not diaphoretic.   HENT:      Head: Normocephalic and atraumatic.      Right Ear: Tympanic membrane, ear canal and external ear normal.      Left Ear: Tympanic membrane, ear canal and external ear normal.      Nose: Nose normal.      Mouth/Throat:      Mouth: Mucous membranes are moist.      Comments: Mild erythema in " posterior pharynx  Neck:      Trachea: No tracheal deviation.   Cardiovascular:      Rate and Rhythm: Normal rate and regular rhythm.   Pulmonary:      Effort: Pulmonary effort is normal. No respiratory distress.      Breath sounds: Normal breath sounds. No wheezing or rales.   Musculoskeletal:      Cervical back: Normal range of motion and neck supple. No tenderness.   Lymphadenopathy:      Cervical: No cervical adenopathy.   Skin:     General: Skin is warm and dry.      Findings: No rash.   Neurological:      Mental Status: She is alert.     Assessment/Plan:   Assessment    1. Viral URI with cough  - POCT CEPHEID COV-2, FLU A/B, RSV - PCR  - POCT CEPHEID GROUP A STREP - PCR    Patient with viral URI.  Point-of-care COVID/flu/RSV/strep all negative.  Reviewed supportive care measures and expected course recovery.  Follow-up in the urgent care as needed.

## 2023-03-29 ENCOUNTER — OFFICE VISIT (OUTPATIENT)
Dept: URGENT CARE | Facility: PHYSICIAN GROUP | Age: 43
End: 2023-03-29
Payer: COMMERCIAL

## 2023-03-29 VITALS
BODY MASS INDEX: 34.86 KG/M2 | TEMPERATURE: 97.3 F | WEIGHT: 230 LBS | SYSTOLIC BLOOD PRESSURE: 138 MMHG | HEIGHT: 68 IN | DIASTOLIC BLOOD PRESSURE: 88 MMHG | RESPIRATION RATE: 14 BRPM | OXYGEN SATURATION: 93 % | HEART RATE: 82 BPM

## 2023-03-29 DIAGNOSIS — H66.001 NON-RECURRENT ACUTE SUPPURATIVE OTITIS MEDIA OF RIGHT EAR WITHOUT SPONTANEOUS RUPTURE OF TYMPANIC MEMBRANE: ICD-10-CM

## 2023-03-29 PROCEDURE — 99213 OFFICE O/P EST LOW 20 MIN: CPT | Performed by: NURSE PRACTITIONER

## 2023-03-29 RX ORDER — AZITHROMYCIN 250 MG/1
TABLET, FILM COATED ORAL
Qty: 6 TABLET | Refills: 0 | Status: SHIPPED
Start: 2023-03-29 | End: 2023-09-11

## 2023-03-29 ASSESSMENT — ENCOUNTER SYMPTOMS
GASTROINTESTINAL NEGATIVE: 1
RESPIRATORY NEGATIVE: 1
EYES NEGATIVE: 1
FEVER: 0
NEUROLOGICAL NEGATIVE: 1
MUSCULOSKELETAL NEGATIVE: 1
CONSTITUTIONAL NEGATIVE: 1
CARDIOVASCULAR NEGATIVE: 1
CHILLS: 0

## 2023-03-29 ASSESSMENT — FIBROSIS 4 INDEX: FIB4 SCORE: 1.5

## 2023-03-29 NOTE — PROGRESS NOTES
"Subjective:   Caridad Nino is a 42 y.o. female who presents for Otalgia (R ear, can still hear slightly. Pops whenever swallowing. Pain is inside front of ear. Feels vibrations. X3 days )      Otalgia   There is pain in the right ear. This is a new problem. Episode onset: 48 hours - cold two weeks ago. The problem occurs constantly. The problem has been gradually worsening. There has been no fever. The pain is at a severity of 5/10. The pain is moderate. She has tried acetaminophen and NSAIDs for the symptoms. The treatment provided mild relief.     Review of Systems   Constitutional: Negative.  Negative for chills and fever.   HENT:  Positive for ear pain.    Eyes: Negative.    Respiratory: Negative.     Cardiovascular: Negative.    Gastrointestinal: Negative.    Genitourinary: Negative.    Musculoskeletal: Negative.    Skin: Negative.    Neurological: Negative.      Medications, Allergies, and current problem list reviewed today in Epic.     Objective:     /88   Pulse 82   Temp 36.3 °C (97.3 °F)   Resp 14   Ht 1.727 m (5' 8\")   Wt 104 kg (230 lb)   SpO2 93%     Physical Exam  Vitals reviewed.   Constitutional:       Appearance: Normal appearance.   HENT:      Head: Normocephalic and atraumatic.      Right Ear: Decreased hearing noted. Tenderness present. Tympanic membrane is erythematous and retracted.      Left Ear: Tympanic membrane, ear canal and external ear normal.      Nose: Nose normal.      Mouth/Throat:      Mouth: Mucous membranes are moist.      Pharynx: Oropharynx is clear.   Eyes:      Extraocular Movements: Extraocular movements intact.      Conjunctiva/sclera: Conjunctivae normal.      Pupils: Pupils are equal, round, and reactive to light.   Cardiovascular:      Rate and Rhythm: Normal rate and regular rhythm.   Pulmonary:      Effort: Pulmonary effort is normal.      Breath sounds: Normal breath sounds.   Abdominal:      General: Abdomen is flat.      Palpations: Abdomen is " soft.   Musculoskeletal:         General: Normal range of motion.      Cervical back: Normal range of motion and neck supple.   Skin:     General: Skin is warm and dry.      Capillary Refill: Capillary refill takes less than 2 seconds.   Neurological:      General: No focal deficit present.      Mental Status: She is alert.   Psychiatric:         Mood and Affect: Mood normal.         Behavior: Behavior normal.       Assessment/Plan:     Diagnosis and associated orders:     1. Non-recurrent acute suppurative otitis media of right ear without spontaneous rupture of tympanic membrane  azithromycin (ZITHROMAX) 250 MG Tab         Comments/MDM:     Provided parent and patient with information on the etiology and pathogenesis of otitis media. Instructed to take antibiotics as prescribed. May give Tylenol/Motrin prn discomfort. May apply warm compress to the ear for prn discomfort. RTC in 2 weeks for reevaluation.           Differential diagnosis, natural history, supportive care, and indications for immediate follow-up discussed.    Advised the patient to follow-up with the primary care physician for recheck, reevaluation, and consideration of further management.    Please note that this dictation was created using voice recognition software. I have made a reasonable attempt to correct obvious errors, but I expect that there are errors of grammar and possibly content that I did not discover before finalizing the note.    This note was electronically signed by ELA Santiago

## 2023-03-29 NOTE — LETTER
March 29, 2023       Patient: Caridad Nino   YOB: 1980   Date of Visit: 3/29/2023         To Whom It May Concern:    In my medical opinion, I recommend that Caridad Nino be excused from work 3/28//2023 through 3/30/2023 due to illness.     If you have any questions or concerns, please don't hesitate to call [phone]          Sincerely,          MARCELA Maya.P.R.N.  Electronically Signed

## 2023-06-03 ENCOUNTER — OFFICE VISIT (OUTPATIENT)
Dept: URGENT CARE | Facility: PHYSICIAN GROUP | Age: 43
End: 2023-06-03
Payer: COMMERCIAL

## 2023-06-03 VITALS
SYSTOLIC BLOOD PRESSURE: 124 MMHG | OXYGEN SATURATION: 98 % | BODY MASS INDEX: 37.35 KG/M2 | HEART RATE: 78 BPM | HEIGHT: 68 IN | RESPIRATION RATE: 16 BRPM | DIASTOLIC BLOOD PRESSURE: 80 MMHG | TEMPERATURE: 95.9 F | WEIGHT: 246.4 LBS

## 2023-06-03 DIAGNOSIS — J32.9 RHINOSINUSITIS: ICD-10-CM

## 2023-06-03 DIAGNOSIS — J06.9 VIRAL URI WITH COUGH: ICD-10-CM

## 2023-06-03 PROCEDURE — 3079F DIAST BP 80-89 MM HG: CPT | Performed by: PHYSICIAN ASSISTANT

## 2023-06-03 PROCEDURE — 3074F SYST BP LT 130 MM HG: CPT | Performed by: PHYSICIAN ASSISTANT

## 2023-06-03 PROCEDURE — 99213 OFFICE O/P EST LOW 20 MIN: CPT | Performed by: PHYSICIAN ASSISTANT

## 2023-06-03 RX ORDER — CYCLOBENZAPRINE HCL 5 MG
TABLET ORAL
COMMUNITY
End: 2023-06-03

## 2023-06-03 RX ORDER — MELOXICAM 15 MG/1
TABLET ORAL
COMMUNITY
End: 2023-06-03

## 2023-06-03 RX ORDER — CODEINE PHOSPHATE AND GUAIFENESIN 10; 100 MG/5ML; MG/5ML
SOLUTION ORAL
COMMUNITY
End: 2023-06-03

## 2023-06-03 RX ORDER — METHYLPREDNISOLONE 4 MG/1
TABLET ORAL
Qty: 21 TABLET | Refills: 0 | Status: SHIPPED | OUTPATIENT
Start: 2023-06-03 | End: 2023-09-11

## 2023-06-03 RX ORDER — CEFDINIR 300 MG/1
CAPSULE ORAL
COMMUNITY
End: 2023-06-03

## 2023-06-03 RX ORDER — TRIAMCINOLONE ACETONIDE 1 MG/G
CREAM TOPICAL
COMMUNITY
End: 2023-06-03

## 2023-06-03 RX ORDER — FLASH GLUCOSE SCANNING READER
EACH MISCELLANEOUS
COMMUNITY
End: 2023-06-03

## 2023-06-03 RX ORDER — BENZONATATE 100 MG/1
CAPSULE ORAL
COMMUNITY
End: 2023-06-03

## 2023-06-03 RX ORDER — NAPROXEN 500 MG/1
TABLET ORAL
COMMUNITY
End: 2023-06-03

## 2023-06-03 RX ORDER — METFORMIN HYDROCHLORIDE 500 MG/1
TABLET, EXTENDED RELEASE ORAL
COMMUNITY
End: 2023-06-03

## 2023-06-03 RX ORDER — ALBUTEROL SULFATE 90 UG/1
AEROSOL, METERED RESPIRATORY (INHALATION)
COMMUNITY
End: 2023-06-03

## 2023-06-03 RX ORDER — TRAMADOL HYDROCHLORIDE 50 MG/1
TABLET ORAL
COMMUNITY
End: 2023-06-03

## 2023-06-03 RX ORDER — HYDROXYZINE HYDROCHLORIDE 25 MG/1
TABLET, FILM COATED ORAL
COMMUNITY
End: 2023-06-03

## 2023-06-03 RX ORDER — PREDNISONE 20 MG/1
TABLET ORAL
COMMUNITY
End: 2023-06-03

## 2023-06-03 ASSESSMENT — FIBROSIS 4 INDEX: FIB4 SCORE: 1.5

## 2023-06-03 NOTE — PROGRESS NOTES
"Subjective:   Caridad Nino is a 42 y.o. female who presents for Ear Fullness and Nasal Congestion (Started yesterday, cough, chest and nasal congestion, short of breath, hoarse voice no OTCs)     This is a pleasant 42-year-old female accompanied by her  with chief complaint of 1 day history of pressure to right ear and chest congestion, tightness, coughing up green phlegm, chills.  Mild ha, sinus pressure.  Symptoms started yesterday.  No obvious covid concerns.  Loss of voice.  No fevers/chills.  H/o asthma, using inhaler regularly.          Medications:  azithromycin Tabs  FreeStyle Jodi 14 Day Sensor Misc  metFORMIN Tabs  Semaglutide(0.25 or 0.5MG/DOS) Sopn    Allergies:             Amoxicillin    Surgical History:         Past Surgical History:   Procedure Laterality Date    CHOLECYSTECTOMY  1998       Past Social Hx:  Caridad Nino  reports that she quit smoking about 7 years ago. Her smoking use included cigarettes. She started smoking about 34 years ago. She has a 27.00 pack-year smoking history. She has never used smokeless tobacco. She reports current alcohol use. She reports current drug use. Drug: Marijuana.     Past Family Hx:   Caridad Nino family history includes Alcohol abuse in her paternal grandmother; Cancer in her mother and sister; Diabetes in her mother; Other in her mother.       Problem list, medications, and allergies reviewed by myself today in Epic.     Objective:     /80   Pulse 78   Temp (!) 35.5 °C (95.9 °F) (Temporal)   Resp 16   Ht 1.727 m (5' 8\")   Wt 112 kg (246 lb 6.4 oz)   SpO2 98%   BMI 37.46 kg/m²     Physical Exam  Vitals and nursing note reviewed.   Constitutional:       General: She is not in acute distress.     Appearance: Normal appearance. She is not ill-appearing or toxic-appearing.   HENT:      Head: Normocephalic.      Jaw: No trismus.      Right Ear: External ear normal. No drainage. A middle ear effusion is " present. No mastoid tenderness. Tympanic membrane is not erythematous or bulging.      Left Ear: External ear normal. No drainage. A middle ear effusion is present. No mastoid tenderness. Tympanic membrane is not erythematous or bulging.      Nose: Congestion and rhinorrhea present.      Right Turbinates: Enlarged and swollen.      Left Turbinates: Enlarged and swollen.      Mouth/Throat:      Mouth: Mucous membranes are moist.      Tongue: No lesions. Tongue does not deviate from midline.      Palate: No lesions.      Pharynx: Uvula midline. Posterior oropharyngeal erythema present. No oropharyngeal exudate or uvula swelling.      Tonsils: No tonsillar exudate or tonsillar abscesses.   Eyes:      General:         Right eye: No discharge.         Left eye: No discharge.      Extraocular Movements: Extraocular movements intact.   Cardiovascular:      Rate and Rhythm: Normal rate and regular rhythm.      Pulses: Normal pulses.      Heart sounds: Normal heart sounds. No murmur heard.  Pulmonary:      Effort: Pulmonary effort is normal. No accessory muscle usage or respiratory distress.      Breath sounds: Normal breath sounds and air entry. No stridor or decreased air movement. No wheezing, rhonchi or rales.      Comments: Lungs CTA b/l  Musculoskeletal:      Cervical back: Normal range of motion. No rigidity.   Lymphadenopathy:      Head:      Right side of head: No tonsillar adenopathy.      Left side of head: No tonsillar adenopathy.      Cervical: No cervical adenopathy.   Skin:     General: Skin is warm.      Findings: No rash.   Neurological:      Mental Status: She is alert.   Psychiatric:         Behavior: Behavior is cooperative.         Assessment/Plan:     Diagnosis and Associated Orders:     1. Rhinosinusitis  - methylPREDNISolone (MEDROL DOSEPAK) 4 MG Tablet Therapy Pack; Follow schedule on package instructions.  Dispense: 21 Tablet; Refill: 0    2. Viral URI with cough        Comments/MDM:  On short  duration of symptoms, 1 day, suspect viral etiology.  Conservative measures as below.  Discussed signs of secondary bacterial infection and timing.  Vital signs stable and reassuring.  Lungs clear to auscultation bilaterally.  No wheezing or indication for antibiotics at this time.  -Increase water intake  -May use over the counter Ibuprofen/Tylenol as needed for any fever, body aches or throat pain  -May take long acting antihistamine for seasonal allergy symptoms and post-nasal drip as needed  -Over the counter cough suppressant as directed.  -May use over the counter saline nasal spray for nasal lavage for nasal congestion as needed  -May use over the counter Nasacort/Flonase for nasal congestion as needed   -May use throat lozenges for throat discomfort as needed   -May gargle with salt water up to 4x/day as needed for throat discomfort (1 tsp salt dissolved in 1 cup warm water)  -Monitor for increased sinus pain/pressure with sinus congestion with thick mucus production, sinus headache, cough, shortness of breath, fever- need re-evaluation      I personally reviewed prior external notes and test results pertinent to today's visit. Supportive care, natural history, differential diagnoses, and indications for immediate follow-up discussed. Return to clinic or go to ED if symptoms worsen or persist.  Red flag symptoms discussed.  Patient/Parent/Guardian voices understanding. Follow-up with your primary care provider in 3-5 days.  All side effects of medication discussed including allergic response, GI upset, tendon injury, rash, sedation etc    Please note that this dictation was created using voice recognition software. I have made a reasonable attempt to correct obvious errors, but I expect that there are errors of grammar and possibly content that I did not discover before finalizing the note.    This note was electronically signed by Debi Hamilton PA-C

## 2023-09-11 ENCOUNTER — OFFICE VISIT (OUTPATIENT)
Dept: MEDICAL GROUP | Facility: MEDICAL CENTER | Age: 43
End: 2023-09-11
Payer: COMMERCIAL

## 2023-09-11 VITALS
TEMPERATURE: 97.3 F | SYSTOLIC BLOOD PRESSURE: 124 MMHG | RESPIRATION RATE: 20 BRPM | HEIGHT: 67 IN | BODY MASS INDEX: 38.77 KG/M2 | OXYGEN SATURATION: 98 % | WEIGHT: 247 LBS | DIASTOLIC BLOOD PRESSURE: 82 MMHG | HEART RATE: 86 BPM

## 2023-09-11 DIAGNOSIS — E11.65 TYPE 2 DIABETES MELLITUS WITH HYPERGLYCEMIA, WITHOUT LONG-TERM CURRENT USE OF INSULIN (HCC): ICD-10-CM

## 2023-09-11 DIAGNOSIS — B37.31 VAGINAL CANDIDIASIS: ICD-10-CM

## 2023-09-11 DIAGNOSIS — Z12.11 SCREENING FOR COLORECTAL CANCER: ICD-10-CM

## 2023-09-11 DIAGNOSIS — E66.9 OBESITY (BMI 30-39.9): ICD-10-CM

## 2023-09-11 DIAGNOSIS — J01.10 ACUTE NON-RECURRENT FRONTAL SINUSITIS: ICD-10-CM

## 2023-09-11 DIAGNOSIS — Z12.12 SCREENING FOR COLORECTAL CANCER: ICD-10-CM

## 2023-09-11 LAB
HBA1C MFR BLD: 9.5 % (ref ?–5.8)
POCT INT CON NEG: NEGATIVE
POCT INT CON POS: POSITIVE

## 2023-09-11 PROCEDURE — 99214 OFFICE O/P EST MOD 30 MIN: CPT | Performed by: PHYSICIAN ASSISTANT

## 2023-09-11 PROCEDURE — 83036 HEMOGLOBIN GLYCOSYLATED A1C: CPT | Performed by: PHYSICIAN ASSISTANT

## 2023-09-11 PROCEDURE — 3074F SYST BP LT 130 MM HG: CPT | Performed by: PHYSICIAN ASSISTANT

## 2023-09-11 PROCEDURE — 3079F DIAST BP 80-89 MM HG: CPT | Performed by: PHYSICIAN ASSISTANT

## 2023-09-11 RX ORDER — DOXYCYCLINE HYCLATE 100 MG
100 TABLET ORAL 2 TIMES DAILY
Qty: 14 TABLET | Refills: 0 | Status: SHIPPED | OUTPATIENT
Start: 2023-09-11 | End: 2023-09-18

## 2023-09-11 RX ORDER — FLUCONAZOLE 150 MG/1
TABLET ORAL
Qty: 2 TABLET | Refills: 0 | Status: SHIPPED | OUTPATIENT
Start: 2023-09-11 | End: 2023-10-16

## 2023-09-11 RX ORDER — DEXTROMETHORPHAN HYDROBROMIDE AND PROMETHAZINE HYDROCHLORIDE 15; 6.25 MG/5ML; MG/5ML
SYRUP ORAL
Qty: 180 ML | Refills: 0 | Status: SHIPPED | OUTPATIENT
Start: 2023-09-11

## 2023-09-11 ASSESSMENT — FIBROSIS 4 INDEX: FIB4 SCORE: 1.53

## 2023-09-11 NOTE — PROGRESS NOTES
"Subjective:   Caridad Nino is a 43 y.o. female here today for     HPI:Patient is here to discuss: Type 2 diabetes medication and cough    Patient comes in and it has been about a year.  Has a history of type 2 diabetes but has had insurance changes and difficulty getting medication.  Currently has not been taking any diabetes medication for about 3 months.  Needs medication refills but also labs.    So    ROS   No headaches, chest pain, no shortness of breath, abdominal pain, nausea, or vomiting.  All other systems were reviewed and are negative or noted as positive in the HPI.     Objective:     /82   Pulse 86   Temp 36.3 °C (97.3 °F) (Temporal)   Resp 20   Ht 1.7 m (5' 6.93\")   Wt 112 kg (247 lb)   SpO2 98%  Body mass index is 38.77 kg/m².     Physical Exam:  General: Patient appears well-nourished, well-hydrated, nontoxic  HEENT, normocephalic atraumatic, PERRLA, extraocular movements intact, nares are patent and clear  Neck: No visible masses, thyromegaly or abnormalities noted  Cardiovascular.  Sitting comfortably without visible signs of edema  Lungs: No cyanosis noted, nondyspneic  Skin: Well perfused without evidence of rash or lesions  Neurological: Cranial nerves II through XII intact, normal gait  Musculoskeletal: Normal range of motion, normal strength and no deficit noted     Clinical Course/Lab Analysis:        Assessment and Plan:   The following treatment plan was discussed.  Signs and symptoms for which to return were discussed with patient at length.  Patient verbalized understanding.    1. Type 2 diabetes mellitus with hyperglycemia, without long-term current use of insulin ([facility])  Referral to Diabetic Education    Referral to Pharmacotherapy Service    Comp Metabolic Panel    Hemoglobin A1c    Lipid Profile    Microalbumin Creat Ratio Urine - Lab Collect    Referral to Ophthalmology    HEMOGLOBIN A1C    metFORMIN (GLUCOPHAGE) 500 MG Tab    Empagliflozin (JARDIANCE) 10 MG " Tab tablet    CANCELED: HEMOGLOBIN A1C      2. Screening for colorectal cancer  COLOGSALBADOR (FIT DNA)    Patient identified as having weight management issue.  Appropriate orders and counseling given.      3. Obesity (BMI 30-39.9)        4. Acute non-recurrent frontal sinusitis  doxycycline (VIBRAMYCIN) 100 MG Tab    promethazine-dextromethorphan (PROMETHAZINE-DM) 6.25-15 MG/5ML syrup      5. Vaginal candidiasis  fluconazole (DIFLUCAN) 150 MG tablet          1.  Type 2 diabetes is a chronic and unstable condition: Patient has not been on medicine for 3 months as she had some insurance change and difficulty with getting medication.  However her hemoglobin A1c today in clinic was 9.5%.  This is actually lower than when I saw her 1 year ago.  I am putting in for referrals to diabetes education, pharmacotherapy.  Ordering diabetes lab panel as well as starting her back on metformin 500 twice daily and I will also add Jardiance to this.  She has not been on Jardiance before.  She had been on Ozempic previously.  We will consider adding this for weight reduction in the future once she has shown she has been on metformin for some time for insurance purposes    Sinusitis is a new and unstable condition.  Discussed starting antibiotic and taking supportive care measures.  We will also send cough medicine      Followup: 4 to 6 weeks for annual as today she is sick and we need to repeat initiate diabetes care    Please note that this dictation was created using voice recognition software. I have made every reasonable attempt to correct obvious errors, but I expect that there are errors of grammar and possibly content that I did not discover before finalizing the note.

## 2023-09-12 ENCOUNTER — HOSPITAL ENCOUNTER (OUTPATIENT)
Dept: LAB | Facility: MEDICAL CENTER | Age: 43
End: 2023-09-12
Attending: PHYSICIAN ASSISTANT
Payer: COMMERCIAL

## 2023-09-12 DIAGNOSIS — E11.65 TYPE 2 DIABETES MELLITUS WITH HYPERGLYCEMIA, WITHOUT LONG-TERM CURRENT USE OF INSULIN (HCC): ICD-10-CM

## 2023-09-12 LAB
ALBUMIN SERPL BCP-MCNC: 4.1 G/DL (ref 3.2–4.9)
ALBUMIN/GLOB SERPL: 1.3 G/DL
ALP SERPL-CCNC: 77 U/L (ref 30–99)
ALT SERPL-CCNC: 26 U/L (ref 2–50)
ANION GAP SERPL CALC-SCNC: 10 MMOL/L (ref 7–16)
AST SERPL-CCNC: 21 U/L (ref 12–45)
BILIRUB SERPL-MCNC: 0.2 MG/DL (ref 0.1–1.5)
BUN SERPL-MCNC: 18 MG/DL (ref 8–22)
CALCIUM ALBUM COR SERPL-MCNC: 9.4 MG/DL (ref 8.5–10.5)
CALCIUM SERPL-MCNC: 9.5 MG/DL (ref 8.5–10.5)
CHLORIDE SERPL-SCNC: 102 MMOL/L (ref 96–112)
CHOLEST SERPL-MCNC: 184 MG/DL (ref 100–199)
CO2 SERPL-SCNC: 26 MMOL/L (ref 20–33)
CREAT SERPL-MCNC: 0.76 MG/DL (ref 0.5–1.4)
CREAT UR-MCNC: 274.68 MG/DL
EST. AVERAGE GLUCOSE BLD GHB EST-MCNC: 223 MG/DL
GFR SERPLBLD CREATININE-BSD FMLA CKD-EPI: 98 ML/MIN/1.73 M 2
GLOBULIN SER CALC-MCNC: 3.1 G/DL (ref 1.9–3.5)
GLUCOSE SERPL-MCNC: 201 MG/DL (ref 65–99)
HBA1C MFR BLD: 9.4 % (ref 4–5.6)
HDLC SERPL-MCNC: 31 MG/DL
LDLC SERPL CALC-MCNC: 117 MG/DL
MICROALBUMIN UR-MCNC: 8 MG/DL
MICROALBUMIN/CREAT UR: 29 MG/G (ref 0–30)
POTASSIUM SERPL-SCNC: 4.2 MMOL/L (ref 3.6–5.5)
PROT SERPL-MCNC: 7.2 G/DL (ref 6–8.2)
SODIUM SERPL-SCNC: 138 MMOL/L (ref 135–145)
TRIGL SERPL-MCNC: 182 MG/DL (ref 0–149)

## 2023-09-12 PROCEDURE — 36415 COLL VENOUS BLD VENIPUNCTURE: CPT

## 2023-09-12 PROCEDURE — 80061 LIPID PANEL: CPT

## 2023-09-12 PROCEDURE — 82043 UR ALBUMIN QUANTITATIVE: CPT

## 2023-09-12 PROCEDURE — 83036 HEMOGLOBIN GLYCOSYLATED A1C: CPT

## 2023-09-12 PROCEDURE — 80053 COMPREHEN METABOLIC PANEL: CPT

## 2023-09-12 PROCEDURE — 82570 ASSAY OF URINE CREATININE: CPT

## 2023-10-16 ENCOUNTER — OFFICE VISIT (OUTPATIENT)
Dept: MEDICAL GROUP | Facility: MEDICAL CENTER | Age: 43
End: 2023-10-16
Payer: COMMERCIAL

## 2023-10-16 VITALS
RESPIRATION RATE: 20 BRPM | OXYGEN SATURATION: 90 % | WEIGHT: 252 LBS | BODY MASS INDEX: 39.55 KG/M2 | DIASTOLIC BLOOD PRESSURE: 80 MMHG | HEART RATE: 86 BPM | TEMPERATURE: 97.5 F | SYSTOLIC BLOOD PRESSURE: 120 MMHG | HEIGHT: 67 IN

## 2023-10-16 DIAGNOSIS — Z11.59 ENCOUNTER FOR HEPATITIS C SCREENING TEST FOR LOW RISK PATIENT: ICD-10-CM

## 2023-10-16 DIAGNOSIS — E11.65 TYPE 2 DIABETES MELLITUS WITH HYPERGLYCEMIA, WITHOUT LONG-TERM CURRENT USE OF INSULIN (HCC): ICD-10-CM

## 2023-10-16 DIAGNOSIS — Z23 NEED FOR VACCINATION: ICD-10-CM

## 2023-10-16 DIAGNOSIS — J98.8 RTI (RESPIRATORY TRACT INFECTION): ICD-10-CM

## 2023-10-16 DIAGNOSIS — Z00.00 ANNUAL PHYSICAL EXAM: Primary | ICD-10-CM

## 2023-10-16 DIAGNOSIS — Z11.59 ENCOUNTER FOR HEPATITIS C VIRUS SCREENING TEST FOR HIGH RISK PATIENT: ICD-10-CM

## 2023-10-16 DIAGNOSIS — E78.2 MIXED HYPERLIPIDEMIA: ICD-10-CM

## 2023-10-16 DIAGNOSIS — Z91.89 ENCOUNTER FOR HEPATITIS C VIRUS SCREENING TEST FOR HIGH RISK PATIENT: ICD-10-CM

## 2023-10-16 PROCEDURE — 99396 PREV VISIT EST AGE 40-64: CPT | Mod: 25 | Performed by: PHYSICIAN ASSISTANT

## 2023-10-16 PROCEDURE — 3074F SYST BP LT 130 MM HG: CPT | Performed by: PHYSICIAN ASSISTANT

## 2023-10-16 PROCEDURE — 90471 IMMUNIZATION ADMIN: CPT | Performed by: PHYSICIAN ASSISTANT

## 2023-10-16 PROCEDURE — 90686 IIV4 VACC NO PRSV 0.5 ML IM: CPT | Performed by: PHYSICIAN ASSISTANT

## 2023-10-16 PROCEDURE — 3079F DIAST BP 80-89 MM HG: CPT | Performed by: PHYSICIAN ASSISTANT

## 2023-10-16 PROCEDURE — 99214 OFFICE O/P EST MOD 30 MIN: CPT | Mod: 25 | Performed by: PHYSICIAN ASSISTANT

## 2023-10-16 RX ORDER — AZITHROMYCIN 250 MG/1
TABLET, FILM COATED ORAL
Qty: 6 TABLET | Refills: 0 | Status: SHIPPED | OUTPATIENT
Start: 2023-10-16

## 2023-10-16 RX ORDER — ROSUVASTATIN CALCIUM 5 MG/1
5 TABLET, COATED ORAL EVERY EVENING
Qty: 30 TABLET | Refills: 11 | Status: SHIPPED | OUTPATIENT
Start: 2023-10-16

## 2023-10-16 ASSESSMENT — PATIENT HEALTH QUESTIONNAIRE - PHQ9: CLINICAL INTERPRETATION OF PHQ2 SCORE: 0

## 2023-10-16 ASSESSMENT — FIBROSIS 4 INDEX: FIB4 SCORE: 1.2

## 2023-10-16 NOTE — ASSESSMENT & PLAN NOTE
Chronic and unstable  Continue metformin 500 mg twice daily.  Current hemoglobin A1c is elevated at 9.4%.  I am going to restart semaglutide at a dose of 0.25 mg weekly and we will increase the dose based on her tolerability in 4 to 8 weeks.  We will also consider readding Jardiance 10 mg but do this may be at next appointment to get her used to semaglutide first

## 2023-10-16 NOTE — ASSESSMENT & PLAN NOTE
New and unstable  Starting rosuvastatin but at a dose of 5 mg will likely increase to 10 mg but have her recheck cholesterol and metabolic panel first to make sure normal liver function

## 2023-10-16 NOTE — PROGRESS NOTES
Subjective:   Caridad Nino is a 43 y.o. female here today for     HPI: Patient is here to discuss:  Problem   Mixed Hyperlipidemia    October 2023: Total cholesterol 184, triglycerides 182, HDL 31,      Type 2 Diabetes Mellitus With Hyperglycemia, Without Long-Term Current Use of Insulin ([facility])    1/18/22: hbga1c 8.7 October 2022 hemoglobin A1c was 10.s  She has been on Ozempic for 1 month and is tolerating.  Can also takes month metformin 500 mg every morning and 1000 every afternoon without nausea or diarrhea    10/16/23: Taking metformin 500 mg twice daily.  Is not on Ozempic.  Switched pharmacies and [facility] never gave to her.  Is not currently taking Jardiance either            Social/Family: , two kids  Work: [email or web address]  Diet: poor  Caffeine intake: 24 oz of coffee  Exercise:none  Stress: some  Sleep:well  Depression/Anxiety Concerns: none      Current medicines (including changes today)  Current Outpatient Medications   Medication Sig Dispense Refill    Semaglutide,0.25 or 0.5MG/DOS, 2 MG/1.5ML Solution Pen-injector Inject 0.25 mg every week on Sunday. 1.5 mL 3    azithromycin (ZITHROMAX) 250 MG Tab Take two tablets on day one, then on tablet the following four days 6 Tablet 0    rosuvastatin (CRESTOR) 5 MG Tab Take 1 Tablet by mouth every evening. 30 Tablet 11    metFORMIN (GLUCOPHAGE) 500 MG Tab TAKE 1 TABLET BY MOUTH TWICE A DAY WITH FOOD 180 Tablet 3    promethazine-dextromethorphan (PROMETHAZINE-DM) 6.25-15 MG/5ML syrup Take 5mls every six hours as needed for cough 180 mL 0    Continuous Blood Gluc Sensor (FREESTYLE MAXWELL 14 DAY SENSOR[place] [facility] [place] one sensor every 14 days 6 Each 3     No current facility-administered medications for this visit.     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    She has no past medical history of Addisons disease (HCC), Allergy, Anesthesia, Anginal syndrome (HCC), Anxiety, Arrhythmia, Arthritis, Blood clotting disorder (HCC), Blood transfusion without reported diagnosis, Bowel habit changes, Breath shortness, Carcinoma in situ of respiratory system, Cataract, Clotting disorder (HCC), Cold, Continuous ambulatory peritoneal dialysis status (HCC), Coughing blood, Cushings syndrome (HCC), Depression, Diabetic neuropathy (HCC), Dialysis patient (HCC), Encounter for long-term (current) use of other medications, GERD (gastroesophageal reflux disease), Glaucoma, Gynecological disorder, Heart attack (HCC), Heart murmur, Heart valve disease, Hemorrhagic disorder (HCC), Hepatitis A, Hepatitis B, Hepatitis C, Hiatus hernia syndrome, High cholesterol, HIV (human immunodeficiency virus infection) (HCC), IBD (inflammatory bowel disease), Indigestion, Jaundice, Meningitis, Migraine, Osteoporosis, Pacemaker, Parathyroid disorder (HCC), Pituitary disease (HCC), Pulmonary emphysema (HCC), Rheumatic fever, Seizure (HCC), Sickle cell disease (HCC), Sleep apnea, Substance abuse (HCC), Tuberculosis, Urinary bladder disorder, or Urinary incontinence.  Amoxicillin     Social History and Family History were reviewed and updated.    ROS   No headaches, chest pain, no shortness of breath, abdominal pain, nausea, or vomiting.  All other systems were reviewed and are negative or noted as positive in the HPI.       Objective:     /80   Pulse 86   Temp 36.4 °C (97.5 °F) (Temporal)   Resp 20   Ht 1.702 m (5' 7\")   Wt 114 kg (252 lb)   SpO2 90%  Body mass index is 39.47 kg/m².     Physical " Exam:  General: Patient appears well-nourished, well-hydrated, nontoxic  HEENT, normocephalic atraumatic, PERRLA, extraocular movements intact, nares are patent and clear  Neck: No visible masses, thyromegaly or abnormalities noted  Cardiovascular.  Sitting comfortably without visible signs of edema  Lungs: No cyanosis noted, nondyspneic  Skin: Well perfused without evidence of rash or lesions  Neurological: Cranial nerves II through XII intact, normal gait  Musculoskeletal: Normal range of motion, normal strength and no deficit noted     Clinical Course/Lab Analysis:     Latest Reference Range & Units 09/12/23 09:11   Sodium 135 - 145 mmol/L 138   Potassium 3.6 - 5.5 mmol/L 4.2   Chloride 96 - 112 mmol/L 102   Co2 20 - 33 mmol/L 26   Anion Gap 7.0 - 16.0  10.0   Glucose 65 - 99 mg/dL 201 (H)   Bun 8 - 22 mg/dL 18   Creatinine 0.50 - 1.40 mg/dL 0.76   GFR (CKD-EPI) >60 mL/min/1.73 m 2 98   Calcium 8.5 - 10.5 mg/dL 9.5   Correct Calcium 8.5 - 10.5 mg/dL 9.4   AST(SGOT) 12 - 45 U/L 21   ALT(SGPT) 2 - 50 U/L 26   Alkaline Phosphatase 30 - 99 U/L 77   Total Bilirubin 0.1 - 1.5 mg/dL 0.2   Albumin 3.2 - 4.9 g/dL 4.1   Total Protein 6.0 - 8.2 g/dL 7.2   Globulin 1.9 - 3.5 g/dL 3.1   A-G Ratio g/dL 1.3   Glycohemoglobin 4.0 - 5.6 % 9.4 (H)   Estim. Avg Glu mg/dL 223   Cholesterol,Tot 100 - 199 mg/dL 184   Triglycerides 0 - 149 mg/dL 182 (H)   HDL >=40 mg/dL 31 !   LDL <100 mg/dL 117 (H)   Micro Alb Creat Ratio 0 - 30 mg/g 29   Creatinine, Urine mg/dL 274.68   Microalbumin, Urine Random mg/dL 8.0   (H): Data is abnormally high  !: Data is abnormal    Assessment and Plan:   The following treatment plan was discussed.  Signs and symptoms for which to return were discussed with patient at length.  Patient verbalized understanding.    Problem List Items Addressed This Visit       Type 2 diabetes mellitus with hyperglycemia, without long-term current use of insulin (HCC)     Chronic and unstable  Continue metformin 500 mg twice  daily.  Current hemoglobin A1c is elevated at 9.4%.  I am going to restart semaglutide at a dose of 0.25 mg weekly and we will increase the dose based on her tolerability in 4 to 8 weeks.  We will also consider readding Jardiance 10 mg but do this may be at next appointment to get her used to semaglutide first         Relevant Medications    Semaglutide,0.25 or 0.5MG/DOS, 2 MG/1.5ML Solution Pen-injector    Other Relevant Orders    Comp Metabolic Panel    Hemoglobin A1c    Microalbumin Creat Ratio Urine - Lab Collect    Diabetic Monofilament Lower Extremity Exam (Completed)    Mixed hyperlipidemia     New and unstable  Starting rosuvastatin but at a dose of 5 mg will likely increase to 10 mg but have her recheck cholesterol and metabolic panel first to make sure normal liver function         Relevant Medications    rosuvastatin (CRESTOR) 5 MG Tab     Other Visit Diagnoses       Annual physical exam    -  Primary    Need for vaccination        Relevant Orders    Influenza Vaccine Quad Injection (PF) (Completed)    Encounter for hepatitis C virus screening test for high risk patient        Encounter for hepatitis C screening test for low risk patient        Relevant Orders    HEP C VIRUS ANTIBODY    RTI (respiratory tract infection)        Relevant Medications    azithromycin (ZITHROMAX) 250 MG Tab           Anticipatory guidance discussed at length: Influenza vaccine given.  Discussed importance of modifying diet to eat a Mediterranean based diet and exercising at least 150 minutes/week.  This will help lower triglyceride.  Please follow-up 4 to 6 weeks  Reinitiating semaglutide and drawingher labs drawn and also starting her on rosuvastatin she will need to recheck a metabolic panel to make sure liver function is okay        Followup: 4 to 6 weeks    Please note that this dictation was created using voice recognition software. I have made every reasonable attempt to correct obvious errors, but I expect that there  are errors of grammar and possibly content that I did not discover before finalizing the note.

## 2023-10-29 ENCOUNTER — OFFICE VISIT (OUTPATIENT)
Dept: URGENT CARE | Facility: CLINIC | Age: 43
End: 2023-10-29
Payer: COMMERCIAL

## 2023-10-29 VITALS
HEART RATE: 64 BPM | WEIGHT: 248 LBS | HEIGHT: 67 IN | OXYGEN SATURATION: 97 % | TEMPERATURE: 98.4 F | SYSTOLIC BLOOD PRESSURE: 124 MMHG | BODY MASS INDEX: 38.92 KG/M2 | RESPIRATION RATE: 16 BRPM | DIASTOLIC BLOOD PRESSURE: 78 MMHG

## 2023-10-29 DIAGNOSIS — H10.31 ACUTE BACTERIAL CONJUNCTIVITIS OF RIGHT EYE: ICD-10-CM

## 2023-10-29 PROCEDURE — 3078F DIAST BP <80 MM HG: CPT | Performed by: NURSE PRACTITIONER

## 2023-10-29 PROCEDURE — 99213 OFFICE O/P EST LOW 20 MIN: CPT | Performed by: NURSE PRACTITIONER

## 2023-10-29 PROCEDURE — 3074F SYST BP LT 130 MM HG: CPT | Performed by: NURSE PRACTITIONER

## 2023-10-29 RX ORDER — OFLOXACIN 3 MG/ML
1 SOLUTION/ DROPS OPHTHALMIC 4 TIMES DAILY
Qty: 10 ML | Refills: 0 | Status: SHIPPED | OUTPATIENT
Start: 2023-10-29 | End: 2023-11-05

## 2023-10-29 ASSESSMENT — VISUAL ACUITY: OU: 1

## 2023-10-29 ASSESSMENT — FIBROSIS 4 INDEX: FIB4 SCORE: 1.2

## 2023-10-29 NOTE — PROGRESS NOTES
"Date: 10/29/23     Chief Complaint:    Chief Complaint   Patient presents with    Eye Problem     Right eye, Started this morning, \" Burning, itching, redness, swelling, clear drainage.\"         History of Present Illness: 43 y.o.  female presents to clinic with 1 day history of right eye irritation redness and itching.  Patient denies any known foreign body or trauma to the right eye.  She denies any sick contacts.  She states she has had excessive tearfulness.  She states she is now having some left eye irritation as well although not as severe.  She denies any sore throat nasal congestion or cough.  She denies any vision changes seems out of flashes of light or pain with eye movements.        ROS:    As stated in HPI     Medical/SX/ Social History:  Reviewed per chart    Pertinent Medications:    Current Outpatient Medications on File Prior to Visit   Medication Sig Dispense Refill    Semaglutide,0.25 or 0.5MG/DOS, 2 MG/1.5ML Solution Pen-injector Inject 0.25 mg every week on Sunday. 1.5 mL 3    rosuvastatin (CRESTOR) 5 MG Tab Take 1 Tablet by mouth every evening. 30 Tablet 11    metFORMIN (GLUCOPHAGE) 500 MG Tab TAKE 1 TABLET BY MOUTH TWICE A DAY WITH FOOD 180 Tablet 3    Continuous Blood Gluc Sensor (Knowledge AdventureSTYLE MAXWELL 14 DAY SENSOR) Misc Apply one sensor every 14 days 6 Each 3    azithromycin (ZITHROMAX) 250 MG Tab Take two tablets on day one, then on tablet the following four days (Patient not taking: Reported on 10/29/2023) 6 Tablet 0    promethazine-dextromethorphan (PROMETHAZINE-DM) 6.25-15 MG/5ML syrup Take 5mls every six hours as needed for cough (Patient not taking: Reported on 10/29/2023) 180 mL 0     No current facility-administered medications on file prior to visit.        Allergies:    Amoxicillin     Problem list, medications, and allergies reviewed by myself today in Epic     Physical Exam:    Vitals:    10/29/23 1229   BP: 124/78   Pulse: 64   Resp: 16   Temp: 36.9 °C (98.4 °F)   SpO2: 97%        "      Physical Exam  Constitutional:       Appearance: Normal appearance.   HENT:      Head: Normocephalic and atraumatic.   Eyes:      General: Lids are normal. Lids are everted, no foreign bodies appreciated. Vision grossly intact. Gaze aligned appropriately. No allergic shiner or scleral icterus.     Extraocular Movements: Extraocular movements intact.      Conjunctiva/sclera:      Right eye: Right conjunctiva is injected. No chemosis, exudate or hemorrhage.     Left eye: Left conjunctiva is injected (mild).   Neurological:      Mental Status: She is alert.              Medical Decision making and plan :  I personally reviewed prior external notes and test results pertinent to today's visit. Pt is clinically stable at today's acute urgent care visit.  Patient appears nontoxic with no acute distress noted. Appropriate for outpatient care at this time. The patient remained stable during the urgent care visit.     Pleasant 43 y.o. female presented clinic with HPI exam findings consistent with right eye conjunctivitis with mild left injection as well.  Excessive tearfulness.  No signs of preseptal or orbital cellulitis.  Discussed viral allergic versus bacterial etiology as a cause of symptoms.  Patient does not wear contacts.  Out of caution we will send for ofloxacin 4 times daily to treat bacterial etiology.  Shared decision-making was utilized with patient for treatment plan.  Differential Diagnosis, natural history, and supportive care discussed.    1. Acute bacterial conjunctivitis of right eye    - ofloxacin (OCUFLOX) 0.3 % Solution; Administer 1 Drop into both eyes 4 times a day for 7 days.  Dispense: 10 mL; Refill: 0        Medication discussed included indication for use and the potential benefits and side effects. Education was provided regarding the aforementioned assessments.  All of the patient's questions were answered to their satisfaction at the time of discharge. Patient was encouraged to monitor  symptoms closely. Those signs and symptoms which would warrant concern and mandate seeking a higher level of service through the emergency department discussed at length.  Patient stated agreement and understanding of this plan of care.    Disposition:  Home in stable condition       Voice Recognition Disclaimer:  Portions of this document were created using voice recognition software. The software does have a chance of producing errors of grammar and possibly content. I have made every reasonable attempt to correct obvious errors, but there may be errors of grammar and possibly content that I did not discover before finalizing the documentation.    FARRUKH Clarke.

## 2023-11-20 ENCOUNTER — TELEPHONE (OUTPATIENT)
Dept: VASCULAR LAB | Facility: MEDICAL CENTER | Age: 43
End: 2023-11-20
Payer: COMMERCIAL

## 2023-11-20 NOTE — TELEPHONE ENCOUNTER
[facility]     for pt to call clinic and establish care for DM pharmacotherapy.     Néstor uHgo, PharmD

## 2024-04-01 ENCOUNTER — OFFICE VISIT (OUTPATIENT)
Dept: URGENT CARE | Facility: CLINIC | Age: 44
End: 2024-04-01
Payer: COMMERCIAL

## 2024-04-01 VITALS
HEIGHT: 67 IN | HEART RATE: 91 BPM | BODY MASS INDEX: 38.36 KG/M2 | DIASTOLIC BLOOD PRESSURE: 72 MMHG | SYSTOLIC BLOOD PRESSURE: 120 MMHG | RESPIRATION RATE: 20 BRPM | TEMPERATURE: 97 F | WEIGHT: 244.4 LBS | OXYGEN SATURATION: 93 %

## 2024-04-01 DIAGNOSIS — J06.9 VIRAL URI WITH COUGH: ICD-10-CM

## 2024-04-01 LAB
FLUAV RNA SPEC QL NAA+PROBE: NEGATIVE
FLUBV RNA SPEC QL NAA+PROBE: NEGATIVE
RSV RNA SPEC QL NAA+PROBE: NEGATIVE
SARS-COV-2 RNA RESP QL NAA+PROBE: NEGATIVE

## 2024-04-01 PROCEDURE — 0241U POCT CEPHEID COV-2, FLU A/B, RSV - PCR: CPT

## 2024-04-01 PROCEDURE — 3078F DIAST BP <80 MM HG: CPT

## 2024-04-01 PROCEDURE — 99213 OFFICE O/P EST LOW 20 MIN: CPT

## 2024-04-01 PROCEDURE — 3074F SYST BP LT 130 MM HG: CPT

## 2024-04-01 RX ORDER — FLUTICASONE PROPIONATE 50 MCG
1 SPRAY, SUSPENSION (ML) NASAL DAILY
Qty: 16 G | Refills: 0 | Status: SHIPPED | OUTPATIENT
Start: 2024-04-01

## 2024-04-01 ASSESSMENT — ENCOUNTER SYMPTOMS
SPUTUM PRODUCTION: 0
HEMOPTYSIS: 0
WHEEZING: 1
CHILLS: 1
SHORTNESS OF BREATH: 0
SORE THROAT: 1
FEVER: 0
COUGH: 1

## 2024-04-01 ASSESSMENT — FIBROSIS 4 INDEX: FIB4 SCORE: 1.2

## 2024-04-01 NOTE — PROGRESS NOTES
Subjective:   Caridad Nino is a 43 y.o. female who presents for Congestion and Otalgia (Patients ear pain that started Saturday and got worse yesterday in right ear. Patient states congestion and sore thtroat. )      HPI: This is a 43-year-old female who presents today with viral URI symptoms.  Patient reports developing nasal congestion, sore throat, right ear pain, and chills.  She reports that her symptoms developed yesterday.  She has not take anything for her symptoms.  She reports some wheezing at night.  She does have an albuterol inhaler for asthma.  She has not used her inhaler.  She denies any fevers.  Patient some is also ill with viral symptoms.    Review of Systems   Constitutional:  Positive for chills. Negative for fever.   HENT:  Positive for congestion, ear pain and sore throat. Negative for ear discharge.    Respiratory:  Positive for cough and wheezing. Negative for hemoptysis, sputum production and shortness of breath.        Medications:    Current Outpatient Medications on File Prior to Visit   Medication Sig Dispense Refill    Semaglutide,0.25 or 0.5MG/DOS, 2 MG/1.5ML Solution Pen-injector Inject 0.25 mg every week on Sunday. 1.5 mL 3    rosuvastatin (CRESTOR) 5 MG Tab Take 1 Tablet by mouth every evening. 30 Tablet 11    metFORMIN (GLUCOPHAGE) 500 MG Tab TAKE 1 TABLET BY MOUTH TWICE A DAY WITH FOOD 180 Tablet 3    azithromycin (ZITHROMAX) 250 MG Tab Take two tablets on day one, then on tablet the following four days (Patient not taking: Reported on 10/29/2023) 6 Tablet 0    promethazine-dextromethorphan (PROMETHAZINE-DM) 6.25-15 MG/5ML syrup Take 5mls every six hours as needed for cough (Patient not taking: Reported on 10/29/2023) 180 mL 0    Continuous Blood Gluc Sensor (FREESTYLE MAXWELL 14 DAY SENSOR[place] [facility] [place] every 14 days 6 Each 3     No current facility-administered medications on file prior to visit.        Allergies:   Amoxicillin    Problem List:   Patient  "Active Problem List   Diagnosis    Morbid obesity due to excess calories (HCC)    Thrombocytopenia (HCC)    Type 2 diabetes mellitus with hyperglycemia, without long-term current use of insulin (HCC)    Normocytic anemia    Other hemorrhoids    Snoring    Tendinitis of wrist    Cervical radiculopathy    Dermatitis    Paronychia of left middle finger    Carpal tunnel syndrome of right wrist    Mixed hyperlipidemia    Obesity (BMI 30-39.9)        Surgical History:  Past Surgical History:   Procedure Laterality Date    CHOLECYSTECTOMY         Past Social Hx:   Social History     Tobacco Use    Smoking status: Former     Current packs/day: 0.00     Average packs/day: 1 pack/day for 27.3 years (27.3 ttl pk-yrs)     Types: Cigarettes     Start date: 1989     Quit date: 2016     Years since quittin.9    Smokeless tobacco: Never   Vaping Use    Vaping Use: Never used   Substance Use Topics    Alcohol use: Yes     Comment: rarely     Drug use: Yes     Types: Marijuana          Problem list, medications, and allergies reviewed by myself today in Epic.     Objective:     /72 (BP Location: Left arm, Patient Position: Sitting, BP Cuff Size: Large adult)   Pulse 91   Temp 36.1 °C (97 °F) (Temporal)   Resp 20   Ht 1.7 m (5' 6.93\")   Wt 111 kg (244 lb 6.4 oz)   SpO2 93%   BMI 38.36 kg/m²     Physical Exam  Vitals and nursing note reviewed.   Constitutional:       General: She is not in acute distress.     Appearance: Normal appearance. She is normal weight. She is not ill-appearing, toxic-appearing or diaphoretic.   HENT:      Head: Normocephalic and atraumatic.      Right Ear: Tympanic membrane, ear canal and external ear normal. There is no impacted cerumen.      Left Ear: Tympanic membrane, ear canal and external ear normal. There is no impacted cerumen.      Nose: Congestion present. No rhinorrhea.      Mouth/Throat:      Mouth: Mucous membranes are moist.      Pharynx: Oropharynx is clear. No " oropharyngeal exudate or posterior oropharyngeal erythema.   Cardiovascular:      Rate and Rhythm: Normal rate and regular rhythm.      Pulses: Normal pulses.      Heart sounds: Normal heart sounds. No murmur heard.     No friction rub. No gallop.   Pulmonary:      Effort: Pulmonary effort is normal. No respiratory distress.      Breath sounds: Normal breath sounds. No stridor. No wheezing, rhonchi or rales.   Chest:      Chest wall: No tenderness.   Musculoskeletal:      Cervical back: Neck supple. No tenderness.   Lymphadenopathy:      Cervical: No cervical adenopathy.   Skin:     General: Skin is warm and dry.      Capillary Refill: Capillary refill takes less than 2 seconds.   Neurological:      General: No focal deficit present.      Mental Status: She is alert and oriented to person, place, and time. Mental status is at baseline.      Cranial Nerves: No cranial nerve deficit.      Motor: No weakness.      Gait: Gait normal.   Psychiatric:         Mood and Affect: Mood normal.         Behavior: Behavior normal.         Thought Content: Thought content normal.         Judgment: Judgment normal.         Assessment/Plan:     Diagnosis and associated orders:   1. Viral URI with cough  fluticasone (FLONASE) 50 MCG/ACT nasal spray    POCT CoV-2, Flu A/B, RSV by PCR         Results for orders placed or performed in visit on 04/01/24   POCT CoV-2, Flu A/B, RSV by PCR   Result Value Ref Range    SARS-CoV-2 by PCR Negative Negative, Invalid    Influenza virus A RNA Negative Negative, Invalid    Influenza virus B, PCR Negative Negative, Invalid    RSV, PCR Negative Negative, Invalid          Comments/MDM:   Pt is clinically stable at today's acute urgent care visit.  No acute distress noted. Appropriate for outpatient management at this time.     Acute problem. Viral panel negative.   I have discussed with patient that symptoms are viral in etiology. I have recommended supportive care to include; Increased fluids, rest,  over-the-counter cough medications, flonase as prescribed, alternating Tylenol and/or ibuprofen per manufactures instructions for symptomatic relief and fevers, and the use of a humidifier. Patient is to return to UC or go to ER for any new or worsening s/s, and follow up with PCP for recheck. Patient is agreeable with plan of care and verbalized good understanding.               Discussed DDx, management options (risks,benefits, and alternatives to planned treatment), natural progression and supportive care.  Expressed understanding and the treatment plan was agreed upon. Questions were encouraged and answered   Return to urgent care prn if new or worsening sx or if there is no improvement in condition prn.    Educated in Red flags and indications to immediately call 911 or present to the Emergency Department.   Advised the patient to follow-up with the primary care physician for recheck, reevaluation, and consideration of further management.    I personally reviewed prior external notes and test results pertinent to today's visit.  I have independently reviewed and interpreted all diagnostics ordered during this urgent care acute visit.     Please note that this dictation was created using voice recognition software. I have made a reasonable attempt to correct obvious errors, but I expect that there are errors of grammar and possibly content that I did not discover before finalizing the note.    This note was electronically signed by ELA Hayden

## 2024-04-01 NOTE — LETTER
April 1, 2024    To Whom It May Concern:         This is confirmation that Caridad Raman Mica attended her scheduled appointment with GERMANIA Raymond on 4/01/24. Please excuse her from work 4/1/24-4/3/24.         If you have any questions please do not hesitate to call me at the phone number listed below.    Sincerely,          Mary Kirkland A.P.R.N.  [phone]

## 2024-06-10 ENCOUNTER — OFFICE VISIT (OUTPATIENT)
Dept: MEDICAL GROUP | Facility: MEDICAL CENTER | Age: 44
End: 2024-06-10
Payer: COMMERCIAL

## 2024-06-10 VITALS
DIASTOLIC BLOOD PRESSURE: 74 MMHG | WEIGHT: 234 LBS | HEIGHT: 67 IN | SYSTOLIC BLOOD PRESSURE: 114 MMHG | HEART RATE: 68 BPM | TEMPERATURE: 97 F | BODY MASS INDEX: 36.73 KG/M2 | OXYGEN SATURATION: 98 %

## 2024-06-10 DIAGNOSIS — G89.29 CHRONIC PAIN OF BOTH HIPS: ICD-10-CM

## 2024-06-10 DIAGNOSIS — E04.1 THYROID NODULE: ICD-10-CM

## 2024-06-10 DIAGNOSIS — M16.4 POST-TRAUMATIC OSTEOARTHRITIS OF BOTH HIPS: ICD-10-CM

## 2024-06-10 DIAGNOSIS — L20.9 ATOPIC DERMATITIS OF SCALP: ICD-10-CM

## 2024-06-10 DIAGNOSIS — E11.65 TYPE 2 DIABETES MELLITUS WITH HYPERGLYCEMIA, WITHOUT LONG-TERM CURRENT USE OF INSULIN (HCC): ICD-10-CM

## 2024-06-10 DIAGNOSIS — E78.2 MIXED HYPERLIPIDEMIA: ICD-10-CM

## 2024-06-10 DIAGNOSIS — Z11.59 ENCOUNTER FOR HEPATITIS C SCREENING TEST FOR LOW RISK PATIENT: ICD-10-CM

## 2024-06-10 DIAGNOSIS — M25.551 CHRONIC PAIN OF BOTH HIPS: ICD-10-CM

## 2024-06-10 DIAGNOSIS — M25.552 CHRONIC PAIN OF BOTH HIPS: ICD-10-CM

## 2024-06-10 DIAGNOSIS — F32.1 CURRENT MODERATE EPISODE OF MAJOR DEPRESSIVE DISORDER, UNSPECIFIED WHETHER RECURRENT (HCC): ICD-10-CM

## 2024-06-10 PROCEDURE — 3078F DIAST BP <80 MM HG: CPT | Performed by: PHYSICIAN ASSISTANT

## 2024-06-10 PROCEDURE — 3074F SYST BP LT 130 MM HG: CPT | Performed by: PHYSICIAN ASSISTANT

## 2024-06-10 PROCEDURE — 99214 OFFICE O/P EST MOD 30 MIN: CPT | Performed by: PHYSICIAN ASSISTANT

## 2024-06-10 RX ORDER — DULOXETIN HYDROCHLORIDE 30 MG/1
30 CAPSULE, DELAYED RELEASE ORAL DAILY
Qty: 30 CAPSULE | Refills: 5 | Status: SHIPPED | OUTPATIENT
Start: 2024-06-10

## 2024-06-10 RX ORDER — TRIAMCINOLONE ACETONIDE 1 MG/G
CREAM TOPICAL
Qty: 45 G | Refills: 1 | Status: SHIPPED | OUTPATIENT
Start: 2024-06-10

## 2024-06-10 RX ORDER — METHYLPREDNISOLONE 4 MG/1
TABLET ORAL
Qty: 21 TABLET | Refills: 0 | Status: SHIPPED | OUTPATIENT
Start: 2024-06-10

## 2024-06-10 ASSESSMENT — FIBROSIS 4 INDEX: FIB4 SCORE: 1.2

## 2024-06-10 NOTE — PROGRESS NOTES
Subjective:     History of Present Illness  The patient presents for evaluation of multiple medical concerns.    The patient reports experiencing a grinding sensation in her right hip during cold weather, accompanied by a popping sound. This sensation has been persistent since Tuesday. Approximately a month ago, she experienced a fall after tripping over a foot-high board, resulting in injuries to her entire leg. She suspected a blood clot because the bruise was so big, which has since resolved. 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 An MRI revealed spinal arthritis in cervical region    During her 4-month pregnancy, the patient experienced significant stress and was subsequently diagnosed with shingles in her head. She suspects the shingles have recurred, characterized by itching.    The patient's last laboratory tests were conducted in 10/2023. She reports a weight loss of 5 to 6 pounds since her last visit, attributing this to her reduced food intake. She is currently on Ozempic 0.25 mg and metformin.    The patient is also on medication for cholesterol management.      Current medicines (including changes today)  Current Outpatient Medications   Medication Sig Dispense Refill    triamcinolone acetonide (KENALOG) 0.1 % Cream Apply a pea-sized amount to the back of the scalp twice daily for 10 to 14 days 45 g 1    methylPREDNISolone (MEDROL DOSEPAK) 4 MG Tablet Therapy Pack As directed on the packaging label. 21 Tablet 0    DULoxetine (CYMBALTA) 30 MG Cap DR Particles Take 1 Capsule by mouth every day. 30 Capsule 5    fluticasone (FLONASE) 50 MCG/ACT nasal spray Administer 1 Spray into affected nostril(S) every day. 16 g 0    Semaglutide,0.25 or 0.5MG/DOS, 2 MG/1.5ML Solution Pen-injector Inject 0.25 mg every week on Sunday. 1.5 mL 3    rosuvastatin (CRESTOR) 5 MG Tab Take 1 Tablet by mouth  every evening. 30 Tablet 11    metFORMIN (GLUCOPHAGE) 500 MG Tab TAKE 1 TABLET BY MOUTH TWICE A DAY WITH FOOD 180 Tablet 3    Continuous Blood Gluc Sensor (FREESTYLE MAXWELL 14 DAY SENSOR) [facility] Apply one sensor every 14 days 6 Each 3     No current facility-administered medications for this visit.     She  has a past medical history of Cervical radiculopathy (1/18/2022), Dental disorder, Hemorrhoids (1998), History of shingles (June/July 2016), Hypertension (1997), Hypertension due to endocrine disorder (8/22/2019), Mixed hyperlipidemia (10/10/2022), Morbid obesity due to excess calories ([facility]) (12/3/2016), Normocytic anemia (8/15/2017), Pneumonia, Snoring, Thrombocytopenia ([facility]) (8/14/2017), Thyroid nodule, Type 2 diabetes mellitus without complication, without long-term current use of insulin ([facility]), and Vertigo (8/14/2017).    She has no past medical history of Addisons disease ([facility]), Allergy, Anesthesia, Anginal syndrome ([facility]), Anxiety, Arrhythmia, Arthritis, Blood clotting disorder ([facility]), Blood transfusion without reported diagnosis, Bowel habit changes, Breath shortness, Carcinoma in situ of respiratory system, Cataract, Clotting disorder ([facility]), Cold, Continuous ambulatory peritoneal dialysis status ([facility]), Coughing blood, Cushings syndrome ([facility]), Depression, Diabetic neuropathy ([facility]), Dialysis patient ([facility]), Encounter for long-term (current) use of other medications, GERD (gastroesophageal reflux disease), Glaucoma, Gynecological disorder, Heart attack ([facility]), Heart murmur, Heart valve disease, Hemorrhagic disorder ([facility]), Hepatitis A, Hepatitis B, Hepatitis C, Hiatus hernia syndrome, High cholesterol, HIV (human immunodeficiency virus infection) ([facility]), IBD (inflammatory bowel disease), Indigestion, Jaundice, Meningitis, Migraine, Osteoporosis, Pacemaker, Parathyroid disorder ([facility]), Pituitary disease ([facility]), Pulmonary emphysema ([facility]), Rheumatic fever, Seizure ([facility]), Sickle cell disease ([facility]), Sleep apnea, Substance  "abuse (HCC), Tuberculosis, Urinary bladder disorder, or Urinary incontinence.    ROS   No chest pain, no shortness of breath, no abdominal pain  Positive ROS as per HPI.  All other systems reviewed and are negative.     Objective:     /74   Pulse 68   Temp 36.1 °C (97 °F) (Temporal)   Ht 1.701 m (5' 6.98\")   Wt 106 kg (234 lb)   SpO2 98%  Body mass index is 36.67 kg/m².   Physical Exam  Vital Signs  Patient's weight is 234 pounds.  Constitutional: Alert, no distress.  Skin: Warm, dry, good turgor, no rashes in visible areas.  Eye: Equal, round and reactive, conjunctiva clear, lids normal.  ENMT: Lips without lesions, good dentition, oropharynx clear.  Neck: Trachea midline, no masses, no thyromegaly. No cervical or supraclavicular lymphadenopathy  Respiratory: Unlabored respiratory effort, lungs clear to auscultation, no wheezes, no ronchi.  Cardiovascular: Normal S1, S2, no murmur, no edema.  Abdomen: Soft, non-tender, no masses, no hepatosplenomegaly.  Psych: Alert and oriented x3, normal affect and mood.      Results          Assessment and Plan:   The following treatment plan was discussed    Assessment & Plan  1. Type 2 diabetes.  This is a chronic condition that is completely uncontrolled. After discussing with the patient, I ordered labs for her in 10/2023. She must complete them, or I will not be able to refill her medications. She plans to complete them. If all results are unremarkable, I will then increase her Ozempic dose at the next visit in 4 weeks.    2. Chronic bilateral hip pain.  This is likely secondary to osteoarthritis, and her weight is contributing to this. A comprehensive discussion regarding diet and lifestyle modifications was conducted. I plan to order a raise up the hips to ascertain if indeed osteoarthritis is playing a role. If necessary, we can refer her to specialty care at the next visit.    3. Major depressive disorder.  This condition is currently active and not treated. " Given her chronic pain syndrome and untreated depression, duloxetine will be beneficial. I will start her on a low dose and have her follow up in 4 weeks. The side effect profile was discussed.    4. Atopic dermatitis of the scalp.  She has a small area on the back of her scalp that appears to be atopic dermatosis. I will treat her with triamcinolone twice a day for 2 weeks and have her follow up.    5. Incidental thyroid nodule.  This was found on imaging, measuring 1.3 cm x 1.1 cm. A follow-up with an ultrasound is necessary, as it was found via CT of her cervical spine.    6. Mixed hyperlipidemia.  This is chronic and likely unstable, but she is on Crestor 5 mg. I will add a lipid to her labs that she has not done, so I can better ascertain her treatment.    Follow-up  A follow-up appointment is scheduled for 4 weeks from now.      ORDERS:  1. Chronic pain of both hips    - DX-HIP-BILATERAL-WITH PELVIS-2 VIEWS; Future  - Referral to Pain Management  - methylPREDNISolone (MEDROL DOSEPAK) 4 MG Tablet Therapy Pack; As directed on the packaging label.  Dispense: 21 Tablet; Refill: 0  - DULoxetine (CYMBALTA) 30 MG Cap DR Particles; Take 1 Capsule by mouth every day.  Dispense: 30 Capsule; Refill: 5    2. Type 2 diabetes mellitus with hyperglycemia, without long-term current use of insulin (HCC)    - Comp Metabolic Panel; Future  - HEMOGLOBIN A1C; Future  - Micoalbumin Creat Ratio Urine; Future    3. Encounter for hepatitis C screening test for low risk patient    - HEP C VIRUS ANTIBODY; Future    4. Atopic dermatitis of scalp    - triamcinolone acetonide (KENALOG) 0.1 % Cream; Apply a pea-sized amount to the back of the scalp twice daily for 10 to 14 days  Dispense: 45 g; Refill: 1    5. Thyroid nodule    - US-THYROID; Future    6. Mixed hyperlipidemia    - Lipid Profile; Future    7. Post-traumatic osteoarthritis of both hips      8. Current moderate episode of major depressive disorder, unspecified whether recurrent  (HCC)    - DULoxetine (CYMBALTA) 30 MG Cap DR Particles; Take 1 Capsule by mouth every day.  Dispense: 30 Capsule; Refill: 5      Anticipatory guidance discussed at length including regular daily exercise with a goal of 150 minutes a week.  Recommendation to eat the Mediterranean diet to decrease cardiovascular risk.  Encouraged avoiding high fructose corn syrup and other simple sugars to reduce risk of obesity and type 2 diabetes.    Follow Up: 4 weeks    Please note that this dictation was created using voice recognition software. I have made every reasonable attempt to correct obvious errors, but I expect that there are errors of grammar and possibly content that I did not discover before finalizing the note.      Attestation      Verbal consent was acquired by the patient to use [employer] ambient listening note generation during this visit Yes

## 2024-06-12 ENCOUNTER — HOSPITAL ENCOUNTER (OUTPATIENT)
Dept: LAB | Facility: MEDICAL CENTER | Age: 44
End: 2024-06-12
Attending: PHYSICIAN ASSISTANT
Payer: COMMERCIAL

## 2024-06-12 DIAGNOSIS — Z11.59 ENCOUNTER FOR HEPATITIS C SCREENING TEST FOR LOW RISK PATIENT: ICD-10-CM

## 2024-06-12 DIAGNOSIS — E78.2 MIXED HYPERLIPIDEMIA: ICD-10-CM

## 2024-06-12 DIAGNOSIS — E11.65 TYPE 2 DIABETES MELLITUS WITH HYPERGLYCEMIA, WITHOUT LONG-TERM CURRENT USE OF INSULIN (HCC): ICD-10-CM

## 2024-06-12 LAB
ALBUMIN SERPL BCP-MCNC: 4.3 G/DL (ref 3.2–4.9)
ALBUMIN/GLOB SERPL: 1.3 G/DL
ALP SERPL-CCNC: 71 U/L (ref 30–99)
ALT SERPL-CCNC: 16 U/L (ref 2–50)
ANION GAP SERPL CALC-SCNC: 12 MMOL/L (ref 7–16)
AST SERPL-CCNC: 14 U/L (ref 12–45)
BILIRUB SERPL-MCNC: 0.4 MG/DL (ref 0.1–1.5)
BUN SERPL-MCNC: 11 MG/DL (ref 8–22)
CALCIUM ALBUM COR SERPL-MCNC: 9.6 MG/DL (ref 8.5–10.5)
CALCIUM SERPL-MCNC: 9.8 MG/DL (ref 8.5–10.5)
CHLORIDE SERPL-SCNC: 102 MMOL/L (ref 96–112)
CHOLEST SERPL-MCNC: 145 MG/DL (ref 100–199)
CO2 SERPL-SCNC: 24 MMOL/L (ref 20–33)
CREAT SERPL-MCNC: 0.65 MG/DL (ref 0.5–1.4)
CREAT UR-MCNC: 115.73 MG/DL
EST. AVERAGE GLUCOSE BLD GHB EST-MCNC: 171 MG/DL
GFR SERPLBLD CREATININE-BSD FMLA CKD-EPI: 110 ML/MIN/1.73 M 2
GLOBULIN SER CALC-MCNC: 3.3 G/DL (ref 1.9–3.5)
GLUCOSE SERPL-MCNC: 171 MG/DL (ref 65–99)
HBA1C MFR BLD: 7.6 % (ref 4–5.6)
HCV AB SER QL: NORMAL
HDLC SERPL-MCNC: 44 MG/DL
LDLC SERPL CALC-MCNC: 83 MG/DL
MICROALBUMIN UR-MCNC: 9.6 MG/DL
MICROALBUMIN/CREAT UR: 83 MG/G (ref 0–30)
POTASSIUM SERPL-SCNC: 4.8 MMOL/L (ref 3.6–5.5)
PROT SERPL-MCNC: 7.6 G/DL (ref 6–8.2)
SODIUM SERPL-SCNC: 138 MMOL/L (ref 135–145)
TRIGL SERPL-MCNC: 90 MG/DL (ref 0–149)

## 2024-06-12 PROCEDURE — 80053 COMPREHEN METABOLIC PANEL: CPT

## 2024-06-12 PROCEDURE — 86803 HEPATITIS C AB TEST: CPT

## 2024-06-12 PROCEDURE — 82043 UR ALBUMIN QUANTITATIVE: CPT

## 2024-06-12 PROCEDURE — 36415 COLL VENOUS BLD VENIPUNCTURE: CPT

## 2024-06-12 PROCEDURE — 82570 ASSAY OF URINE CREATININE: CPT

## 2024-06-12 PROCEDURE — 83036 HEMOGLOBIN GLYCOSYLATED A1C: CPT

## 2024-06-12 PROCEDURE — 80061 LIPID PANEL: CPT

## 2024-06-20 ENCOUNTER — HOSPITAL ENCOUNTER (OUTPATIENT)
Dept: RADIOLOGY | Facility: MEDICAL CENTER | Age: 44
End: 2024-06-20
Attending: PHYSICIAN ASSISTANT
Payer: COMMERCIAL

## 2024-06-20 DIAGNOSIS — M25.552 CHRONIC PAIN OF BOTH HIPS: ICD-10-CM

## 2024-06-20 DIAGNOSIS — G89.29 CHRONIC PAIN OF BOTH HIPS: ICD-10-CM

## 2024-06-20 DIAGNOSIS — E04.1 THYROID NODULE: ICD-10-CM

## 2024-06-20 DIAGNOSIS — M25.551 CHRONIC PAIN OF BOTH HIPS: ICD-10-CM

## 2024-06-20 PROCEDURE — 73521 X-RAY EXAM HIPS BI 2 VIEWS: CPT

## 2024-06-20 PROCEDURE — 76536 US EXAM OF HEAD AND NECK: CPT

## 2024-06-24 DIAGNOSIS — E11.65 TYPE 2 DIABETES MELLITUS WITH HYPERGLYCEMIA, WITHOUT LONG-TERM CURRENT USE OF INSULIN (HCC): ICD-10-CM

## 2024-06-24 NOTE — TELEPHONE ENCOUNTER
Received request via: Pharmacy    Was the patient seen in the last year in this department? Yes    Does the patient have an active prescription (recently filled or refills available) for medication(s) requested? No    [facility]    Does the patient have assisted Plus and need 100 day supply (blood pressure, diabetes and cholesterol meds only)? Patient does not have SCP

## 2024-06-25 ENCOUNTER — OFFICE VISIT (OUTPATIENT)
Dept: PHYSICAL MEDICINE AND REHAB | Facility: MEDICAL CENTER | Age: 44
End: 2024-06-25
Payer: COMMERCIAL

## 2024-06-25 VITALS
DIASTOLIC BLOOD PRESSURE: 79 MMHG | TEMPERATURE: 97 F | HEART RATE: 80 BPM | BODY MASS INDEX: 36.73 KG/M2 | OXYGEN SATURATION: 94 % | SYSTOLIC BLOOD PRESSURE: 129 MMHG | WEIGHT: 234.35 LBS

## 2024-06-25 DIAGNOSIS — M16.0 PRIMARY OSTEOARTHRITIS OF BOTH HIPS: ICD-10-CM

## 2024-06-25 DIAGNOSIS — M70.71 ILIOPSOAS BURSITIS OF RIGHT HIP: ICD-10-CM

## 2024-06-25 DIAGNOSIS — M70.72 ILIOPSOAS BURSITIS OF LEFT HIP: ICD-10-CM

## 2024-06-25 DIAGNOSIS — M76.899 HIP FLEXOR TENDINITIS, UNSPECIFIED LATERALITY: ICD-10-CM

## 2024-06-25 DIAGNOSIS — E11.65 TYPE 2 DIABETES MELLITUS WITH HYPERGLYCEMIA, WITHOUT LONG-TERM CURRENT USE OF INSULIN (HCC): ICD-10-CM

## 2024-06-25 ASSESSMENT — PATIENT HEALTH QUESTIONNAIRE - PHQ9
CLINICAL INTERPRETATION OF PHQ2 SCORE: 3
SUM OF ALL RESPONSES TO PHQ QUESTIONS 1-9: 8
5. POOR APPETITE OR OVEREATING: 1 - SEVERAL DAYS

## 2024-06-25 ASSESSMENT — FIBROSIS 4 INDEX: FIB4 SCORE: 1.016129032258064516

## 2024-06-25 ASSESSMENT — PAIN SCALES - GENERAL: PAINLEVEL: 5=MODERATE PAIN

## 2024-06-25 NOTE — PROGRESS NOTES
New Patient Note    Interventional Pain and Spine  Physiatry (Physical Medicine and Rehabilitation)     Patient Name: Caridad Nino  : 1980  Date of Service: 2024  PCP: Ivania Prince P.A.-C.  Referring Provider: Ivania Prince P.A.-C.    Chief Complaint:   Chief Complaint   Patient presents with    New Patient     Chronic pain of both hips       HPI  HISTORY FROM INITIAL VISIT (2024):  Caridad Nino is a 43 y.o. female who presents today with bilateral anterior hip pain that feels grinding. Feels like she is going to fall if she walks fast. Impairs her ability to walk as far as she would like. Worse in the cold. Right sided has been in pain for 3-4 years. Left side started about 1 month ago. Pain is random. Some days it is better, some days it is excruciating.     Pain right now is 8-9/10 on the numeric pain scale. Her pain at its best-worse level during the course of the day is typically 8-9/10, respectively.  Pain worsens with standing, walking, side bending or twisting, and walking upstairs and improves with sitting and laying down. Her pain interferes somewhat with ADLs. The patient otherwise denies radiating pain, new focal weakness, numbness, or bladder/bowel incontinence.     The patient has not done a provider driven physical therapy program for this problem.    Patient has tried the following medications with varied success (current meds in bold):   Oral steroids-no relief  Duloxetine-unsure relief    Therapeutic modalities and interventional therapies to date include:  -No targeted injections for this problem    Psychological testing for pain as depression and pain commonly coexist and need to both be treated.     Opioid Risk Score: 14      Interpretation of Opioid Risk Score   Score 0-3 = Low risk of abuse. Do UDS at least once per year.  Score 4-7 = Moderate risk of abuse. Do UDS 1-4 times per year.  Score 8+ = High risk of abuse. Refer to specialist.    PHQ       4/22/2022     2:20 PM 10/16/2023     9:20 AM 6/25/2024    11:20 AM   Depression Screen (PHQ-2/PHQ-9)   PHQ-2 Total Score 3 0 3   PHQ-9 Total Score 10  8       Interpretation of PHQ-9 Total Score   Score Severity   1-4 No Depression   5-9 Mild Depression   10-14 Moderate Depression   15-19 Moderately Severe Depression   20-27 Severe Depression      Medical records review:  I reviewed the note from the referring provider Ivania Prince P.A.-C. including the note dated 6/10/2024.    ROS:   Red Flags ROS:   Fever, Chills, Sweats: Denies  Involuntary Weight Loss: Denies  Bladder Incontinence: Denies  Bowel Incontinence: denies  Saddle Anesthesia: Denies    All other systems reviewed and negative.     PMHx:   Past Medical History:   Diagnosis Date    Cervical radiculopathy 1/18/2022    Dental disorder     Hemorrhoids 1998    History of shingles June/July 2016    Hypertension 1997    JUST DURING PREGNANCY    Hypertension due to endocrine disorder 8/22/2019    Mixed hyperlipidemia 10/10/2022    Morbid obesity due to excess calories (HCC) 12/3/2016    Normocytic anemia 8/15/2017    Pneumonia     Snoring     Thrombocytopenia (HCC) 8/14/2017    Thyroid nodule     Type 2 diabetes mellitus without complication, without long-term current use of insulin (HCC)     Vertigo 8/14/2017       PSHx:   Past Surgical History:   Procedure Laterality Date    CHOLECYSTECTOMY  1998       Family Hx:   Family History   Problem Relation Age of Onset    Diabetes Mother     Other Mother         MS    Cancer Mother         colon    Cancer Sister         throat    Alcohol abuse Paternal Grandmother        Social Hx:  Social History     Socioeconomic History    Marital status:      Spouse name: Not on file    Number of children: Not on file    Years of education: Not on file    Highest education level: Not on file   Occupational History    Not on file   Tobacco Use    Smoking status: Former     Current packs/day: 0.00     Average packs/day: 1  pack/day for 27.3 years (27.3 ttl pk-yrs)     Types: Cigarettes     Start date: 1989     Quit date: 2016     Years since quittin.2    Smokeless tobacco: Never   Vaping Use    Vaping status: Never Used   Substance and Sexual Activity    Alcohol use: Yes     Comment: rarely     Drug use: Yes     Types: Marijuana    Sexual activity: Yes     Partners: Male   Other Topics Concern    Not on file   Social History Narrative    Not on file     Social Determinants of Health     Financial Resource Strain: Not on file   Food Insecurity: Not on file   Transportation Needs: Not on file   Physical Activity: Not on file   Stress: Not on file   Social Connections: Not on file   Intimate Partner Violence: Not on file   Housing Stability: Not on file       Allergies:  Allergies   Allergen Reactions    Amoxicillin Hives       Medications: reviewed on epic.   Outpatient Medications Marked as Taking for the 24 encounter (Office Visit) with Laura Lewis M.D.   Medication Sig Dispense Refill    triamcinolone acetonide (KENALOG) 0.1 % Cream Apply a pea-sized amount to the back of the scalp twice daily for 10 to 14 days 45 g 1    methylPREDNISolone (MEDROL DOSEPAK) 4 MG Tablet Therapy Pack As directed on the packaging label. 21 Tablet 0    DULoxetine (CYMBALTA) 30 MG Cap DR Particles Take 1 Capsule by mouth every day. 30 Capsule 5    fluticasone (FLONASE) 50 MCG/ACT nasal spray Administer 1 Spray into affected nostril(S) every day. 16 g 0    Semaglutide,0.25 or 0.5MG/DOS, 2 MG/1.5ML Solution Pen-injector Inject 0.25 mg every week on . 1.5 mL 3    rosuvastatin (CRESTOR) 5 MG Tab Take 1 Tablet by mouth every evening. 30 Tablet 11    metFORMIN (GLUCOPHAGE) 500 MG Tab TAKE 1 TABLET BY MOUTH TWICE A DAY WITH FOOD 180 Tablet 3    Continuous Blood Gluc Sensor (FREESTYLE MAXWELL 14 DAY SENSOR) Misc Apply one sensor every 14 days 6 Each 3        Current Outpatient Medications on File Prior to Visit   Medication Sig Dispense  Refill    triamcinolone acetonide (KENALOG) 0.1 % Cream Apply a pea-sized amount to the back of the scalp twice daily for 10 to 14 days 45 g 1    methylPREDNISolone (MEDROL DOSEPAK) 4 MG Tablet Therapy Pack As directed on the packaging label. 21 Tablet 0    DULoxetine (CYMBALTA) 30 MG Cap DR Particles Take 1 Capsule by mouth every day. 30 Capsule 5    fluticasone (FLONASE) 50 MCG/ACT nasal spray Administer 1 Spray into affected nostril(S) every day. 16 g 0    Semaglutide,0.25 or 0.5MG/DOS, 2 MG/1.5ML Solution Pen-injector Inject 0.25 mg every week on Sunday. 1.5 mL 3    rosuvastatin (CRESTOR) 5 MG Tab Take 1 Tablet by mouth every evening. 30 Tablet 11    metFORMIN (GLUCOPHAGE) 500 MG Tab TAKE 1 TABLET BY MOUTH TWICE A DAY WITH FOOD 180 Tablet 3    Continuous Blood Gluc Sensor (Underground CellarSTYLE MAXWELL 14 DAY SENSOR) Misc Apply one sensor every 14 days 6 Each 3     No current facility-administered medications on file prior to visit.         EXAMINATION     Physical Exam:   /79 (BP Location: Right arm, Patient Position: Sitting, BP Cuff Size: Adult)   Pulse 80   Temp 36.1 °C (97 °F) (Temporal)   Wt 106 kg (234 lb 5.6 oz)   SpO2 94%     Constitutional:   Body Habitus: Body mass index is 36.73 kg/m².  Cooperation: Fully cooperates with exam  Appearance: Well-groomed, well-nourished.    Eyes: No scleral icterus to suggest severe liver disease, no proptosis to suggest severe hyperthyroidism    ENT -no obvious auditory deficits, no noticeable facial droop     Skin -no rashes or lesions noted     Respiratory-  breathing comfortably on room air, no audible wheezing    Cardiovascular-distal extremities warm and well perfused.  No lower extremity edema is noted.     Gastrointestinal - no obvious abdominal masses, non-distended    Psychiatric- alert and oriented ×3. Normal affect.     Gait - normal gait, no use of ambulatory device, nonantalgic.     Musculoskeletal and Neuro -     Thoracic/Lumbar Spine/Sacral Spine/Hips    Inspection: No evidence of atrophy in bilateral lower extremities throughout     Tenderness to palpation at the bilateral iliopsoas bursa    Lumbar spine /hip provocative exam maneuvers  Straight leg raise negative bilaterally  FADIR test negative bilaterally    SI joint tests  SUSAN test negative bilaterally    Key points for the international standards for neurological classification of spinal cord injury (ISNCSCI) to light touch.   Dermatome R L   L2 2 2   L3 2 2   L4 2 1 in thigh   L5 2 1 in thigh   S1 2 2   S2 2 2       Motor Exam Lower Extremities  ? Myotome R L   Hip flexion L2 4+* 4+*   Knee extension L3 5 5   Ankle dorsiflexion L4 5 5   Toe extension L5 5 5   Ankle plantarflexion S1 5 5   *Pain reproduced with resisted hip flexion    Reflexes  ?  R L   Patella  2+ 2+   Achilles   2+ 2+     Clonus of the ankle negative bilaterally       MEDICAL DECISION MAKING    Medical records review: see under HPI section.     DATA    Labs: Personally reviewed at today's visit:     Lab Results   Component Value Date/Time    SODIUM 138 06/12/2024 09:47 AM    POTASSIUM 4.8 06/12/2024 09:47 AM    CHLORIDE 102 06/12/2024 09:47 AM    CO2 24 06/12/2024 09:47 AM    ANION 12.0 06/12/2024 09:47 AM    GLUCOSE 171 (H) 06/12/2024 09:47 AM    BUN 11 06/12/2024 09:47 AM    CREATININE 0.65 06/12/2024 09:47 AM    CALCIUM 9.8 06/12/2024 09:47 AM    ASTSGOT 14 06/12/2024 09:47 AM    ALTSGPT 16 06/12/2024 09:47 AM    TBILIRUBIN 0.4 06/12/2024 09:47 AM    ALBUMIN 4.3 06/12/2024 09:47 AM    TOTPROTEIN 7.6 06/12/2024 09:47 AM    GLOBULIN 3.3 06/12/2024 09:47 AM    AGRATIO 1.3 06/12/2024 09:47 AM       Lab Results   Component Value Date/Time    PROTHROMBTM 13.0 08/14/2017 01:17 PM    INR 0.95 08/14/2017 01:17 PM        Lab Results   Component Value Date/Time    WBC 6.3 09/13/2022 10:11 AM    RBC 4.78 09/13/2022 10:11 AM    HEMOGLOBIN 13.7 09/13/2022 10:11 AM    HEMATOCRIT 43.5 09/13/2022 10:11 AM    MCV 91.0 09/13/2022 10:11 AM    MCH 28.7  2022 10:11 AM    MCHC 31.5 (L) 2022 10:11 AM    MPV 12.6 2022 10:11 AM    NEUTSPOLYS 55.60 2022 10:11 AM    LYMPHOCYTES 33.50 2022 10:11 AM    MONOCYTES 8.00 2022 10:11 AM    EOSINOPHILS 1.80 2022 10:11 AM    BASOPHILS 0.50 2022 10:11 AM        Lab Results   Component Value Date/Time    HBA1C 7.6 (H) 2024 09:47 AM        Imaging:   I personally reviewed following images, these are my reads  X-ray pelvis 2024  Mild OA of bilateral hip joints.  See formal radiology report for further details.          IMAGING radiology reads. I reviewed the following radiology reads                  Results for orders placed in visit on 19    MR-CERVICAL SPINE-W/O    Addendum 2019 10:44 AM  Addendum:  At the level of C5-6 there is right paracentral disc protrusion causing effacement of the right lateral recess. There is indentation of the right side of the spinal cord contour at this level. There is mild right C6 neural foraminal stenosis.    Impression  1.  Degenerative disease in the cervical spine as described above.  2.  There is loss of cervical lordosis with kyphotic angulation at C6. This may be positional in nature.  3.  There is an approximately 1.3 x 1 cm sized nodule in the right lobe of the thyroid. Ultrasound examination is recommended for further evaluation.                                                    Results for orders placed during the hospital encounter of 22    DX-CHEST-2 VIEWS    Impression  No evidence of acute cardiopulmonary process.                                             Diagnosis  Visit Diagnoses     ICD-10-CM   1. Hip flexor tendinitis, unspecified laterality  M76.899   2. Iliopsoas bursitis of left hip  M70.72   3. Primary osteoarthritis of both hips  M16.0   4. Iliopsoas bursitis of right hip  M70.71         ASSESSMENT AND PLAN:  Caridad Nino ( 1980) is a female        Caridad was seen today for new  patient.    Diagnoses and all orders for this visit:    Hip flexor tendinitis, unspecified laterality  -     Referral to Physical Therapy    Iliopsoas bursitis of left hip  -     Referral to Pain Clinic  -     Referral to Physical Therapy    Primary osteoarthritis of both hips  -     Referral to Physical Therapy    Iliopsoas bursitis of right hip  -     Referral to Pain Clinic  -     Referral to Physical Therapy          PLAN  Physical Therapy: I ordered physical therapy to focus on strengthening and stretching as well as a home exercise program.     Diagnostic workup: Personally reviewed at today's visit: X-ray pelvis 6/20/2024    Medications:   -Okay to continue duloxetine as per PCP  -S/p oral steroids without significant relief    Interventions:   -Bilateral iliopsoas bursa steroid injections under ultrasound guidance. The risks, benefits, and alternatives to this procedure were discussed and the patient wishes to proceed with the procedure. Risks include but are not limited to damage to surrounding structures, infection, bleeding, worsening of pain which can be permanent, and weakness which can be permanent. Benefits include pain relief and improved function. Alternatives include not doing the procedure.      Follow-up: Next available for injections    Orders Placed This Encounter    Referral to Pain Clinic    Referral to Physical Therapy       Laura Lewis MD  Interventional Pain and Spine  Physical Medicine and Rehabilitation  [facility]    CC Ivania Prince, P.A.-BRIANNA.     The above note documents my personal evaluation of this patient. In addition, I have reviewed and confirmed with the patient and MA the supportive information documented in today's Patient Health Questionnaire and Office Note.     Please note that this dictation was created using voice recognition software. I have made every reasonable attempt to correct obvious errors, but I expect that there are errors of grammar and possibly  content that I did not discover before finalizing the note.

## 2024-07-09 ENCOUNTER — OFFICE VISIT (OUTPATIENT)
Dept: PHYSICAL MEDICINE AND REHAB | Facility: MEDICAL CENTER | Age: 44
End: 2024-07-09
Payer: COMMERCIAL

## 2024-07-09 VITALS
WEIGHT: 235.89 LBS | DIASTOLIC BLOOD PRESSURE: 82 MMHG | HEART RATE: 84 BPM | BODY MASS INDEX: 36.97 KG/M2 | TEMPERATURE: 97.3 F | SYSTOLIC BLOOD PRESSURE: 142 MMHG | OXYGEN SATURATION: 92 %

## 2024-07-09 DIAGNOSIS — M70.72 ILIOPSOAS BURSITIS OF LEFT HIP: ICD-10-CM

## 2024-07-09 DIAGNOSIS — M70.71 ILIOPSOAS BURSITIS OF RIGHT HIP: ICD-10-CM

## 2024-07-09 PROCEDURE — 20611 DRAIN/INJ JOINT/BURSA W/US: CPT | Mod: 50 | Performed by: STUDENT IN AN ORGANIZED HEALTH CARE EDUCATION/TRAINING PROGRAM

## 2024-07-09 PROCEDURE — 3077F SYST BP >= 140 MM HG: CPT | Performed by: STUDENT IN AN ORGANIZED HEALTH CARE EDUCATION/TRAINING PROGRAM

## 2024-07-09 PROCEDURE — 1125F AMNT PAIN NOTED PAIN PRSNT: CPT | Performed by: STUDENT IN AN ORGANIZED HEALTH CARE EDUCATION/TRAINING PROGRAM

## 2024-07-09 PROCEDURE — 3079F DIAST BP 80-89 MM HG: CPT | Performed by: STUDENT IN AN ORGANIZED HEALTH CARE EDUCATION/TRAINING PROGRAM

## 2024-07-09 RX ORDER — DEXAMETHASONE SODIUM PHOSPHATE 4 MG/ML
4 INJECTION, SOLUTION INTRA-ARTICULAR; INTRALESIONAL; INTRAMUSCULAR; INTRAVENOUS; SOFT TISSUE ONCE
Status: COMPLETED | OUTPATIENT
Start: 2024-07-09 | End: 2024-07-09

## 2024-07-09 RX ORDER — MELOXICAM 15 MG/1
15 TABLET ORAL
Qty: 30 TABLET | Refills: 2 | Status: SHIPPED | OUTPATIENT
Start: 2024-07-09

## 2024-07-09 RX ADMIN — DEXAMETHASONE SODIUM PHOSPHATE 4 MG: 4 INJECTION, SOLUTION INTRA-ARTICULAR; INTRALESIONAL; INTRAMUSCULAR; INTRAVENOUS; SOFT TISSUE at 12:56

## 2024-07-09 RX ADMIN — DEXAMETHASONE SODIUM PHOSPHATE 4 MG: 4 INJECTION, SOLUTION INTRA-ARTICULAR; INTRALESIONAL; INTRAMUSCULAR; INTRAVENOUS; SOFT TISSUE at 12:58

## 2024-07-09 ASSESSMENT — PAIN SCALES - GENERAL: PAINLEVEL: 5=MODERATE PAIN

## 2024-07-09 ASSESSMENT — FIBROSIS 4 INDEX: FIB4 SCORE: 1.04

## 2024-07-09 ASSESSMENT — PATIENT HEALTH QUESTIONNAIRE - PHQ9: CLINICAL INTERPRETATION OF PHQ2 SCORE: 0

## 2024-07-25 ENCOUNTER — OFFICE VISIT (OUTPATIENT)
Dept: MEDICAL GROUP | Facility: MEDICAL CENTER | Age: 44
End: 2024-07-25
Payer: COMMERCIAL

## 2024-07-25 VITALS
BODY MASS INDEX: 37.93 KG/M2 | OXYGEN SATURATION: 93 % | WEIGHT: 236 LBS | HEART RATE: 100 BPM | DIASTOLIC BLOOD PRESSURE: 72 MMHG | TEMPERATURE: 97 F | SYSTOLIC BLOOD PRESSURE: 118 MMHG | HEIGHT: 66 IN

## 2024-07-25 DIAGNOSIS — E07.9 THYROID MASS: ICD-10-CM

## 2024-07-25 DIAGNOSIS — E04.1 THYROID NODULE GREATER THAN OR EQUAL TO 1.5 CM IN DIAMETER INCIDENTALLY NOTED ON IMAGING STUDY: ICD-10-CM

## 2024-07-25 DIAGNOSIS — E11.65 TYPE 2 DIABETES MELLITUS WITH HYPERGLYCEMIA, WITHOUT LONG-TERM CURRENT USE OF INSULIN (HCC): ICD-10-CM

## 2024-07-25 DIAGNOSIS — E04.1 THYROID NODULE: ICD-10-CM

## 2024-07-25 ASSESSMENT — FIBROSIS 4 INDEX: FIB4 SCORE: 1.04

## 2024-08-08 NOTE — PROGRESS NOTES
Follow-up patient Note    Interventional Pain and Spine  Physiatry (Physical Medicine and Rehabilitation)     Patient Name: Caridad Nino  : 1980  Date of service: 2024    Chief Complaint:   Chief Complaint   Patient presents with    Follow-Up     Iliopsoas bursitis of left hip       HISTORY  Please see new patient note by Dr. Lewis for more details.     HPI  Today's visit   Caridad Nino ( 1980) is a female with Diagnoses of Iliopsoas bursitis of left hip, Iliopsoas bursitis of right hip, Hip flexor tendinitis, unspecified laterality, and Primary osteoarthritis of both hips were pertinent to this visit.    Presents today after  24  right and left iliopsoas bursa injection ultrasound-guided with significant relief of pain.  No longer interfering with ability to perform ADLs.      History of Present Illness  The patient is a 44-year-old who presents for follow-up after bilateral iliopsoas bursa injections. She is accompanied by her .    She reports an improvement in her condition following the bilateral iliopsoas bursa injections. She experiences intermittent pain, which is less severe than before. The pain intensifies after walking for several hours, which she attributes to being on her feet all day and working in a warehouse at night. To manage the pain, she has been taking acetaminophen 500 mg, two tablets between 6:00 and 8:00 PM, which seems to be helping. She occasionally experiences mild pressure. She has not scheduled any therapy due to her busy schedule and school commitments.       Procedure history:  - 24  right and left iliopsoas bursa injection ultrasound-guided -significant pain relief      ROS:   Red Flags ROS:   Fever, Chills, Sweats: Denies  Involuntary Weight Loss: Denies  Bladder Incontinence: Denies  Bowel Incontinence: denies  Saddle Anesthesia: Denies    All other systems reviewed and negative.     PMHx:   Past Medical History:   Diagnosis  Date    Cervical radiculopathy 2022    Dental disorder     Hemorrhoids 1998    History of shingles 2016    Hypertension 1997    JUST DURING PREGNANCY    Hypertension due to endocrine disorder 2019    Mixed hyperlipidemia 10/10/2022    Morbid obesity due to excess calories (HCC) 12/3/2016    Normocytic anemia 8/15/2017    Pneumonia     Snoring     Thrombocytopenia (HCC) 2017    Thyroid nodule     Type 2 diabetes mellitus without complication, without long-term current use of insulin (HCC)     Vertigo 2017       PSHx:   Past Surgical History:   Procedure Laterality Date    CHOLECYSTECTOMY         Family Hx:   Family History   Problem Relation Age of Onset    Diabetes Mother     Other Mother         MS    Cancer Mother         colon    Cancer Sister         throat    Alcohol abuse Paternal Grandmother        Social Hx:  Social History     Socioeconomic History    Marital status:      Spouse name: Not on file    Number of children: Not on file    Years of education: Not on file    Highest education level: Not on file   Occupational History    Not on file   Tobacco Use    Smoking status: Former     Current packs/day: 0.00     Average packs/day: 1 pack/day for 27.3 years (27.3 ttl pk-yrs)     Types: Cigarettes     Start date: 1989     Quit date: 2016     Years since quittin.3    Smokeless tobacco: Never   Vaping Use    Vaping status: Never Used   Substance and Sexual Activity    Alcohol use: Yes     Comment: rarely     Drug use: Yes     Types: Marijuana    Sexual activity: Yes     Partners: Male   Other Topics Concern    Not on file   Social History Narrative    Not on file     Social Determinants of Health     Financial Resource Strain: Not on file   Food Insecurity: Not on file   Transportation Needs: Not on file   Physical Activity: Not on file   Stress: Not on file   Social Connections: Not on file   Intimate Partner Violence: Not on file   Housing Stability: Not on  file       Allergies:  Allergies   Allergen Reactions    Amoxicillin Hives       Medications: reviewed on epic.   Outpatient Medications Marked as Taking for the 8/9/24 encounter (Office Visit) with Laura Lewis M.D.   Medication Sig Dispense Refill    traMADol (ULTRAM) 50 MG Tab Take 1 tablet every 4 hours by oral route as needed for 3 days.      meloxicam (MOBIC) 15 MG tablet Take 1 Tablet by mouth 1 time a day as needed for Inflammation, Severe Pain or Moderate Pain. Do not take other NSAIDs 30 Tablet 2    Semaglutide,0.25 or 0.5MG/DOS, 2 MG/1.5ML Solution Pen-injector Inject 0.50 mg every week on Sunday. 2 mL 2    triamcinolone acetonide (KENALOG) 0.1 % Cream Apply a pea-sized amount to the back of the scalp twice daily for 10 to 14 days 45 g 1    methylPREDNISolone (MEDROL DOSEPAK) 4 MG Tablet Therapy Pack As directed on the packaging label. 21 Tablet 0    DULoxetine (CYMBALTA) 30 MG Cap DR Particles Take 1 Capsule by mouth every day. 30 Capsule 5    fluticasone (FLONASE) 50 MCG/ACT nasal spray Administer 1 Spray into affected nostril(S) every day. 16 g 0    rosuvastatin (CRESTOR) 5 MG Tab Take 1 Tablet by mouth every evening. 30 Tablet 11    metFORMIN (GLUCOPHAGE) 500 MG Tab TAKE 1 TABLET BY MOUTH TWICE A DAY WITH FOOD 180 Tablet 3    Continuous Blood Gluc Sensor (FREESTYLE MAXWELL 14 DAY SENSOR) Misc Apply one sensor every 14 days 6 Each 3        Current Outpatient Medications on File Prior to Visit   Medication Sig Dispense Refill    traMADol (ULTRAM) 50 MG Tab Take 1 tablet every 4 hours by oral route as needed for 3 days.      meloxicam (MOBIC) 15 MG tablet Take 1 Tablet by mouth 1 time a day as needed for Inflammation, Severe Pain or Moderate Pain. Do not take other NSAIDs 30 Tablet 2    Semaglutide,0.25 or 0.5MG/DOS, 2 MG/1.5ML Solution Pen-injector Inject 0.50 mg every week on Sunday. 2 mL 2    triamcinolone acetonide (KENALOG) 0.1 % Cream Apply a pea-sized amount to the back of the scalp twice daily  "for 10 to 14 days 45 g 1    methylPREDNISolone (MEDROL DOSEPAK) 4 MG Tablet Therapy Pack As directed on the packaging label. 21 Tablet 0    DULoxetine (CYMBALTA) 30 MG Cap DR Particles Take 1 Capsule by mouth every day. 30 Capsule 5    fluticasone (FLONASE) 50 MCG/ACT nasal spray Administer 1 Spray into affected nostril(S) every day. 16 g 0    rosuvastatin (CRESTOR) 5 MG Tab Take 1 Tablet by mouth every evening. 30 Tablet 11    metFORMIN (GLUCOPHAGE) 500 MG Tab TAKE 1 TABLET BY MOUTH TWICE A DAY WITH FOOD 180 Tablet 3    Continuous Blood Gluc Sensor (FREESTYLE MAXWELL 14 DAY SENSOR) Misc Apply one sensor every 14 days 6 Each 3    albuterol 108 (90 Base) MCG/ACT Aero Soln inhalation aerosol INHALE 2 PUFFS EVERY FOUR HOURS AS NEEDED FOR SHORTNESS OF BREATH FOR UP TO 14 DAYS.       No current facility-administered medications on file prior to visit.         EXAMINATION     Physical Exam:   /82 (BP Location: Right arm, Patient Position: Sitting, BP Cuff Size: Adult)   Pulse 94   Temp 36.9 °C (98.4 °F) (Temporal)   Ht 1.702 m (5' 7\")   Wt 106 kg (233 lb 11 oz)   SpO2 91%     Constitutional:   Body Habitus: Body mass index is 36.6 kg/m².  Cooperation: Fully cooperates with exam  Appearance: Well-groomed, well-nourished.    Eyes: No scleral icterus to suggest severe liver disease, no proptosis to suggest severe hyperthyroidism    ENT -no obvious auditory deficits, no noticeable facial droop     Skin -no rashes or lesions noted     Respiratory-  breathing comfortably on room air, no audible wheezing    Cardiovascular-distal extremities warm and well perfused.  No lower extremity edema is noted.     Gastrointestinal - no obvious abdominal masses, non-distended    Psychiatric- alert and oriented ×3. Normal affect.     Gait stable, steady    Musculoskeletal    Slight tenderness to palpation at right iliopsoas bursa.  No significant tenderness to palpation at left iliopsoas bursa.    Lumbar spine /hip provocative exam " maneuvers  Straight leg raise negative bilaterally  FADIR test negative bilaterally    SI joint tests  SUSAN test negative bilaterally      Key points for the international standards for neurological classification of spinal cord injury (ISNCSCI) to light touch.   Dermatome R L   L2 2 2   L3 2 2   L4 2 2   L5 2 2   S1 2 2   S2 2 2       Motor Exam Lower Extremities  ? Myotome R L   Hip flexion L2 5 5   Knee extension L3 5 5   Ankle dorsiflexion L4 5 5   Toe extension L5 5 5   Ankle plantarflexion S1 5 5           MEDICAL DECISION MAKING    Medical records review: see under HPI section.     DATA    Labs: No new labs available for review since last visit.   Lab Results   Component Value Date/Time    SODIUM 138 06/12/2024 09:47 AM    POTASSIUM 4.8 06/12/2024 09:47 AM    CHLORIDE 102 06/12/2024 09:47 AM    CO2 24 06/12/2024 09:47 AM    ANION 12.0 06/12/2024 09:47 AM    GLUCOSE 171 (H) 06/12/2024 09:47 AM    BUN 11 06/12/2024 09:47 AM    CREATININE 0.65 06/12/2024 09:47 AM    CALCIUM 9.8 06/12/2024 09:47 AM    ASTSGOT 14 06/12/2024 09:47 AM    ALTSGPT 16 06/12/2024 09:47 AM    TBILIRUBIN 0.4 06/12/2024 09:47 AM    ALBUMIN 4.3 06/12/2024 09:47 AM    TOTPROTEIN 7.6 06/12/2024 09:47 AM    GLOBULIN 3.3 06/12/2024 09:47 AM    AGRATIO 1.3 06/12/2024 09:47 AM       Lab Results   Component Value Date/Time    PROTHROMBTM 13.0 08/14/2017 01:17 PM    INR 0.95 08/14/2017 01:17 PM        Lab Results   Component Value Date/Time    WBC 6.3 09/13/2022 10:11 AM    RBC 4.78 09/13/2022 10:11 AM    HEMOGLOBIN 13.7 09/13/2022 10:11 AM    HEMATOCRIT 43.5 09/13/2022 10:11 AM    MCV 91.0 09/13/2022 10:11 AM    MCH 28.7 09/13/2022 10:11 AM    MCHC 31.5 (L) 09/13/2022 10:11 AM    MPV 12.6 09/13/2022 10:11 AM    NEUTSPOLYS 55.60 09/13/2022 10:11 AM    LYMPHOCYTES 33.50 09/13/2022 10:11 AM    MONOCYTES 8.00 09/13/2022 10:11 AM    EOSINOPHILS 1.80 09/13/2022 10:11 AM    BASOPHILS 0.50 09/13/2022 10:11 AM        Lab Results   Component Value  Date/Time    HBA1C 7.6 (H) 2024 09:47 AM        Imaging:   I personally reviewed following images, these are my reads  X-ray pelvis 2024  Mild OA of bilateral hip joints.  See formal radiology report for further details.           IMAGING radiology reads. I reviewed the following radiology reads                  Results for orders placed in visit on 19    MR-CERVICAL SPINE-W/O    Addendum 2019 10:44 AM  Addendum:  At the level of C5-6 there is right paracentral disc protrusion causing effacement of the right lateral recess. There is indentation of the right side of the spinal cord contour at this level. There is mild right C6 neural foraminal stenosis.    Impression  1.  Degenerative disease in the cervical spine as described above.  2.  There is loss of cervical lordosis with kyphotic angulation at C6. This may be positional in nature.  3.  There is an approximately 1.3 x 1 cm sized nodule in the right lobe of the thyroid. Ultrasound examination is recommended for further evaluation.                                                    Results for orders placed during the hospital encounter of 22    DX-CHEST-2 VIEWS    Impression  No evidence of acute cardiopulmonary process.                                             Diagnosis  Visit Diagnoses     ICD-10-CM   1. Iliopsoas bursitis of left hip  M70.72   2. Iliopsoas bursitis of right hip  M70.71   3. Hip flexor tendinitis, unspecified laterality  M76.899   4. Primary osteoarthritis of both hips  M16.0         ASSESSMENT AND PLAN:  Caridad Nino (: 1980) is a female with send improvement in pain after bilateral iliopsoas bursa steroid injections      Caridad was seen today for follow-up.    Diagnoses and all orders for this visit:    Iliopsoas bursitis of left hip    Iliopsoas bursitis of right hip    Hip flexor tendinitis, unspecified laterality    Primary osteoarthritis of both hips          PLAN  Physical Therapy: I  previously ordered physical therapy to focus on strengthening and stretching as well as a home exercise program.  Advised the patient to pursue this to minimize the chance that her pain will recur     Diagnostic workup: no new imaging needed at this time    Medications:   -Okay to continue duloxetine as per PCP  -S/p oral steroids without significant relief     Interventions:   -Bilateral iliopsoas bursa steroid injections under ultrasound guidance as needed    Follow-up: As needed    Orders Placed This Encounter    albuterol 108 (90 Base) MCG/ACT Aero Soln inhalation aerosol    traMADol (ULTRAM) 50 MG Tab       Laura Lewis MD  Interventional Pain and Spine  Physical Medicine and Rehabilitation  [facility]      The above note documents my personal evaluation of this patient. In addition, I have reviewed and confirmed with the patient and MA the supportive information documented in today's Patient Health Questionnaire and Office Note.     Please note that this dictation was created using voice recognition software. I have made every reasonable attempt to correct obvious errors, but I expect that there are errors of grammar and possibly content that I did not discover before finalizing the note.

## 2024-08-09 ENCOUNTER — OFFICE VISIT (OUTPATIENT)
Dept: PHYSICAL MEDICINE AND REHAB | Facility: MEDICAL CENTER | Age: 44
End: 2024-08-09
Payer: COMMERCIAL

## 2024-08-09 ENCOUNTER — HOSPITAL ENCOUNTER (OUTPATIENT)
Dept: RADIOLOGY | Facility: MEDICAL CENTER | Age: 44
End: 2024-08-09
Attending: PHYSICIAN ASSISTANT
Payer: COMMERCIAL

## 2024-08-09 VITALS
OXYGEN SATURATION: 91 % | SYSTOLIC BLOOD PRESSURE: 118 MMHG | HEIGHT: 67 IN | HEART RATE: 94 BPM | WEIGHT: 233.69 LBS | TEMPERATURE: 98.4 F | BODY MASS INDEX: 36.68 KG/M2 | DIASTOLIC BLOOD PRESSURE: 82 MMHG

## 2024-08-09 DIAGNOSIS — M76.899 HIP FLEXOR TENDINITIS, UNSPECIFIED LATERALITY: ICD-10-CM

## 2024-08-09 DIAGNOSIS — M70.72 ILIOPSOAS BURSITIS OF LEFT HIP: ICD-10-CM

## 2024-08-09 DIAGNOSIS — E04.1 THYROID NODULE: ICD-10-CM

## 2024-08-09 DIAGNOSIS — E04.1 THYROID NODULE GREATER THAN OR EQUAL TO 1.5 CM IN DIAMETER INCIDENTALLY NOTED ON IMAGING STUDY: ICD-10-CM

## 2024-08-09 DIAGNOSIS — M16.0 PRIMARY OSTEOARTHRITIS OF BOTH HIPS: ICD-10-CM

## 2024-08-09 DIAGNOSIS — M70.71 ILIOPSOAS BURSITIS OF RIGHT HIP: ICD-10-CM

## 2024-08-09 LAB — CYTOLOGY REG CYTOL: NORMAL

## 2024-08-09 PROCEDURE — 88173 CYTOPATH EVAL FNA REPORT: CPT

## 2024-08-09 PROCEDURE — 3074F SYST BP LT 130 MM HG: CPT | Performed by: STUDENT IN AN ORGANIZED HEALTH CARE EDUCATION/TRAINING PROGRAM

## 2024-08-09 PROCEDURE — 99213 OFFICE O/P EST LOW 20 MIN: CPT | Performed by: STUDENT IN AN ORGANIZED HEALTH CARE EDUCATION/TRAINING PROGRAM

## 2024-08-09 PROCEDURE — 10005 FNA BX W/US GDN 1ST LES: CPT

## 2024-08-09 PROCEDURE — 3079F DIAST BP 80-89 MM HG: CPT | Performed by: STUDENT IN AN ORGANIZED HEALTH CARE EDUCATION/TRAINING PROGRAM

## 2024-08-09 PROCEDURE — 1126F AMNT PAIN NOTED NONE PRSNT: CPT | Performed by: STUDENT IN AN ORGANIZED HEALTH CARE EDUCATION/TRAINING PROGRAM

## 2024-08-09 RX ORDER — ALBUTEROL SULFATE 90 UG/1
AEROSOL, METERED RESPIRATORY (INHALATION)
COMMUNITY

## 2024-08-09 RX ORDER — TRAMADOL HYDROCHLORIDE 50 MG/1
TABLET ORAL
COMMUNITY

## 2024-08-09 ASSESSMENT — PATIENT HEALTH QUESTIONNAIRE - PHQ9
CLINICAL INTERPRETATION OF PHQ2 SCORE: 2
SUM OF ALL RESPONSES TO PHQ QUESTIONS 1-9: 6
5. POOR APPETITE OR OVEREATING: 1 - SEVERAL DAYS

## 2024-08-09 ASSESSMENT — FIBROSIS 4 INDEX: FIB4 SCORE: 1.04

## 2024-08-09 ASSESSMENT — PAIN SCALES - GENERAL: PAINLEVEL: NO PAIN

## 2024-08-09 NOTE — PROGRESS NOTES
US guided Right thyroid nodule fine needle aspiration done by Velma Campos; NON-SEDATION (no H&P required as this is a NON SEDATION procedure) Right anterior aspect of neck access site, dressing CDI; 3 samples in 1 jar of cytolyt obtained, 2 samples in 1 vial afirma obtained and sent to lab. Pt tolerated the procedure well. Pt hemodynamically stable pre/intra/post procedure; all questions and concerns answered prior to being d/c; patient provided with appropriate education for procedure; pt d/c home.

## 2024-08-15 DIAGNOSIS — E11.65 TYPE 2 DIABETES MELLITUS WITH HYPERGLYCEMIA, WITHOUT LONG-TERM CURRENT USE OF INSULIN (HCC): ICD-10-CM

## 2024-08-15 NOTE — TELEPHONE ENCOUNTER
Received request via: Patient    Was the patient seen in the last year in this department? Yes    Does the patient have an active prescription (recently filled or refills available) for medication(s) requested? No    Pharmacy Name: [id number] - [place], [place] - [address]     Does the patient have snf Plus and need 100-day supply? (This applies to ALL medications) Patient does not have SCP

## 2024-08-19 ENCOUNTER — OFFICE VISIT (OUTPATIENT)
Dept: URGENT CARE | Facility: CLINIC | Age: 44
End: 2024-08-19
Payer: COMMERCIAL

## 2024-08-19 VITALS
DIASTOLIC BLOOD PRESSURE: 64 MMHG | RESPIRATION RATE: 16 BRPM | BODY MASS INDEX: 36.73 KG/M2 | WEIGHT: 234 LBS | HEART RATE: 99 BPM | HEIGHT: 67 IN | TEMPERATURE: 97.4 F | OXYGEN SATURATION: 94 % | SYSTOLIC BLOOD PRESSURE: 120 MMHG

## 2024-08-19 DIAGNOSIS — H10.32 ACUTE BACTERIAL CONJUNCTIVITIS OF LEFT EYE: ICD-10-CM

## 2024-08-19 PROCEDURE — 3074F SYST BP LT 130 MM HG: CPT | Performed by: NURSE PRACTITIONER

## 2024-08-19 PROCEDURE — 99213 OFFICE O/P EST LOW 20 MIN: CPT | Performed by: NURSE PRACTITIONER

## 2024-08-19 PROCEDURE — 3078F DIAST BP <80 MM HG: CPT | Performed by: NURSE PRACTITIONER

## 2024-08-19 RX ORDER — POLYMYXIN B SULFATE AND TRIMETHOPRIM 1; 10000 MG/ML; [USP'U]/ML
1 SOLUTION OPHTHALMIC 4 TIMES DAILY
Qty: 10 ML | Refills: 0 | Status: SHIPPED | OUTPATIENT
Start: 2024-08-19 | End: 2024-08-29

## 2024-08-19 ASSESSMENT — FIBROSIS 4 INDEX: FIB4 SCORE: 1.04

## 2024-08-19 ASSESSMENT — VISUAL ACUITY: OU: 1

## 2024-08-19 NOTE — PROGRESS NOTES
Date: 08/19/24        Chief Complaint   Patient presents with    Eye Problem     L eye redness, drainage, itchiness, started this morning         History of Present Illness: 44 y.o.  female presents to clinic with waking up this morning with drainage from her left eye, itchiness and irritation.  She denies any trauma to her eye possibility of foreign body she does not wear contacts.  No recent cold-like symptoms no fever body aches.  She denies any vision changes.      ROS:    As otherwise stated in HPI    Medical/SX/ Social History:  Reviewed per chart    Pertinent Medications:    Current Outpatient Medications on File Prior to Visit   Medication Sig Dispense Refill    metFORMIN (GLUCOPHAGE) 500 MG Tab Take 1 Tablet by mouth 2 times a day with meals. 180 Tablet 3    albuterol 108 (90 Base) MCG/ACT Aero Soln inhalation aerosol INHALE 2 PUFFS EVERY FOUR HOURS AS NEEDED FOR SHORTNESS OF BREATH FOR UP TO 14 DAYS.      traMADol (ULTRAM) 50 MG Tab Take 1 tablet every 4 hours by oral route as needed for 3 days.      meloxicam (MOBIC) 15 MG tablet Take 1 Tablet by mouth 1 time a day as needed for Inflammation, Severe Pain or Moderate Pain. Do not take other NSAIDs 30 Tablet 2    Semaglutide,0.25 or 0.5MG/DOS, 2 MG/1.5ML Solution Pen-injector Inject 0.50 mg every week on Sunday. 2 mL 2    triamcinolone acetonide (KENALOG) 0.1 % Cream Apply a pea-sized amount to the back of the scalp twice daily for 10 to 14 days 45 g 1    methylPREDNISolone (MEDROL DOSEPAK) 4 MG Tablet Therapy Pack As directed on the packaging label. 21 Tablet 0    DULoxetine (CYMBALTA) 30 MG Cap DR Particles Take 1 Capsule by mouth every day. 30 Capsule 5    fluticasone (FLONASE) 50 MCG/ACT nasal spray Administer 1 Spray into affected nostril(S) every day. 16 g 0    rosuvastatin (CRESTOR) 5 MG Tab Take 1 Tablet by mouth every evening. 30 Tablet 11    Continuous Blood Gluc Sensor (FREESTYLE MAXWELL 14 DAY SENSOR) Misc Apply one sensor every 14 days 6 Each 3      No current facility-administered medications on file prior to visit.        Allergies:    Amoxicillin     Problem list, medications, and allergies reviewed by myself today in Epic     Physical Exam:    Vitals:    08/19/24 1316   BP: 120/64   Pulse: 99   Resp: 16   Temp: 36.3 °C (97.4 °F)   SpO2: 94%             Physical Exam  Constitutional:       Appearance: Normal appearance. She is not ill-appearing or diaphoretic.   HENT:      Head: Normocephalic and atraumatic.      Nose: Nose normal.      Mouth/Throat:      Lips: Pink.      Mouth: Mucous membranes are moist.   Eyes:      General: Lids are everted, no foreign bodies appreciated. Vision grossly intact. No allergic shiner, visual field deficit or scleral icterus.        Left eye: No foreign body or hordeolum.      Extraocular Movements: Extraocular movements intact.      Conjunctiva/sclera:      Left eye: Left conjunctiva is injected. Chemosis and exudate present. No hemorrhage.     Pupils: Pupils are equal, round, and reactive to light.   Neurological:      Mental Status: She is alert.            Medical Decision making and plan :  I personally reviewed prior external notes and test results pertinent to today's visit. Pt is clinically stable at today's acute urgent care visit.  Patient appears nontoxic with no acute distress noted. Appropriate for outpatient care at this time.      Pleasant 44 y.o. female presented clinic with HPI exam findings consistent with acute bacterial conjunctivitis of the left eye.  No foreign body noted on exam.  Advised cool compresses to help with itching hydrating eyedrops.  She may use allergic eyedrops as well although advised not to use antiright eye.  We will treat with Polytrim.  Advised if there is no improvement in the next 48 hours return to clinic.    1. Acute bacterial conjunctivitis of left eye    - polymixin-trimethoprim (POLYTRIM) 16430-8.1 UNIT/ML-% Solution; Administer 1 Drop into the left eye 4 times a day for 10  days.  Dispense: 10 mL; Refill: 0       Shared decision-making was utilized with patient for treatment plan. Medication discussed included indication for use and the potential benefits and side effects. Education was provided regarding the aforementioned assessments.  Differential Diagnosis, natural history, and supportive care discussed. All of the patient's questions were answered to their satisfaction at the time of discharge. Patient was encouraged to monitor symptoms closely. Those signs and symptoms which would warrant concern and mandate seeking a higher level of service through the emergency department discussed at length.  Patient stated agreement and understanding of this plan of care.    Disposition:  Home in stable condition       Voice Recognition Disclaimer:  Portions of this document were created using voice recognition software. The software does have a chance of producing errors of grammar and possibly content. I have made every reasonable attempt to correct obvious errors, but there may be errors of grammar and possibly content that I did not discover before finalizing the documentation.    FARRUKH Clarke.

## 2024-08-19 NOTE — LETTER
[facility]  [facility]  [address]  [place] [address]     August 19, 2024    Patient: Caridad Nino   YOB: 1980   Date of Visit: 8/19/2024       To Whom It May Concern:    Caridad Nino was seen and treated in our department on 8/19/2024. Please excuse from work tonight 8/19/24.     Sincerely,     FARRUKH Clarke.

## 2024-10-03 ENCOUNTER — APPOINTMENT (OUTPATIENT)
Dept: RADIOLOGY | Facility: IMAGING CENTER | Age: 44
End: 2024-10-03
Attending: PHYSICIAN ASSISTANT
Payer: COMMERCIAL

## 2024-10-03 ENCOUNTER — OCCUPATIONAL MEDICINE (OUTPATIENT)
Dept: URGENT CARE | Facility: CLINIC | Age: 44
End: 2024-10-03
Payer: COMMERCIAL

## 2024-10-03 ENCOUNTER — APPOINTMENT (OUTPATIENT)
Dept: MEDICAL GROUP | Facility: MEDICAL CENTER | Age: 44
End: 2024-10-03
Payer: COMMERCIAL

## 2024-10-03 VITALS
RESPIRATION RATE: 16 BRPM | TEMPERATURE: 98.1 F | HEART RATE: 77 BPM | OXYGEN SATURATION: 97 % | SYSTOLIC BLOOD PRESSURE: 112 MMHG | DIASTOLIC BLOOD PRESSURE: 78 MMHG | HEIGHT: 67 IN | BODY MASS INDEX: 35.79 KG/M2 | WEIGHT: 228 LBS

## 2024-10-03 VITALS
SYSTOLIC BLOOD PRESSURE: 98 MMHG | DIASTOLIC BLOOD PRESSURE: 62 MMHG | HEIGHT: 67 IN | OXYGEN SATURATION: 95 % | TEMPERATURE: 97.4 F | WEIGHT: 228 LBS | BODY MASS INDEX: 35.79 KG/M2 | HEART RATE: 93 BPM

## 2024-10-03 DIAGNOSIS — M79.641 RIGHT HAND PAIN: ICD-10-CM

## 2024-10-03 DIAGNOSIS — S60.229A CONTUSION OF HAND, UNSPECIFIED LATERALITY, INITIAL ENCOUNTER: ICD-10-CM

## 2024-10-03 DIAGNOSIS — E11.65 TYPE 2 DIABETES MELLITUS WITH HYPERGLYCEMIA, WITHOUT LONG-TERM CURRENT USE OF INSULIN (HCC): ICD-10-CM

## 2024-10-03 DIAGNOSIS — F41.1 GAD (GENERALIZED ANXIETY DISORDER): ICD-10-CM

## 2024-10-03 PROCEDURE — 3074F SYST BP LT 130 MM HG: CPT | Performed by: PHYSICIAN ASSISTANT

## 2024-10-03 PROCEDURE — 3078F DIAST BP <80 MM HG: CPT | Performed by: PHYSICIAN ASSISTANT

## 2024-10-03 PROCEDURE — 73130 X-RAY EXAM OF HAND: CPT | Mod: TC,FY,RT | Performed by: PHYSICIAN ASSISTANT

## 2024-10-03 PROCEDURE — 99214 OFFICE O/P EST MOD 30 MIN: CPT | Performed by: PHYSICIAN ASSISTANT

## 2024-10-03 RX ORDER — PREDNISONE 20 MG/1
TABLET ORAL
COMMUNITY
End: 2024-10-03

## 2024-10-03 RX ORDER — HYDROXYZINE HYDROCHLORIDE 25 MG/1
TABLET, FILM COATED ORAL
COMMUNITY
End: 2024-10-03

## 2024-10-03 RX ORDER — BENZONATATE 100 MG/1
1 CAPSULE ORAL 3 TIMES DAILY PRN
COMMUNITY
End: 2024-10-03

## 2024-10-03 RX ORDER — SULFAMETHOXAZOLE AND TRIMETHOPRIM 800; 160 MG/1; MG/1
TABLET ORAL
COMMUNITY
End: 2024-10-03

## 2024-10-03 RX ORDER — SEMAGLUTIDE 1.34 MG/ML
INJECTION, SOLUTION SUBCUTANEOUS
Qty: 2 ML | Refills: 1 | Status: SHIPPED | OUTPATIENT
Start: 2024-10-03

## 2024-10-03 RX ORDER — DULOXETIN HYDROCHLORIDE 60 MG/1
60 CAPSULE, DELAYED RELEASE ORAL DAILY
Qty: 90 CAPSULE | Refills: 3 | Status: SHIPPED | OUTPATIENT
Start: 2024-10-03

## 2024-10-03 RX ORDER — CYCLOBENZAPRINE HCL 10 MG
TABLET ORAL
COMMUNITY
End: 2024-10-03

## 2024-10-03 RX ORDER — CEFDINIR 300 MG/1
CAPSULE ORAL
COMMUNITY
End: 2024-10-03

## 2024-10-03 RX ORDER — NAPROXEN 500 MG/1
TABLET ORAL
COMMUNITY
End: 2024-10-03

## 2024-10-03 RX ORDER — CYCLOBENZAPRINE HCL 5 MG
TABLET ORAL
COMMUNITY
End: 2024-10-03

## 2024-10-08 ENCOUNTER — OCCUPATIONAL MEDICINE (OUTPATIENT)
Dept: URGENT CARE | Facility: CLINIC | Age: 44
End: 2024-10-08
Payer: COMMERCIAL

## 2024-10-08 VITALS
WEIGHT: 227 LBS | HEART RATE: 77 BPM | OXYGEN SATURATION: 95 % | DIASTOLIC BLOOD PRESSURE: 72 MMHG | HEIGHT: 67 IN | SYSTOLIC BLOOD PRESSURE: 108 MMHG | TEMPERATURE: 97.6 F | BODY MASS INDEX: 35.63 KG/M2

## 2024-10-08 DIAGNOSIS — M79.641 RIGHT HAND PAIN: ICD-10-CM

## 2024-10-08 DIAGNOSIS — S60.229D CONTUSION OF HAND, UNSPECIFIED LATERALITY, SUBSEQUENT ENCOUNTER: ICD-10-CM

## 2024-10-08 PROCEDURE — 99213 OFFICE O/P EST LOW 20 MIN: CPT | Performed by: NURSE PRACTITIONER

## 2024-10-08 PROCEDURE — 3074F SYST BP LT 130 MM HG: CPT | Performed by: NURSE PRACTITIONER

## 2024-10-08 PROCEDURE — 3078F DIAST BP <80 MM HG: CPT | Performed by: NURSE PRACTITIONER

## 2024-10-11 ENCOUNTER — TELEPHONE (OUTPATIENT)
Dept: MEDICAL GROUP | Facility: MEDICAL CENTER | Age: 44
End: 2024-10-11

## 2024-10-11 NOTE — TELEPHONE ENCOUNTER
DOCUMENTATION OF PAR STATUS:    1. Name of Medication & Dose: OZEMPIC      2. Name of Prescription Coverage Company & phone #: [employer]     3. Date Prior Auth Submitted: 10/11/2024    4. What information was given to obtain insurance decision? LABS     5. Prior Auth Status? Pending    6. Patient Notified: no

## 2024-10-15 ENCOUNTER — OCCUPATIONAL MEDICINE (OUTPATIENT)
Dept: URGENT CARE | Facility: CLINIC | Age: 44
End: 2024-10-15
Payer: COMMERCIAL

## 2024-10-15 VITALS
BODY MASS INDEX: 35.63 KG/M2 | TEMPERATURE: 98.2 F | SYSTOLIC BLOOD PRESSURE: 126 MMHG | WEIGHT: 227 LBS | OXYGEN SATURATION: 95 % | RESPIRATION RATE: 20 BRPM | HEART RATE: 80 BPM | HEIGHT: 67 IN | DIASTOLIC BLOOD PRESSURE: 80 MMHG

## 2024-10-15 DIAGNOSIS — S60.229D CONTUSION OF HAND, UNSPECIFIED LATERALITY, SUBSEQUENT ENCOUNTER: ICD-10-CM

## 2024-10-15 PROCEDURE — 3079F DIAST BP 80-89 MM HG: CPT | Performed by: PHYSICIAN ASSISTANT

## 2024-10-15 PROCEDURE — 99213 OFFICE O/P EST LOW 20 MIN: CPT | Performed by: PHYSICIAN ASSISTANT

## 2024-10-15 PROCEDURE — 3074F SYST BP LT 130 MM HG: CPT | Performed by: PHYSICIAN ASSISTANT

## 2024-10-15 ASSESSMENT — ENCOUNTER SYMPTOMS
FEVER: 0
CHILLS: 0
WEAKNESS: 0
SENSORY CHANGE: 0
TINGLING: 0
FOCAL WEAKNESS: 0

## 2024-11-01 DIAGNOSIS — E78.2 MIXED HYPERLIPIDEMIA: ICD-10-CM

## 2024-11-04 RX ORDER — ROSUVASTATIN CALCIUM 5 MG/1
5 TABLET, COATED ORAL EVERY EVENING
Qty: 90 TABLET | Refills: 3 | Status: SHIPPED | OUTPATIENT
Start: 2024-11-04

## 2024-11-04 NOTE — TELEPHONE ENCOUNTER
Received request via: Pharmacy    Was the patient seen in the last year in this department? Yes    Does the patient have an active prescription (recently filled or refills available) for medication(s) requested? No    Pharmacy Name:     Does the patient have correction Plus and need 100-day supply? (This applies to ALL medications) Patient does not have SCP

## 2024-11-24 DIAGNOSIS — E11.65 TYPE 2 DIABETES MELLITUS WITH HYPERGLYCEMIA, WITHOUT LONG-TERM CURRENT USE OF INSULIN (HCC): ICD-10-CM

## 2024-11-25 RX ORDER — SEMAGLUTIDE 1.34 MG/ML
INJECTION, SOLUTION SUBCUTANEOUS
Qty: 3 ML | Refills: 0 | Status: SHIPPED | OUTPATIENT
Start: 2024-11-25

## 2024-11-25 NOTE — TELEPHONE ENCOUNTER
Received request via: Pharmacy    Was the patient seen in the last year in this department? Yes    Does the patient have an active prescription (recently filled or refills available) for medication(s) requested? No    [facility] Name[facility]    Does the patient have alf Plus and need 100-day supply? (This applies to ALL medications) Patient does not have SCP

## 2024-12-20 DIAGNOSIS — E11.65 TYPE 2 DIABETES MELLITUS WITH HYPERGLYCEMIA, WITHOUT LONG-TERM CURRENT USE OF INSULIN (HCC): ICD-10-CM

## 2024-12-20 NOTE — TELEPHONE ENCOUNTER
Received request via: Pharmacy    Was the patient seen in the last year in this department? Yes    Does the patient have an active prescription (recently filled or refills available) for medication(s) requested? No    Pharmacy Name: [facility] - [place], [place] - [address]     Does the patient have shelter Plus and need 100-day supply? (This applies to ALL medications) Patient does not have SCP

## 2024-12-23 RX ORDER — SEMAGLUTIDE 1.34 MG/ML
INJECTION, SOLUTION SUBCUTANEOUS
Qty: 3 ML | Refills: 0 | Status: SHIPPED | OUTPATIENT
Start: 2024-12-23

## 2025-01-14 ENCOUNTER — OFFICE VISIT (OUTPATIENT)
Dept: MEDICAL GROUP | Facility: MEDICAL CENTER | Age: 45
End: 2025-01-14
Payer: COMMERCIAL

## 2025-01-14 VITALS
OXYGEN SATURATION: 93 % | SYSTOLIC BLOOD PRESSURE: 118 MMHG | WEIGHT: 210 LBS | DIASTOLIC BLOOD PRESSURE: 80 MMHG | HEIGHT: 67 IN | BODY MASS INDEX: 32.96 KG/M2 | HEART RATE: 86 BPM | TEMPERATURE: 97.8 F

## 2025-01-14 DIAGNOSIS — E11.65 TYPE 2 DIABETES MELLITUS WITH HYPERGLYCEMIA, WITHOUT LONG-TERM CURRENT USE OF INSULIN (HCC): ICD-10-CM

## 2025-01-14 DIAGNOSIS — N95.1 PERIMENOPAUSAL: ICD-10-CM

## 2025-01-14 DIAGNOSIS — N92.1 MENORRHAGIA WITH IRREGULAR CYCLE: ICD-10-CM

## 2025-01-14 DIAGNOSIS — E66.9 OBESITY (BMI 30-39.9): ICD-10-CM

## 2025-01-14 DIAGNOSIS — F41.1 GAD (GENERALIZED ANXIETY DISORDER): ICD-10-CM

## 2025-01-14 DIAGNOSIS — E04.1 THYROID NODULE GREATER THAN OR EQUAL TO 1.5 CM IN DIAMETER INCIDENTALLY NOTED ON IMAGING STUDY: ICD-10-CM

## 2025-01-14 PROCEDURE — 3074F SYST BP LT 130 MM HG: CPT | Performed by: PHYSICIAN ASSISTANT

## 2025-01-14 PROCEDURE — 3079F DIAST BP 80-89 MM HG: CPT | Performed by: PHYSICIAN ASSISTANT

## 2025-01-14 PROCEDURE — 99214 OFFICE O/P EST MOD 30 MIN: CPT | Performed by: PHYSICIAN ASSISTANT

## 2025-01-14 RX ORDER — SEMAGLUTIDE 1.34 MG/ML
INJECTION, SOLUTION SUBCUTANEOUS
Qty: 3 ML | Refills: 2 | Status: SHIPPED | OUTPATIENT
Start: 2025-01-14

## 2025-01-14 RX ORDER — SERTRALINE HYDROCHLORIDE 25 MG/1
25 TABLET, FILM COATED ORAL DAILY
Qty: 30 TABLET | Refills: 5 | Status: SHIPPED | OUTPATIENT
Start: 2025-01-14

## 2025-01-14 NOTE — PROGRESS NOTES
Subjective:     History of Present Illness  The patient is a 44-year-old female who presents for evaluation of anxiety, diabetes mellitus, and perimenopausal symptoms.    She has been experiencing daily episodes of vomiting, which she attributes to her duloxetine medication. She conducted a personal experiment to confirm this correlation. Upon administration of the medication, she would awaken with abdominal discomfort and nausea, often leading to vomiting. Consequently, she discontinued the medication approximately 45 days ago. She reports a mixed response to this cessation, noting both improvements and deteriorations in her condition. She acknowledges that the medication facilitated significant weight loss, but also suspects it may have contributed to the development of brown and black patches around her eyes, nose, and cheeks. She expresses interest in exploring alternative medications for her anxiety, as duloxetine was her initial treatment. She also discloses occasional use of marijuana.    She continues to receive semaglutide injections, which have not induced any vomiting. Despite an increase in dosage, she reports only mild nausea. She does not have any recent laboratory results. She has sufficient metformin supply but requires a refill of her Ozempic prescription.    She was scheduled for a hysterectomy last year but did not proceed with the procedure. She experienced approximately six menstrual cycles in the past year, each characterized by severe symptoms including the passage of large blood clots. She describes the clots as varying in size and consistency, some being stringy, others small, and some large. She expresses fear regarding these symptoms. She uses size 4 sanitary pads during the day and size 3 at night due to heavy bleeding. She has never undergone an ultrasound examination.    SOCIAL HISTORY  The patient admits to smoking marijuana.    FAMILY HISTORY  Her mother had fibroid  tumors.    MEDICATIONS  Discontinued: duloxetine  Current: semaglutide, metformin      Current medicines (including changes today)  Current Outpatient Medications   Medication Sig Dispense Refill    Semaglutide, 1 MG/DOSE, (OZEMPIC, 1 MG/DOSE,) 4 MG/3ML Solution Pen-injector INJECT 1 MG UNDER THE SKIN EVERY SEVEN DAYS 3 mL 0    rosuvastatin (CRESTOR) 5 MG Tab TAKE ONE TABLET BY MOUTH IN THE EVENING 90 Tablet 3    metFORMIN (GLUCOPHAGE) 500 MG Tab Take 1 Tablet by mouth 2 times a day with meals. 180 Tablet 3    albuterol 108 (90 Base) MCG/ACT Aero Soln inhalation aerosol INHALE 2 PUFFS EVERY FOUR HOURS AS NEEDED FOR SHORTNESS OF BREATH FOR UP TO 14 DAYS.      triamcinolone acetonide (KENALOG) 0.1 % Cream Apply a pea-sized amount to the back of the scalp twice daily for 10 to 14 days 45 g 1    DULoxetine (CYMBALTA) 60 MG Cap DR Particles delayed-release capsule Take 1 Capsule by mouth every day. 90 Capsule 3    meloxicam (MOBIC) 15 MG tablet Take 1 Tablet by mouth 1 time a day as needed for Inflammation, Severe Pain or Moderate Pain. Do not take other NSAIDs (Patient not taking: Reported on 1/14/2025) 30 Tablet 2    fluticasone (FLONASE) 50 MCG/ACT nasal spray Administer 1 Spray into affected nostril(S) every day. (Patient not taking: Reported on 1/14/2025) 16 g 0    Continuous Blood Gluc Sensor (FREESTYLE MAXWELL 14 DAY SENSOR) Misc Apply one sensor every 14 days (Patient not taking: Reported on 10/8/2024) 6 Each 3     No current facility-administered medications for this visit.     She  has a past medical history of Cervical radiculopathy (1/18/2022), Dental disorder, Hemorrhoids (1998), History of shingles (June/July 2016), Hypertension (1997), Hypertension due to endocrine disorder (8/22/2019), Mixed hyperlipidemia (10/10/2022), Morbid obesity due to excess calories (HCC) (12/3/2016), Normocytic anemia (8/15/2017), Pneumonia, Snoring, Thrombocytopenia (HCC) (8/14/2017), Thyroid nodule, Type 2 diabetes mellitus  "without complication, without long-term current use of insulin (HCC), and Vertigo (8/14/2017).    She has no past medical history of Addisons disease ([facility]), Allergy, Anesthesia, Anginal syndrome ([facility]), Anxiety, Arrhythmia, Arthritis, Blood clotting disorder ([facility]), Blood transfusion without reported diagnosis, Bowel habit changes, Breath shortness, Carcinoma in situ of respiratory system, Cataract, Clotting disorder ([facility]), Cold, Continuous ambulatory peritoneal dialysis status ([facility]), Coughing blood, Cushings syndrome ([facility]), Depression, Diabetic neuropathy ([facility]), Dialysis patient ([facility]), Encounter for long-term (current) use of other medications, GERD (gastroesophageal reflux disease), Glaucoma, Gynecological disorder, Heart attack ([facility]), Heart murmur, Heart valve disease, Hemorrhagic disorder ([facility]), Hepatitis A, Hepatitis B, Hepatitis C, Hiatus hernia syndrome, High cholesterol, HIV (human immunodeficiency virus infection) ([facility]), IBD (inflammatory bowel disease), Indigestion, Jaundice, Meningitis, Migraine, Osteoporosis, Pacemaker, Parathyroid disorder ([facility]), Pituitary disease (HCC), Pulmonary emphysema ([facility]), Rheumatic fever, Seizure (HCC), Sickle cell disease (HCC), Sleep apnea, Substance abuse ([facility]), Tuberculosis, Urinary bladder disorder, or Urinary incontinence.    ROS   No chest pain, no shortness of breath, no abdominal pain  Positive ROS as per HPI.  All other systems reviewed and are negative.     Objective:     /80   Pulse 86   Temp 36.6 °C (97.8 °F) (Temporal)   Ht 1.702 m (5' 7\")   Wt 95.3 kg (210 lb)   SpO2 93%  Body mass index is 32.89 kg/m².   Physical Exam    Constitutional: Alert, no distress.  Skin: Warm, dry, good turgor, no rashes in visible areas.  Eye: Equal, round and reactive, conjunctiva clear, lids normal.  ENMT: Lips without lesions, good dentition, oropharynx clear.  Neck: Trachea midline, no masses, no thyromegaly. No cervical or supraclavicular lymphadenopathy  Respiratory: " Unlabored respiratory effort, lungs clear to auscultation, no wheezes, no ronchi.  Cardiovascular: Normal S1, S2, no murmur, no edema.  Abdomen: Soft, non-tender, no masses, no hepatosplenomegaly.  Psych: Alert and oriented x3, normal affect and mood.      Results          Assessment and Plan:   The following treatment plan was discussed    Assessment & Plan  1. Anxiety.  She has discontinued duloxetine due to adverse effects, including nausea and vomiting. Sertraline will be initiated at a low dose for anxiety management. If no improvement is observed after one month, the dosage can be increased by 25 mg every month, up to a maximum of 100 mg. She is advised to report any adverse effects immediately.    2. Diabetes Mellitus.  Her last hemoglobin A1c test was conducted over six months ago. Her metabolic panel from June 2024 showed elevated blood glucose levels, but normal liver and kidney function. She will continue her current regimen of semaglutide injections. Laboratory tests will be ordered to monitor her diabetes. If the results are satisfactory, the semaglutide dosage will be increased. If not, the current dosage will be maintained. She is advised to ensure regular lab tests every six months to continue her medication regimen.    3. Perimenopausal Symptoms.  She reports experiencing heavy menstrual bleeding with large clots and irregular periods. An FSH test will be conducted to assess her menopausal status. A referral to gynecology will be made for further evaluation. A transvaginal ultrasound will be ordered to check for fibroids. A CBC will be ordered to assess for potential anemia.    Follow-up  The patient will follow up in 3 months.      () Today's E/M visit is associated with medical care services that serve as the continuing focal point for all needed health care services and/or with medical care services that are part of ongoing care related to a patient's single, serious condition, or a complex  condition: This includes furnishing services to patients on an ongoing basis that result in care that is personalized to the patient. The services result in a comprehensive, longitudinal, and continuous relationship with the patient and involve delivery of team-based care that is accessible, coordinated with other practitioners and providers, and integrated with the broader health care landscape.     1. Type 2 Diabetes Mellitus.  The current metformin regimen is 500 mg twice daily. The Ozempic dosage will be increased from 0.5 mg to 1 mg subcutaneously every 10 days, with weekly repetition. Her cholesterol panel from June 2024 was satisfactory. Hemoglobin A1c will be checked. She is advised to maintain her weight loss efforts and continue walking. A urinalysis will be ordered to check for any issues related to urinary symptoms. Current weight is 228     2. Generalized Anxiety Disorder.  Duloxetine dose was stopped by patient because she was vomiting and feels this is I side effect     3. Urinary Symptoms.  The symptoms may be due to concentrated urine, possibly caused by dehydration from medications. She is advised to increase her water intake. A urinalysis will be ordered, along with liver and kidney function tests.        4.  Thyroid nodule: Patient has a known history of thyroid nodule.  She had an ultrasound earlier this year and I ordered a thyroid FNA and reviewed it.  It showed benign cells that did not show any atypia.  Low risk for progression to cancer showing 3% risk of malignancy.. She did see ENT.       Caridad was seen today for medication refill.    Diagnoses and all orders for this visit:    Type 2 diabetes mellitus with hyperglycemia, without long-term current use of insulin (HCC)  -     Comp Metabolic Panel; Future  -     Hemoglobin A1c; Future  -     Lipid Profile; Future  -     Microalbumin Creat Ratio Urine - Lab Collect; Future  -     Semaglutide, 1 MG/DOSE, (OZEMPIC, 1 MG/DOSE,) 4 MG/3ML  Solution Pen-injector; INJECT 1 MG UNDER THE SKIN EVERY SEVEN DAYS and repeat weekly    ALESSANDRO (generalized anxiety disorder)  -     sertraline (ZOLOFT) 25 MG tablet; Take 1 Tablet by mouth every day.    Menorrhagia with irregular cycle  -     FSH/LH; Future  -     ESTRADIOL; Future  -     PROGESTERONE; Future  -     Referral to Gynecology  -     US-PELVIC COMPLETE (TRANSABDOMINAL/TRANSVAGINAL) (COMBO); Future  -     CBC WITH DIFFERENTIAL; Future    Perimenopausal  -     FSH/LH; Future  -     ESTRADIOL; Future  -     PROGESTERONE; Future  -     Referral to Gynecology  -     US-PELVIC COMPLETE (TRANSABDOMINAL/TRANSVAGINAL) (COMBO); Future    Thyroid nodule greater than or equal to 1.5 cm in diameter incidentally noted on imaging study    Obesity (BMI 30-39.9)          Anticipatory guidance discussed at length including regular daily exercise with a goal of 150 minutes a week.  Recommendation to eat the Mediterranean diet to decrease cardiovascular risk.  Encouraged avoiding high fructose corn syrup and other simple sugars to reduce risk of obesity and type 2 diabetes.    Follow Up: 12 weeks    Please note that this dictation was created using voice recognition software. I have made every reasonable attempt to correct obvious errors, but I expect that there are errors of grammar and possibly content that I did not discover before finalizing the note.      Attestation      Verbal consent was acquired by the patient to use [employer] ambient listening note generation during this visit Yes

## 2025-01-15 ENCOUNTER — OFFICE VISIT (OUTPATIENT)
Dept: PHYSICAL MEDICINE AND REHAB | Facility: MEDICAL CENTER | Age: 45
End: 2025-01-15
Payer: COMMERCIAL

## 2025-01-15 VITALS
BODY MASS INDEX: 33.11 KG/M2 | OXYGEN SATURATION: 95 % | WEIGHT: 210.98 LBS | HEIGHT: 67 IN | TEMPERATURE: 98 F | HEART RATE: 78 BPM | SYSTOLIC BLOOD PRESSURE: 152 MMHG | DIASTOLIC BLOOD PRESSURE: 93 MMHG

## 2025-01-15 DIAGNOSIS — G89.29 CHRONIC BILATERAL THORACIC BACK PAIN: ICD-10-CM

## 2025-01-15 DIAGNOSIS — M54.6 CHRONIC BILATERAL THORACIC BACK PAIN: ICD-10-CM

## 2025-01-15 DIAGNOSIS — M79.10 MYALGIA: ICD-10-CM

## 2025-01-15 DIAGNOSIS — M70.72 ILIOPSOAS BURSITIS OF LEFT HIP: ICD-10-CM

## 2025-01-15 DIAGNOSIS — M76.899 HIP FLEXOR TENDINITIS, UNSPECIFIED LATERALITY: ICD-10-CM

## 2025-01-15 DIAGNOSIS — M70.71 ILIOPSOAS BURSITIS OF RIGHT HIP: ICD-10-CM

## 2025-01-15 DIAGNOSIS — M16.0 PRIMARY OSTEOARTHRITIS OF BOTH HIPS: ICD-10-CM

## 2025-01-15 PROCEDURE — 3077F SYST BP >= 140 MM HG: CPT | Performed by: STUDENT IN AN ORGANIZED HEALTH CARE EDUCATION/TRAINING PROGRAM

## 2025-01-15 PROCEDURE — 3080F DIAST BP >= 90 MM HG: CPT | Performed by: STUDENT IN AN ORGANIZED HEALTH CARE EDUCATION/TRAINING PROGRAM

## 2025-01-15 PROCEDURE — 1125F AMNT PAIN NOTED PAIN PRSNT: CPT | Performed by: STUDENT IN AN ORGANIZED HEALTH CARE EDUCATION/TRAINING PROGRAM

## 2025-01-15 PROCEDURE — 99214 OFFICE O/P EST MOD 30 MIN: CPT | Performed by: STUDENT IN AN ORGANIZED HEALTH CARE EDUCATION/TRAINING PROGRAM

## 2025-01-15 RX ORDER — CYCLOBENZAPRINE HCL 10 MG
5-10 TABLET ORAL 3 TIMES DAILY PRN
Qty: 30 TABLET | Refills: 0 | Status: SHIPPED | OUTPATIENT
Start: 2025-01-15

## 2025-01-15 ASSESSMENT — PATIENT HEALTH QUESTIONNAIRE - PHQ9
SUM OF ALL RESPONSES TO PHQ QUESTIONS 1-9: 9
5. POOR APPETITE OR OVEREATING: 1 - SEVERAL DAYS
CLINICAL INTERPRETATION OF PHQ2 SCORE: 2

## 2025-01-15 ASSESSMENT — PAIN SCALES - GENERAL: PAINLEVEL_OUTOF10: 9=SEVERE PAIN

## 2025-01-15 NOTE — PROGRESS NOTES
Verbal consent was acquired by the patient to use [employer] ambient listening note generation during this visit Yes     Follow-up patient Note    Interventional Pain and Spine  Physiatry (Physical Medicine and Rehabilitation)     Patient Name: Caridad Nino  : 1980  Date of service: 1/15/2025    Chief Complaint:   Chief Complaint   Patient presents with    Follow-Up     Back pain       HISTORY  Please see new patient note by Dr. Lewis for more details.     HPI  Today's visit   Caridad Nino ( 1980) is a female with Diagnoses of Myalgia, Chronic bilateral thoracic back pain, Iliopsoas bursitis of left hip, Iliopsoas bursitis of right hip, Hip flexor tendinitis, unspecified laterality, Primary osteoarthritis of both hips, and BMI 33.0-33.9,adult were pertinent to this visit.      History of Present Illness    The patient presents for evaluation of mid back pain.    She reports experiencing a sensation akin to a contraction in the middle of her back, which she describes as feeling like a stretch. The onset of this pain is unknown, but it has intensified over the past month to the point of causing emotional distress. She has not sought relief through pain medication or massage therapy. The pain is localized to the area where her bra fastens and does not radiate around her chest. She also reports a sensation of tightness in her back, which occasionally causes her to catch her breath. She has previously found relief from muscle relaxers for leg pain and is open to trying a different one for her current back pain. She has not undergone physical therapy. She has found some relief from hot showers and using a heating pad, but notes that she has been unable to feel the heat on her back for the past week. She has not considered trigger point injections. 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 She has used a TENS unit for arm  pain and is willing to try it for her back pain. Bending forward provides some relief. She has attempted to manage the pain through stretching exercises and has previously tried ibuprofen, which provided temporary relief.    She also reports shoulder pain and lower back pain, which she attributes to aging and previous epidural injections. She has been actively trying to lose weight and improve her health for the sake of her son.    She reports that her hand pain has improved, but she is experiencing increased pain in her other hand, which has not yet undergone surgery. She experienced numbness in her hand during a recent blood pressure check.    MEDICATIONS  Current: ibuprofen, tizanidine    Procedure history:  - 7/9/24  right and left iliopsoas bursa injection ultrasound-guided -significant pain relief, ongoing as of 1/15/2025      ROS:   Red Flags ROS:   Fever, Chills, Sweats: Denies  Involuntary Weight Loss: Denies  Bladder Incontinence: Denies  Bowel Incontinence: denies  Saddle Anesthesia: Denies    All other systems reviewed and negative.     PMHx:   Past Medical History:   Diagnosis Date    Cervical radiculopathy 1/18/2022    Dental disorder     Hemorrhoids 1998    History of shingles June/July 2016    Hypertension 1997    JUST DURING PREGNANCY    Hypertension due to endocrine disorder 8/22/2019    Mixed hyperlipidemia 10/10/2022    Morbid obesity due to excess calories ([facility]) 12/3/2016    Normocytic anemia 8/15/2017    Pneumonia     Snoring     Thrombocytopenia (HCC) 8/14/2017    Thyroid nodule     Type 2 diabetes mellitus without complication, without long-term current use of insulin (HCC)     Vertigo 8/14/2017       PSHx:   Past Surgical History:   Procedure Laterality Date    CHOLECYSTECTOMY  1998       Family Hx:   Family History   Problem Relation Age of Onset    Diabetes Mother     Other Mother         MS    Cancer Mother         colon    Cancer Sister         throat    Alcohol abuse Paternal  Grandmother        Social Hx:  Social History     Socioeconomic History    Marital status:      Spouse name: Not on file    Number of children: Not on file    Years of education: Not on file    Highest education level: Not on file   Occupational History    Not on file   Tobacco Use    Smoking status: Former     Current packs/day: 0.00     Average packs/day: 1 pack/day for 27.3 years (27.3 ttl pk-yrs)     Types: Cigarettes     Start date: 1989     Quit date: 2016     Years since quittin.7    Smokeless tobacco: Never   Vaping Use    Vaping status: Some Days   Substance and Sexual Activity    Alcohol use: Yes     Comment: rarely     Drug use: Yes     Types: Marijuana, Inhaled, Oral     Comment: daily    Sexual activity: Yes     Partners: Male   Other Topics Concern    Not on file   Social History Narrative    Not on file     Social Drivers of Health     Financial Resource Strain: Not on file   Food Insecurity: Not on file   Transportation Needs: Not on file   Physical Activity: Not on file   Stress: Not on file   Social Connections: Not on file   Intimate Partner Violence: Not on file   Housing Stability: Not on file       Allergies:  Allergies   Allergen Reactions    Amoxicillin Hives       Medications: reviewed on epic.   Outpatient Medications Marked as Taking for the 1/15/25 encounter (Office Visit) with Laura Lewis M.D.   Medication Sig Dispense Refill    cyclobenzaprine (FLEXERIL) 10 mg Tab Take 0.5-1 Tablets by mouth 3 times a day as needed for Muscle Spasms or Moderate Pain. 30 Tablet 0    sertraline (ZOLOFT) 25 MG tablet Take 1 Tablet by mouth every day. 30 Tablet 5    Semaglutide, 1 MG/DOSE, (OZEMPIC, 1 MG/DOSE,) 4 MG/3ML Solution Pen-injector INJECT 1 MG UNDER THE SKIN EVERY SEVEN DAYS and repeat weekly 3 mL 2    rosuvastatin (CRESTOR) 5 MG Tab TAKE ONE TABLET BY MOUTH IN THE EVENING 90 Tablet 3    metFORMIN (GLUCOPHAGE) 500 MG Tab Take 1 Tablet by mouth 2 times a day with meals. 180  "Tablet 3    albuterol 108 (90 Base) MCG/ACT Aero Soln inhalation aerosol INHALE 2 PUFFS EVERY FOUR HOURS AS NEEDED FOR SHORTNESS OF BREATH FOR UP TO 14 DAYS.      triamcinolone acetonide (KENALOG) 0.1 % Cream Apply a pea-sized amount to the back of the scalp twice daily for 10 to 14 days 45 g 1        Current Outpatient Medications on File Prior to Visit   Medication Sig Dispense Refill    sertraline (ZOLOFT) 25 MG tablet Take 1 Tablet by mouth every day. 30 Tablet 5    Semaglutide, 1 MG/DOSE, (OZEMPIC, 1 MG/DOSE,) 4 MG/3ML Solution Pen-injector INJECT 1 MG UNDER THE SKIN EVERY SEVEN DAYS and repeat weekly 3 mL 2    rosuvastatin (CRESTOR) 5 MG Tab TAKE ONE TABLET BY MOUTH IN THE EVENING 90 Tablet 3    metFORMIN (GLUCOPHAGE) 500 MG Tab Take 1 Tablet by mouth 2 times a day with meals. 180 Tablet 3    albuterol 108 (90 Base) MCG/ACT Aero Soln inhalation aerosol INHALE 2 PUFFS EVERY FOUR HOURS AS NEEDED FOR SHORTNESS OF BREATH FOR UP TO 14 DAYS.      triamcinolone acetonide (KENALOG) 0.1 % Cream Apply a pea-sized amount to the back of the scalp twice daily for 10 to 14 days 45 g 1    Continuous Blood Gluc Sensor (FREESTYLE MAXWELL 14 DAY SENSOR[place] [facility] [place] one sensor every 14 days (Patient not taking: Reported on 1/15/2025) 6 Each 3     No current facility-administered medications on file prior to visit.         EXAMINATION     Physical Exam:   BP (!) 152/93 (BP Location: Left arm, Patient Position: Sitting, BP Cuff Size: Adult)   Pulse 78   Temp 36.7 °C (98 °F) (Temporal)   Ht 1.702 m (5' 7\")   Wt 95.7 kg (210 lb 15.7 oz)   SpO2 95%     Constitutional:   Body Habitus: Body mass index is 33.04 kg/m².  Cooperation: Fully cooperates with exam  Appearance: Well-groomed, well-nourished.    Eyes: No scleral icterus to suggest severe liver disease, no proptosis to suggest severe hyperthyroidism    ENT -no obvious auditory deficits, no noticeable facial droop     Skin -no rashes or lesions noted     Respiratory-  breathing " comfortably on room air, no audible wheezing    Cardiovascular-distal extremities warm and well perfused.  No lower extremity edema is noted.     Gastrointestinal - no obvious abdominal masses, non-distended    Psychiatric- alert and oriented ×3. Normal affect.     Gait stable, steady    Musculoskeletal    Tenderness to palpation at mid back and bilateral upper trapezius    Cervical spine   Inspection: No deformities of the skin over the cervical spine. No rashes or lesions.    Key points for the international standards for neurological classification of spinal cord injury (ISNCSCI) to light touch.     Dermatome R L   C4 2 2   C5 2 2   C6 2 2   C7 2 2   C8 2 2   T1 2 2   T2 2 2       Motor Exam Upper Extremities   ? Myotome R L   Shoulder abduction C5 5 5   Elbow flexion C5 5 5   Wrist extension C6 5 5   Elbow extension C7 5 5   Finger flexion C8 5 5   Finger abduction T1 5 5       Key points for the international standards for neurological classification of spinal cord injury (ISNCSCI) to light touch.   Dermatome R L   L2 2 2   L3 2 2   L4 2 2   L5 2 2   S1 2 2   S2 2 2       Motor Exam Lower Extremities  ? Myotome R L   Hip flexion L2 5 5   Knee extension L3 5 5   Ankle dorsiflexion L4 5 5   Toe extension L5 5 5   Ankle plantarflexion S1 5 5           MEDICAL DECISION MAKING    Medical records review: see under HPI section.     DATA    Labs: No new labs available for review since last visit.   Lab Results   Component Value Date/Time    SODIUM 138 06/12/2024 09:47 AM    POTASSIUM 4.8 06/12/2024 09:47 AM    CHLORIDE 102 06/12/2024 09:47 AM    CO2 24 06/12/2024 09:47 AM    ANION 12.0 06/12/2024 09:47 AM    GLUCOSE 171 (H) 06/12/2024 09:47 AM    BUN 11 06/12/2024 09:47 AM    CREATININE 0.65 06/12/2024 09:47 AM    CALCIUM 9.8 06/12/2024 09:47 AM    ASTSGOT 14 06/12/2024 09:47 AM    ALTSGPT 16 06/12/2024 09:47 AM    TBILIRUBIN 0.4 06/12/2024 09:47 AM    ALBUMIN 4.3 06/12/2024 09:47 AM    TOTPROTEIN 7.6 06/12/2024 09:47  AM    GLOBULIN 3.3 06/12/2024 09:47 AM    AGRATIO 1.3 06/12/2024 09:47 AM       Lab Results   Component Value Date/Time    PROTHROMBTM 13.0 08/14/2017 01:17 PM    INR 0.95 08/14/2017 01:17 PM        Lab Results   Component Value Date/Time    WBC 6.3 09/13/2022 10:11 AM    RBC 4.78 09/13/2022 10:11 AM    HEMOGLOBIN 13.7 09/13/2022 10:11 AM    HEMATOCRIT 43.5 09/13/2022 10:11 AM    MCV 91.0 09/13/2022 10:11 AM    MCH 28.7 09/13/2022 10:11 AM    MCHC 31.5 (L) 09/13/2022 10:11 AM    MPV 12.6 09/13/2022 10:11 AM    NEUTSPOLYS 55.60 09/13/2022 10:11 AM    LYMPHOCYTES 33.50 09/13/2022 10:11 AM    MONOCYTES 8.00 09/13/2022 10:11 AM    EOSINOPHILS 1.80 09/13/2022 10:11 AM    BASOPHILS 0.50 09/13/2022 10:11 AM        Lab Results   Component Value Date/Time    HBA1C 7.6 (H) 06/12/2024 09:47 AM        Imaging:   I personally reviewed following images, these are my reads  X-ray pelvis 6/20/2024  Mild OA of bilateral hip joints.  See formal radiology report for further details.           IMAGING radiology reads. I reviewed the following radiology reads                  Results for orders placed in visit on 08/21/19    MR-CERVICAL SPINE-W/O    Addendum 8/21/2019 10:44 AM  Addendum:  At the level of C5-6 there is right paracentral disc protrusion causing effacement of the right lateral recess. There is indentation of the right side of the spinal cord contour at this level. There is mild right C6 neural foraminal stenosis.    Impression  1.  Degenerative disease in the cervical spine as described above.  2.  There is loss of cervical lordosis with kyphotic angulation at C6. This may be positional in nature.  3.  There is an approximately 1.3 x 1 cm sized nodule in the right lobe of the thyroid. Ultrasound examination is recommended for further evaluation.                                                    Results for orders placed during the hospital encounter of 06/26/22    DX-CHEST-2 VIEWS    Impression  No evidence of acute  cardiopulmonary process.                                             Diagnosis  Visit Diagnoses     ICD-10-CM   1. Myalgia  M79.10   2. Chronic bilateral thoracic back pain  M54.6    G89.29   3. Iliopsoas bursitis of left hip  M70.72   4. Iliopsoas bursitis of right hip  M70.71   5. Hip flexor tendinitis, unspecified laterality  M76.899   6. Primary osteoarthritis of both hips  M16.0   7. BMI 33.0-33.9,adult  Z68.33           ASSESSMENT AND PLAN:  Caridad Nino (: 1980) is a female with myalgia pain at her mid thoracic back without radicular symptoms, and ongoing significant improvement in pain after bilateral iliopsoas bursa steroid injections      Caridad was seen today for follow-up.    Diagnoses and all orders for this visit:    Myalgia  -     Referral to Pain Clinic    Chronic bilateral thoracic back pain    Iliopsoas bursitis of left hip    Iliopsoas bursitis of right hip    Hip flexor tendinitis, unspecified laterality    Primary osteoarthritis of both hips    BMI 33.0-33.9,adult  -     Patient identified as having weight management issue.  Appropriate orders and counseling given.    Other orders  -     cyclobenzaprine (FLEXERIL) 10 mg Tab; Take 0.5-1 Tablets by mouth 3 times a day as needed for Muscle Spasms or Moderate Pain.            PLAN  Physical Therapy: I previously ordered physical therapy to focus on strengthening and stretching as well as a home exercise program.  Advised the patient to pursue this to minimize the chance that her pain will recur.  She reports that at this time, it would be challenging for her to pursue physical therapy for logistical reasons     Diagnostic workup: no new imaging needed at this time    Medications:   -Prescribe trial of Flexeril today. Counseled on possible side effect of drowsiness and dizziness and discussed that the patient should discontinue this if the side effects are too severe.   Initially take this medication at bedtime.  -Per chart,  status post discontinuation of duloxetine due to gastric upset.     Interventions:   -Bilateral iliopsoas bursa steroid injections under ultrasound guidance as needed  - Trigger point injections under ultrasound guidance. The risks, benefits, and alternatives to this procedure were discussed and the patient wishes to proceed with the procedure. Risks include but are not limited to damage to surrounding structures, infection, bleeding, worsening of pain which can be permanent, and collapsed lung. Benefits include pain relief and improved function. Alternatives include not doing the procedure.    Follow-up: Next available for trigger point injections    Orders Placed This Encounter    Referral to Pain Clinic    Patient identified as having weight management issue.  Appropriate orders and counseling given.    cyclobenzaprine (FLEXERIL) 10 mg Tab       Laura Lewis MD  Interventional Pain and Spine  Physical Medicine and Rehabilitation  [facility]      The above note documents my personal evaluation of this patient. In addition, I have reviewed and confirmed with the patient and MA the supportive information documented in today's Patient Health Questionnaire and Office Note.     Please note that this dictation was created using voice recognition software. I have made every reasonable attempt to correct obvious errors, but I expect that there are errors of grammar and possibly content that I did not discover before finalizing the note.

## 2025-01-17 ENCOUNTER — OFFICE VISIT (OUTPATIENT)
Dept: PHYSICAL MEDICINE AND REHAB | Facility: MEDICAL CENTER | Age: 45
End: 2025-01-17
Payer: COMMERCIAL

## 2025-01-17 VITALS
BODY MASS INDEX: 33.36 KG/M2 | DIASTOLIC BLOOD PRESSURE: 81 MMHG | OXYGEN SATURATION: 95 % | SYSTOLIC BLOOD PRESSURE: 134 MMHG | HEIGHT: 67 IN | WEIGHT: 212.52 LBS | HEART RATE: 74 BPM | TEMPERATURE: 98 F

## 2025-01-17 DIAGNOSIS — M79.10 MYALGIA: ICD-10-CM

## 2025-01-17 PROCEDURE — 20553 NJX 1/MLT TRIGGER POINTS 3/>: CPT | Performed by: STUDENT IN AN ORGANIZED HEALTH CARE EDUCATION/TRAINING PROGRAM

## 2025-01-17 PROCEDURE — 3079F DIAST BP 80-89 MM HG: CPT | Performed by: STUDENT IN AN ORGANIZED HEALTH CARE EDUCATION/TRAINING PROGRAM

## 2025-01-17 PROCEDURE — 1125F AMNT PAIN NOTED PAIN PRSNT: CPT | Performed by: STUDENT IN AN ORGANIZED HEALTH CARE EDUCATION/TRAINING PROGRAM

## 2025-01-17 PROCEDURE — 76942 ECHO GUIDE FOR BIOPSY: CPT | Performed by: STUDENT IN AN ORGANIZED HEALTH CARE EDUCATION/TRAINING PROGRAM

## 2025-01-17 PROCEDURE — 3075F SYST BP GE 130 - 139MM HG: CPT | Performed by: STUDENT IN AN ORGANIZED HEALTH CARE EDUCATION/TRAINING PROGRAM

## 2025-01-17 RX ORDER — BUPIVACAINE HYDROCHLORIDE 5 MG/ML
5 INJECTION, SOLUTION EPIDURAL; INTRACAUDAL ONCE
Status: COMPLETED | OUTPATIENT
Start: 2025-01-17 | End: 2025-01-17

## 2025-01-17 RX ADMIN — BUPIVACAINE HYDROCHLORIDE 5 ML: 5 INJECTION, SOLUTION EPIDURAL; INTRACAUDAL at 10:58

## 2025-01-17 RX ADMIN — Medication 5 ML: at 10:58

## 2025-01-17 ASSESSMENT — PAIN SCALES - GENERAL: PAINLEVEL_OUTOF10: 5=MODERATE PAIN

## 2025-01-17 NOTE — PROCEDURES
Patient Name: Caridad Nino  : 1980  Date of Service: 2025    Physician/s: Laura Lewis MD    Pre-operative Diagnosis: Myalgia (M79.1)    Post-operative Diagnosis: Myalgia (M79.1)    Procedure: trigger point injections of the following muscles:    Site R L   Splenius capitis     Semispinalis capitis     Splenius cervicis     Sternocleidomastoid     Upper trapezius x x   Levator scapulae     Rhomboids     Pectoralis minor     Pectoralis major     Serratus anterior     Supraspinatus     Infraspinatus     Teres minor     Teres major     Quadratus lumborum     Latissimus dorsi          Paravertebral, cervical     Paravertebral, thoracic x x   Paravertebral, lumbar     Gluteus bo     Gluteus medius     Gluteus minimus     Tensor fascia alley     Vastus lateralis     Adductor obey     Adductor longus     Occipitalis     Cervical paraspinal     Trapezius, mid     Trapezius, lower       Description of procedure:    The risks, benefits, and alternatives of the procedure were reviewed and discussed with the patient.  Written informed consent was freely obtained. A pre-procedural time-out was conducted by the physician verifying patient’s identity, procedure to be performed, procedure site and side, and allergy verification. Appropriate equipment was determined to be in place for the procedure.     In the office suite exam room the patient was placed in a prone position and the skin areas for injection over the above muscles were marked. The areas of pain were then prepped and draped in the usual sterile fashion. A solution was prepared with 5 mL of 1% lidocaine and 5 mL of 0.5% bupivacaine. Ultrasound was confirmed to view the adjacent structures for blood vessels and nerves and to confirm the needle path was not within the structures. A 27g needle was placed into each of the markings at the areas above under ultrasound guidance with an out of plane approach. After negative aspiration,  approximately 0.8-1.5 mL of the above solution was injected. The needle was removed intact after each trigger point injection, and the patient's back was covered with a 4x4 gauze, the area was cleansed with sterile normal saline, and a dressing was applied. There were no complications noted. The images were uploaded to our media tab for permanent storage.    Laura Lewis MD  Interventional Pain and Spine  Physical Medicine and Rehabilitation  [facility]

## 2025-01-21 ENCOUNTER — HOSPITAL ENCOUNTER (OUTPATIENT)
Dept: LAB | Facility: MEDICAL CENTER | Age: 45
End: 2025-01-21
Attending: PHYSICIAN ASSISTANT
Payer: COMMERCIAL

## 2025-01-21 DIAGNOSIS — N95.1 PERIMENOPAUSAL: ICD-10-CM

## 2025-01-21 DIAGNOSIS — E11.65 TYPE 2 DIABETES MELLITUS WITH HYPERGLYCEMIA, WITHOUT LONG-TERM CURRENT USE OF INSULIN (HCC): ICD-10-CM

## 2025-01-21 DIAGNOSIS — N92.1 MENORRHAGIA WITH IRREGULAR CYCLE: ICD-10-CM

## 2025-01-21 LAB
ALBUMIN SERPL BCP-MCNC: 4.2 G/DL (ref 3.2–4.9)
ALBUMIN/GLOB SERPL: 1.6 G/DL
ALP SERPL-CCNC: 68 U/L (ref 30–99)
ALT SERPL-CCNC: 12 U/L (ref 2–50)
ANION GAP SERPL CALC-SCNC: 11 MMOL/L (ref 7–16)
AST SERPL-CCNC: 14 U/L (ref 12–45)
BASOPHILS # BLD AUTO: 0.3 % (ref 0–1.8)
BASOPHILS # BLD: 0.03 K/UL (ref 0–0.12)
BILIRUB SERPL-MCNC: 0.2 MG/DL (ref 0.1–1.5)
BUN SERPL-MCNC: 12 MG/DL (ref 8–22)
CALCIUM ALBUM COR SERPL-MCNC: 9 MG/DL (ref 8.5–10.5)
CALCIUM SERPL-MCNC: 9.2 MG/DL (ref 8.5–10.5)
CHLORIDE SERPL-SCNC: 103 MMOL/L (ref 96–112)
CHOLEST SERPL-MCNC: 96 MG/DL (ref 100–199)
CO2 SERPL-SCNC: 26 MMOL/L (ref 20–33)
CREAT SERPL-MCNC: 0.72 MG/DL (ref 0.5–1.4)
CREAT UR-MCNC: 192.58 MG/DL
EOSINOPHIL # BLD AUTO: 0.62 K/UL (ref 0–0.51)
EOSINOPHIL NFR BLD: 6.8 % (ref 0–6.9)
ERYTHROCYTE [DISTWIDTH] IN BLOOD BY AUTOMATED COUNT: 48.3 FL (ref 35.9–50)
EST. AVERAGE GLUCOSE BLD GHB EST-MCNC: 140 MG/DL
ESTRADIOL SERPL-MCNC: 19 PG/ML
FSH SERPL-ACNC: 34.4 MIU/ML
GFR SERPLBLD CREATININE-BSD FMLA CKD-EPI: 104 ML/MIN/1.73 M 2
GLOBULIN SER CALC-MCNC: 2.6 G/DL (ref 1.9–3.5)
GLUCOSE SERPL-MCNC: 99 MG/DL (ref 65–99)
HBA1C MFR BLD: 6.5 % (ref 4–5.6)
HCT VFR BLD AUTO: 41.2 % (ref 37–47)
HDLC SERPL-MCNC: 30 MG/DL
HGB BLD-MCNC: 12.9 G/DL (ref 12–16)
IMM GRANULOCYTES # BLD AUTO: 0.02 K/UL (ref 0–0.11)
IMM GRANULOCYTES NFR BLD AUTO: 0.2 % (ref 0–0.9)
LDLC SERPL CALC-MCNC: 34 MG/DL
LH SERPL-ACNC: 23.8 IU/L
LYMPHOCYTES # BLD AUTO: 2.88 K/UL (ref 1–4.8)
LYMPHOCYTES NFR BLD: 31.5 % (ref 22–41)
MCH RBC QN AUTO: 29.8 PG (ref 27–33)
MCHC RBC AUTO-ENTMCNC: 31.3 G/DL (ref 32.2–35.5)
MCV RBC AUTO: 95.2 FL (ref 81.4–97.8)
MICROALBUMIN UR-MCNC: 1.3 MG/DL
MICROALBUMIN/CREAT UR: 7 MG/G (ref 0–30)
MONOCYTES # BLD AUTO: 0.69 K/UL (ref 0–0.85)
MONOCYTES NFR BLD AUTO: 7.5 % (ref 0–13.4)
NEUTROPHILS # BLD AUTO: 4.9 K/UL (ref 1.82–7.42)
NEUTROPHILS NFR BLD: 53.7 % (ref 44–72)
NRBC # BLD AUTO: 0 K/UL
NRBC BLD-RTO: 0 /100 WBC (ref 0–0.2)
PLATELET # BLD AUTO: 128 K/UL (ref 164–446)
PMV BLD AUTO: 13 FL (ref 9–12.9)
POTASSIUM SERPL-SCNC: 4.2 MMOL/L (ref 3.6–5.5)
PROGEST SERPL-MCNC: 0.25 NG/ML
PROT SERPL-MCNC: 6.8 G/DL (ref 6–8.2)
RBC # BLD AUTO: 4.33 M/UL (ref 4.2–5.4)
SODIUM SERPL-SCNC: 140 MMOL/L (ref 135–145)
TRIGL SERPL-MCNC: 162 MG/DL (ref 0–149)
WBC # BLD AUTO: 9.1 K/UL (ref 4.8–10.8)

## 2025-01-21 PROCEDURE — 82570 ASSAY OF URINE CREATININE: CPT

## 2025-01-21 PROCEDURE — 82670 ASSAY OF TOTAL ESTRADIOL: CPT

## 2025-01-21 PROCEDURE — 82043 UR ALBUMIN QUANTITATIVE: CPT

## 2025-01-21 PROCEDURE — 80053 COMPREHEN METABOLIC PANEL: CPT

## 2025-01-21 PROCEDURE — 83001 ASSAY OF GONADOTROPIN (FSH): CPT

## 2025-01-21 PROCEDURE — 85025 COMPLETE CBC W/AUTO DIFF WBC: CPT

## 2025-01-21 PROCEDURE — 36415 COLL VENOUS BLD VENIPUNCTURE: CPT

## 2025-01-21 PROCEDURE — 83036 HEMOGLOBIN GLYCOSYLATED A1C: CPT

## 2025-01-21 PROCEDURE — 83002 ASSAY OF GONADOTROPIN (LH): CPT

## 2025-01-21 PROCEDURE — 80061 LIPID PANEL: CPT

## 2025-01-21 PROCEDURE — 84144 ASSAY OF PROGESTERONE: CPT

## 2025-01-22 ENCOUNTER — OFFICE VISIT (OUTPATIENT)
Dept: URGENT CARE | Facility: CLINIC | Age: 45
End: 2025-01-22
Payer: COMMERCIAL

## 2025-01-22 VITALS
HEIGHT: 67 IN | WEIGHT: 204 LBS | OXYGEN SATURATION: 96 % | SYSTOLIC BLOOD PRESSURE: 106 MMHG | TEMPERATURE: 97.3 F | BODY MASS INDEX: 32.02 KG/M2 | HEART RATE: 75 BPM | RESPIRATION RATE: 16 BRPM | DIASTOLIC BLOOD PRESSURE: 64 MMHG

## 2025-01-22 DIAGNOSIS — H66.002 NON-RECURRENT ACUTE SUPPURATIVE OTITIS MEDIA OF LEFT EAR WITHOUT SPONTANEOUS RUPTURE OF TYMPANIC MEMBRANE: ICD-10-CM

## 2025-01-22 PROCEDURE — 3074F SYST BP LT 130 MM HG: CPT | Performed by: REGISTERED NURSE

## 2025-01-22 PROCEDURE — 3078F DIAST BP <80 MM HG: CPT | Performed by: REGISTERED NURSE

## 2025-01-22 PROCEDURE — 99214 OFFICE O/P EST MOD 30 MIN: CPT | Performed by: REGISTERED NURSE

## 2025-01-22 RX ORDER — CEFDINIR 300 MG/1
300 CAPSULE ORAL 2 TIMES DAILY
Qty: 14 CAPSULE | Refills: 0 | Status: SHIPPED | OUTPATIENT
Start: 2025-01-22 | End: 2025-01-29

## 2025-01-22 ASSESSMENT — FIBROSIS 4 INDEX: FIB4 SCORE: 1.45

## 2025-01-22 NOTE — LETTER
January 22, 2025         Patient: Caridad Nino   YOB: 1980   Date of Visit: 1/22/2025           To Whom it May Concern:    Caridad Nino was seen in my clinic on 1/22/2025. She may return to work on 01/27/25. Please excuse any missed works.     If you have any questions or concerns, please don't hesitate to call.        Sincerely,           FARRUKH Velazco.  Electronically Signed

## 2025-01-23 NOTE — PROGRESS NOTES
Subjective:   Caridad Nino is a 44 y.o. female who presents for Ear Pain (Left ear pain x1 week and headache today)      HPI  One week of left otalgia with a headache. Used motrin today. No other cold like symptoms. No recent ear/sinus infections. Denies high fever over 102, eye pain, acute vision changes, neck stiffness, equilibrium disturbances, unilateral/bilateral weakness      ROS per hpi    Allergies   Allergen Reactions    Amoxicillin Hives       Patient Active Problem List    Diagnosis Date Noted    ALESSANDRO (generalized anxiety disorder) 10/03/2024    Thyroid nodule 07/25/2024    Chronic pain of both hips 06/10/2024    Obesity (BMI 30-39.9) 09/11/2023    Mixed hyperlipidemia 10/10/2022    Carpal tunnel syndrome of right wrist 09/12/2022    Paronychia of left middle finger 02/22/2022    Cervical radiculopathy 01/18/2022    Dermatitis 01/18/2022    Other hemorrhoids 08/22/2019    Snoring 08/22/2019    Normocytic anemia 08/15/2017    Thrombocytopenia (HCC) 08/14/2017    Type 2 diabetes mellitus with hyperglycemia, without long-term current use of insulin ([facility]) 08/14/2017    Tendinitis of wrist 02/06/2017    Morbid obesity due to excess calories ([facility]) 12/03/2016       Current Outpatient Medications Ordered in Epic   Medication Sig Dispense Refill    cefdinir (OMNICEF) 300 MG Cap Take 1 Capsule by mouth 2 times a day for 7 days. 14 Capsule 0    cyclobenzaprine (FLEXERIL) 10 mg Tab Take 0.5-1 Tablets by mouth 3 times a day as needed for Muscle Spasms or Moderate Pain. 30 Tablet 0    sertraline (ZOLOFT) 25 MG tablet Take 1 Tablet by mouth every day. 30 Tablet 5    Semaglutide, 1 MG/DOSE, (OZEMPIC, 1 MG/DOSE,) 4 MG/3ML Solution Pen-injector INJECT 1 MG UNDER THE SKIN EVERY SEVEN DAYS and repeat weekly 3 mL 2    rosuvastatin (CRESTOR) 5 MG Tab TAKE ONE TABLET BY MOUTH IN THE EVENING 90 Tablet 3    metFORMIN (GLUCOPHAGE) 500 MG Tab Take 1 Tablet by mouth 2 times a day with meals. 180 Tablet 3    albuterol 108  "(90 Base) MCG/ACT Aero Soln inhalation aerosol INHALE 2 PUFFS EVERY FOUR HOURS AS NEEDED FOR SHORTNESS OF BREATH FOR UP TO 14 DAYS.      triamcinolone acetonide (KENALOG) 0.1 % Cream Apply a pea-sized amount to the back of the scalp twice daily for 10 to 14 days 45 g 1    Continuous Blood Gluc Sensor (FREESTYLE MAXWELL 14 DAY SENSOR) Misc Apply one sensor every 14 days 6 Each 3     No current Epic-ordered facility-administered medications on file.       Past Surgical History:   Procedure Laterality Date    CHOLECYSTECTOMY         Social History     Tobacco Use    Smoking status: Former     Current packs/day: 0.00     Average packs/day: 1 pack/day for 27.3 years (27.3 ttl pk-yrs)     Types: Cigarettes     Start date: 1989     Quit date: 2016     Years since quittin.8    Smokeless tobacco: Never   Vaping Use    Vaping status: Some Days   Substance Use Topics    Alcohol use: Yes     Comment: rarely     Drug use: Yes     Types: Marijuana, Inhaled, Oral     Comment: daily       family history includes Alcohol abuse in her paternal grandmother; Cancer in her mother and sister; Diabetes in her mother; Other in her mother.     Problem list, medications, and allergies reviewed by myself today in Epic.     Objective:   /64 (BP Location: Right arm, Patient Position: Sitting, BP Cuff Size: Large adult)   Pulse 75   Temp 36.3 °C (97.3 °F) (Temporal)   Resp 16   Ht 1.702 m (5' 7\")   Wt 92.5 kg (204 lb)   SpO2 96%   BMI 31.95 kg/m²     Physical Exam  Vitals and nursing note reviewed.   Constitutional:       Appearance: Normal appearance. She is not ill-appearing or toxic-appearing.   HENT:      Right Ear: Tympanic membrane, ear canal and external ear normal.      Ears:      Comments: Left TM erythemic with purulent effusion, no bulging     Nose: Congestion present.      Mouth/Throat:      Mouth: Mucous membranes are moist.   Eyes:      Pupils: Pupils are equal, round, and reactive to light. "   Cardiovascular:      Rate and Rhythm: Normal rate and regular rhythm.   Pulmonary:      Effort: Pulmonary effort is normal. No respiratory distress.      Breath sounds: Normal breath sounds.   Musculoskeletal:         General: Normal range of motion.      Cervical back: Normal range of motion.   Skin:     General: Skin is warm and dry.      Capillary Refill: Capillary refill takes less than 2 seconds.      Findings: No rash.   Neurological:      General: No focal deficit present.      Mental Status: She is alert and oriented to person, place, and time.      Cranial Nerves: No cranial nerve deficit.   Psychiatric:         Mood and Affect: Mood normal.         Assessment/Plan:     I personally reviewed prior external notes and test results pertinent to today's visit as well as additional imaging and testing completed in clinic today.    I introduced myself as Higinio Garcia a Nurse Practitioner.    1. Non-recurrent acute suppurative otitis media of left ear without spontaneous rupture of tympanic membrane  cefdinir (OMNICEF) 300 MG Cap      TESTING: no  Vital Signs: WNL  PLAN/MDM: No high fevers, no neck stiffness, no loss of hearing, no ear drainage, no rash. Non-toxic appearance, left middle ear infection, right ear findings normal. Normal throat. No adventitious lung/heart sounds. No red flag signs or symptoms.    Will start on cefdinir for ASOM  OTC analgesics   OTC nonsedating allergy medication  Warm steam shower  Adequate hydration  Reviewed return precautions and ER indications      Please note that this dictation was created using voice recognition software. I have made every reasonable attempt to correct obvious errors, but I expect that there are errors of grammar and possibly content that I did not discover before finalizing the note.    This note was electronically signed by GERMANIA Velazco

## 2025-02-18 ENCOUNTER — APPOINTMENT (OUTPATIENT)
Dept: PHYSICAL MEDICINE AND REHAB | Facility: MEDICAL CENTER | Age: 45
End: 2025-02-18
Payer: COMMERCIAL

## 2025-02-28 NOTE — TELEPHONE ENCOUNTER
Received request via: Patient    Was the patient seen in the last year in this department? Yes    Does the patient have an active prescription (recently filled or refills available) for medication(s) requested?  yes    Pharmacy Name: [id number]    Does the patient have [facility] Plus and need 100-day supply? (This applies to ALL medications) Patient does not have SCP

## 2025-03-03 ENCOUNTER — HOSPITAL ENCOUNTER (OUTPATIENT)
Dept: RADIOLOGY | Facility: MEDICAL CENTER | Age: 45
End: 2025-03-03
Attending: PHYSICIAN ASSISTANT
Payer: COMMERCIAL

## 2025-03-03 DIAGNOSIS — N92.1 MENORRHAGIA WITH IRREGULAR CYCLE: ICD-10-CM

## 2025-03-03 DIAGNOSIS — N95.1 PERIMENOPAUSAL: ICD-10-CM

## 2025-03-03 PROCEDURE — 76830 TRANSVAGINAL US NON-OB: CPT

## 2025-03-04 ENCOUNTER — RESULTS FOLLOW-UP (OUTPATIENT)
Dept: MEDICAL GROUP | Age: 45
End: 2025-03-04

## 2025-03-04 RX ORDER — CYCLOBENZAPRINE HCL 10 MG
5-10 TABLET ORAL 3 TIMES DAILY PRN
Qty: 30 TABLET | Refills: 0 | Status: SHIPPED | OUTPATIENT
Start: 2025-03-04

## 2025-03-14 ENCOUNTER — OFFICE VISIT (OUTPATIENT)
Dept: URGENT CARE | Facility: CLINIC | Age: 45
End: 2025-03-14
Payer: COMMERCIAL

## 2025-03-14 VITALS
SYSTOLIC BLOOD PRESSURE: 118 MMHG | DIASTOLIC BLOOD PRESSURE: 70 MMHG | HEART RATE: 86 BPM | HEIGHT: 67 IN | OXYGEN SATURATION: 96 % | BODY MASS INDEX: 32.02 KG/M2 | RESPIRATION RATE: 20 BRPM | TEMPERATURE: 97.2 F | WEIGHT: 204 LBS

## 2025-03-14 DIAGNOSIS — R10.9 ABDOMINAL PAIN IN FEMALE: ICD-10-CM

## 2025-03-14 DIAGNOSIS — R10.13 DYSPEPSIA: ICD-10-CM

## 2025-03-14 LAB
APPEARANCE UR: CLEAR
BILIRUB UR STRIP-MCNC: NEGATIVE MG/DL
COLOR UR AUTO: YELLOW
GLUCOSE UR STRIP.AUTO-MCNC: NEGATIVE MG/DL
KETONES UR STRIP.AUTO-MCNC: NEGATIVE MG/DL
LEUKOCYTE ESTERASE UR QL STRIP.AUTO: NEGATIVE
NITRITE UR QL STRIP.AUTO: NEGATIVE
PH UR STRIP.AUTO: 5.5 [PH] (ref 5–8)
PROT UR QL STRIP: NEGATIVE MG/DL
RBC UR QL AUTO: NEGATIVE
SP GR UR STRIP.AUTO: 1.02
UROBILINOGEN UR STRIP-MCNC: 0.2 MG/DL

## 2025-03-14 PROCEDURE — 99214 OFFICE O/P EST MOD 30 MIN: CPT | Performed by: PHYSICIAN ASSISTANT

## 2025-03-14 PROCEDURE — 3074F SYST BP LT 130 MM HG: CPT | Performed by: PHYSICIAN ASSISTANT

## 2025-03-14 PROCEDURE — 3078F DIAST BP <80 MM HG: CPT | Performed by: PHYSICIAN ASSISTANT

## 2025-03-14 PROCEDURE — 81002 URINALYSIS NONAUTO W/O SCOPE: CPT | Performed by: PHYSICIAN ASSISTANT

## 2025-03-14 RX ORDER — SUCRALFATE 1 G/1
1 TABLET ORAL
Qty: 120 TABLET | Refills: 3 | Status: SHIPPED | OUTPATIENT
Start: 2025-03-14

## 2025-03-14 RX ORDER — ONDANSETRON 4 MG/1
4 TABLET, ORALLY DISINTEGRATING ORAL EVERY 6 HOURS PRN
Qty: 15 TABLET | Refills: 0 | Status: SHIPPED | OUTPATIENT
Start: 2025-03-14

## 2025-03-14 ASSESSMENT — ENCOUNTER SYMPTOMS
FLATUS: 0
ANOREXIA: 0
HEADACHES: 0
CARDIOVASCULAR NEGATIVE: 1
ABDOMINAL PAIN: 1
BELCHING: 0
MUSCULOSKELETAL NEGATIVE: 1
HEMATOCHEZIA: 0
HEARTBURN: 1
CONSTITUTIONAL NEGATIVE: 1
NAUSEA: 1
CONSTIPATION: 0
BLOOD IN STOOL: 0
VOMITING: 1
RESPIRATORY NEGATIVE: 1
DIARRHEA: 0

## 2025-03-14 ASSESSMENT — FIBROSIS 4 INDEX: FIB4 SCORE: 1.45

## 2025-03-14 NOTE — PROGRESS NOTES
Subjective     Caridad Nino is a very pleasant 44 y.o. female who presents with Abdominal Pain (Upper abdominal pain, vomiting, foul smell when burping  since this morning )            Abdominal Pain  This is a new problem. The current episode started today. The onset quality is sudden. The problem occurs constantly. The problem has been unchanged. The pain is located in the epigastric region. The quality of the pain is aching. The abdominal pain does not radiate. Associated symptoms include nausea and vomiting. Pertinent negatives include no anorexia, belching, constipation, diarrhea, dysuria, flatus, frequency, headaches, hematochezia, hematuria or melena. She has tried antacids for the symptoms. The treatment provided mild relief. Her past medical history is significant for abdominal surgery, gallstones and GERD.       PMH:  has a past medical history of Cervical radiculopathy (1/18/2022), Dental disorder, Hemorrhoids (1998), History of shingles (June/July 2016), Hypertension (1997), Hypertension due to endocrine disorder (8/22/2019), Mixed hyperlipidemia (10/10/2022), Morbid obesity due to excess calories ([facility]) (12/3/2016), Normocytic anemia (8/15/2017), Pneumonia, Snoring, Thrombocytopenia ([facility]) (8/14/2017), Thyroid nodule, Type 2 diabetes mellitus without complication, without long-term current use of insulin ([facility]), and Vertigo (8/14/2017).    She has no past medical history of Addisons disease ([facility]), Allergy, Anesthesia, Anginal syndrome ([facility]), Anxiety, Arrhythmia, Arthritis, Blood clotting disorder ([facility]), Blood transfusion without reported diagnosis, Bowel habit changes, Breath shortness, Carcinoma in situ of respiratory system, Cataract, Clotting disorder ([facility]), Cold, Continuous ambulatory peritoneal dialysis status ([facility]), Coughing blood, Cushings syndrome ([facility]), Depression, Diabetic neuropathy ([facility]), Dialysis patient ([facility]), Encounter for long-term (current) use of other medications, GERD  (gastroesophageal reflux disease), Glaucoma, Gynecological disorder, Heart attack (HCC), Heart murmur, Heart valve disease, Hemorrhagic disorder (HCC), Hepatitis A, Hepatitis B, Hepatitis C, Hiatus hernia syndrome, High cholesterol, HIV (human immunodeficiency virus infection) (HCC), IBD (inflammatory bowel disease), Indigestion, Jaundice, Meningitis, Migraine, Osteoporosis, Pacemaker, Parathyroid disorder (HCC), Pituitary disease (HCC), Pulmonary emphysema (HCC), Rheumatic fever, Seizure (HCC), Sickle cell disease (HCC), Sleep apnea, Substance abuse (HCC), Tuberculosis, Urinary bladder disorder, or Urinary incontinence.  MEDS:   Current Outpatient Medications:     ondansetron (ZOFRAN ODT) 4 MG TABLET DISPERSIBLE, Take 1 Tablet by mouth every 6 hours as needed for Nausea/Vomiting., Disp: 15 Tablet, Rfl: 0    sucralfate (CARAFATE) 1 GM Tab, Take 1 Tablet by mouth 4 Times a Day,Before Meals and at Bedtime., Disp: 120 Tablet, Rfl: 3    cyclobenzaprine (FLEXERIL) 10 mg Tab, Take 0.5-1 Tablets by mouth 3 times a day as needed for Muscle Spasms or Moderate Pain., Disp: 30 Tablet, Rfl: 0    sertraline (ZOLOFT) 25 MG tablet, Take 1 Tablet by mouth every day., Disp: 30 Tablet, Rfl: 5    Semaglutide, 1 MG/DOSE, (OZEMPIC, 1 MG/DOSE,) 4 MG/3ML Solution Pen-injector, INJECT 1 MG UNDER THE SKIN EVERY SEVEN DAYS and repeat weekly, Disp: 3 mL, Rfl: 2    rosuvastatin (CRESTOR) 5 MG Tab, TAKE ONE TABLET BY MOUTH IN THE EVENING, Disp: 90 Tablet, Rfl: 3    metFORMIN (GLUCOPHAGE) 500 MG Tab, Take 1 Tablet by mouth 2 times a day with meals., Disp: 180 Tablet, Rfl: 3    albuterol 108 (90 Base) MCG/ACT Aero Soln inhalation aerosol, INHALE 2 PUFFS EVERY FOUR HOURS AS NEEDED FOR SHORTNESS OF BREATH FOR UP TO 14 DAYS., Disp: , Rfl:     triamcinolone acetonide (KENALOG) 0.1 % Cream, Apply a pea-sized amount to the back of the scalp twice daily for 10 to 14 days, Disp: 45 g, Rfl: 1    Continuous Blood Gluc Sensor (FREESTYLE MAXWELL 14 DAY SENSOR)  "Misc, Apply one sensor every 14 days, Disp: 6 Each, Rfl: 3    doxycycline (VIBRAMYCIN) 100 MG Tab, Take 1 Tablet by mouth 2 times a day for 5 days., Disp: 10 Tablet, Rfl: 0  ALLERGIES:   Allergies   Allergen Reactions    Amoxicillin Hives     SURGHX:   Past Surgical History:   Procedure Laterality Date    CHOLECYSTECTOMY  1998     SOCHX:  reports that she quit smoking about 8 years ago. Her smoking use included cigarettes. She started smoking about 36 years ago. She has a 27.3 pack-year smoking history. She has never used smokeless tobacco. She reports current alcohol use. She reports current drug use. Drugs: Marijuana, Inhaled, and Oral.  FH: family history includes Alcohol abuse in her paternal grandmother; Cancer in her mother and sister; Diabetes in her mother; Other in her mother.      Review of Systems   Constitutional: Negative.    Respiratory: Negative.     Cardiovascular: Negative.    Gastrointestinal:  Positive for abdominal pain, heartburn, nausea and vomiting. Negative for anorexia, blood in stool, constipation, diarrhea, flatus, hematochezia and melena.   Genitourinary:  Negative for dysuria, frequency and hematuria.   Musculoskeletal: Negative.    Neurological:  Negative for headaches.       Medications, Allergies, and current problem list reviewed today in Epic           Objective     /70   Pulse 86   Temp 36.2 °C (97.2 °F) (Temporal)   Resp 20   Ht 1.702 m (5' 7\")   Wt 92.5 kg (204 lb)   SpO2 96%   BMI 31.95 kg/m²      Physical Exam  Vitals and nursing note reviewed.   Constitutional:       General: She is not in acute distress.     Appearance: Normal appearance. She is well-developed. She is not ill-appearing, toxic-appearing or diaphoretic.   HENT:      Head: Normocephalic and atraumatic.      Right Ear: Tympanic membrane, ear canal and external ear normal.      Left Ear: Tympanic membrane, ear canal and external ear normal.      Nose: Nose normal. No congestion or rhinorrhea.      " Mouth/Throat:      Mouth: Mucous membranes are moist.      Pharynx: No oropharyngeal exudate or posterior oropharyngeal erythema.   Eyes:      General:         Right eye: No discharge.         Left eye: No discharge.      Conjunctiva/sclera: Conjunctivae normal.   Cardiovascular:      Rate and Rhythm: Normal rate and regular rhythm.      Pulses: Normal pulses.      Heart sounds: Normal heart sounds.   Pulmonary:      Effort: Pulmonary effort is normal. No respiratory distress.      Breath sounds: Normal breath sounds. No wheezing, rhonchi or rales.   Abdominal:      General: Abdomen is flat. Bowel sounds are normal.      Palpations: Abdomen is soft.      Tenderness: There is abdominal tenderness in the epigastric area. There is no right CVA tenderness, left CVA tenderness, guarding or rebound. Negative signs include McBurney's sign.       Musculoskeletal:      Cervical back: Normal range of motion and neck supple.      Right lower leg: No edema.      Left lower leg: No edema.   Lymphadenopathy:      Cervical: No cervical adenopathy.   Skin:     General: Skin is warm and dry.   Neurological:      General: No focal deficit present.      Mental Status: She is alert and oriented to person, place, and time. Mental status is at baseline.   Psychiatric:         Mood and Affect: Mood normal.         Behavior: Behavior normal.         Thought Content: Thought content normal.         Judgment: Judgment normal.                                  Assessment & Plan  Dyspepsia  Dyspepsia for the past few weeks.  Her partner is noted foul-smelling belches.  Symptoms appear worse at night and after eating.  There is no hematochezia, melena, hematic emesis, UTI symptoms or fever.  Previous cholecystectomy vital signs normal and exam reassuring.  Slight epigastric TTP without rebound or guarding.  No McBurney's point tenderness.  Remainder of exam unremarkable.  Could be dyspepsia related to dietary causes or Ozempic use.  Certainly no  signs of acute abdomen.  Trial medication for symptomatic relief, food hygiene and dietary changes.  If no improvement consider H. pylori testing.    Orders:    H.PYLORI STOOL ANTIGEN; Future    ondansetron (ZOFRAN ODT) 4 MG TABLET DISPERSIBLE; Take 1 Tablet by mouth every 6 hours as needed for Nausea/Vomiting.    sucralfate (CARAFATE) 1 GM Tab; Take 1 Tablet by mouth 4 Times a Day,Before Meals and at Bedtime.    Abdominal pain in female    Orders:    H.PYLORI STOOL ANTIGEN; Future    POCT Urinalysis    ondansetron (ZOFRAN ODT) 4 MG TABLET DISPERSIBLE; Take 1 Tablet by mouth every 6 hours as needed for Nausea/Vomiting.    sucralfate (CARAFATE) 1 GM Tab; Take 1 Tablet by mouth 4 Times a Day,Before Meals and at Bedtime.           I personally reviewed prior external notes and test results pertinent to today's visit. Return to clinic or go to ED if symptoms worsen or persist. Red flag symptoms and indications for ED discussed at length. Patient/Parent/Guardian voices understanding.  AVS with post-visit instructions printed and provided or given verbally.  Follow-up with your primary care provider in 3-5 days. All side effects and potential interactions of prescribed medication discussed including allergic response, GI upset, tendon injury, rash, sedation, OCP effectiveness, etc.    Please note that this dictation was created using voice recognition software. I have made every reasonable attempt to correct obvious errors, but I expect that there are errors of grammar and possibly content that I did not discover before finalizing the note.

## 2025-03-17 ENCOUNTER — OFFICE VISIT (OUTPATIENT)
Dept: URGENT CARE | Facility: CLINIC | Age: 45
End: 2025-03-17
Payer: COMMERCIAL

## 2025-03-17 VITALS
RESPIRATION RATE: 24 BRPM | OXYGEN SATURATION: 97 % | TEMPERATURE: 98 F | HEIGHT: 67 IN | BODY MASS INDEX: 31.71 KG/M2 | WEIGHT: 202 LBS | DIASTOLIC BLOOD PRESSURE: 58 MMHG | HEART RATE: 98 BPM | SYSTOLIC BLOOD PRESSURE: 94 MMHG

## 2025-03-17 DIAGNOSIS — H66.001 NON-RECURRENT ACUTE SUPPURATIVE OTITIS MEDIA OF RIGHT EAR WITHOUT SPONTANEOUS RUPTURE OF TYMPANIC MEMBRANE: ICD-10-CM

## 2025-03-17 PROCEDURE — 99213 OFFICE O/P EST LOW 20 MIN: CPT | Performed by: FAMILY MEDICINE

## 2025-03-17 PROCEDURE — 3074F SYST BP LT 130 MM HG: CPT | Performed by: FAMILY MEDICINE

## 2025-03-17 PROCEDURE — 3078F DIAST BP <80 MM HG: CPT | Performed by: FAMILY MEDICINE

## 2025-03-17 RX ORDER — DOXYCYCLINE HYCLATE 100 MG
100 TABLET ORAL 2 TIMES DAILY
Qty: 10 TABLET | Refills: 0 | Status: SHIPPED | OUTPATIENT
Start: 2025-03-17 | End: 2025-03-22

## 2025-03-17 ASSESSMENT — FIBROSIS 4 INDEX: FIB4 SCORE: 1.45

## 2025-03-17 NOTE — PROGRESS NOTES
"  Subjective:      44 y.o. female presents to urgent care for cold symptoms that started on the weekend. She is experiencing ringing sensation to her right ear, popping sensation to right ear, and cough. No fever or sore throat. No tobacco product use.  She does have asthma for which she uses albuterol as needed. She has not needed it any more lately.  She is vaccinated against COVID.  No known sick contacts    She denies any other questions or concerns at this time.    Current problem list, medication, and past medical/surgical history were reviewed in Epic.    ROS  See HPI     Objective:      BP 94/58 (BP Location: Left arm, Patient Position: Sitting, BP Cuff Size: Large adult)   Pulse 98   Temp 36.7 °C (98 °F) (Temporal)   Resp (!) 24   Ht 1.702 m (5' 7\")   Wt 91.6 kg (202 lb)   SpO2 97%   BMI 31.64 kg/m²     Physical Exam  Constitutional:       General: She is not in acute distress.     Appearance: She is not diaphoretic.   HENT:      Right Ear: Ear canal and external ear normal. Tympanic membrane is erythematous and bulging.      Left Ear: Tympanic membrane, ear canal and external ear normal.      Mouth/Throat:      Tongue: Tongue does not deviate from midline.      Palate: No lesions.      Pharynx: No posterior oropharyngeal erythema.   Cardiovascular:      Rate and Rhythm: Normal rate and regular rhythm.      Heart sounds: Normal heart sounds.   Pulmonary:      Effort: Pulmonary effort is normal. No respiratory distress.      Breath sounds: Normal breath sounds.   Neurological:      Mental Status: She is alert.   Psychiatric:         Mood and Affect: Affect normal.         Judgment: Judgment normal.       Assessment/Plan:     1. Non-recurrent acute suppurative otitis media of right ear without spontaneous rupture of tympanic membrane  She is penicillin allergic, prescription for doxycycline has been sent.  Tylenol, ibuprofen, rest, and hydration as needed for symptomatic relief.  - doxycycline " (VIBRAMYCIN) 100 MG Tab; Take 1 Tablet by mouth 2 times a day for 5 days.  Dispense: 10 Tablet; Refill: 0      Instructed to return to Urgent Care or nearest Emergency Department if symptoms fail to improve, for any change in condition, further concerns, or new concerning symptoms. Patient states understanding of the plan of care and discharge instructions.    Vida Rojas M.D.

## 2025-03-17 NOTE — LETTER
March 17, 2025    To Whom It May Concern:         This is confirmation that Caridad Raman Mica attended her scheduled appointment with Vida Rojas M.D. on 3/17/25. She may return to work on Wednesday without any restrictions.          If you have any questions please do not hesitate to call me at the phone number listed below.    Sincerely,          Vida Rojas M.D.  [phone]

## 2025-04-02 DIAGNOSIS — E11.65 TYPE 2 DIABETES MELLITUS WITH HYPERGLYCEMIA, WITHOUT LONG-TERM CURRENT USE OF INSULIN (HCC): ICD-10-CM

## 2025-04-03 RX ORDER — SEMAGLUTIDE 1.34 MG/ML
INJECTION, SOLUTION SUBCUTANEOUS
Qty: 3 ML | Refills: 0 | Status: SHIPPED | OUTPATIENT
Start: 2025-04-03

## 2025-04-14 ENCOUNTER — OFFICE VISIT (OUTPATIENT)
Dept: MEDICAL GROUP | Age: 45
End: 2025-04-14
Payer: COMMERCIAL

## 2025-04-14 VITALS
BODY MASS INDEX: 32.49 KG/M2 | SYSTOLIC BLOOD PRESSURE: 126 MMHG | WEIGHT: 207 LBS | HEIGHT: 67 IN | DIASTOLIC BLOOD PRESSURE: 82 MMHG | TEMPERATURE: 97.1 F | OXYGEN SATURATION: 98 % | HEART RATE: 92 BPM

## 2025-04-14 DIAGNOSIS — D69.6 THROMBOCYTOPENIA (HCC): ICD-10-CM

## 2025-04-14 DIAGNOSIS — E11.65 TYPE 2 DIABETES MELLITUS WITH HYPERGLYCEMIA, WITHOUT LONG-TERM CURRENT USE OF INSULIN (HCC): ICD-10-CM

## 2025-04-14 DIAGNOSIS — K59.00 CONSTIPATION, UNSPECIFIED CONSTIPATION TYPE: ICD-10-CM

## 2025-04-14 DIAGNOSIS — F41.1 GAD (GENERALIZED ANXIETY DISORDER): ICD-10-CM

## 2025-04-14 PROCEDURE — 3079F DIAST BP 80-89 MM HG: CPT | Performed by: PHYSICIAN ASSISTANT

## 2025-04-14 PROCEDURE — 3074F SYST BP LT 130 MM HG: CPT | Performed by: PHYSICIAN ASSISTANT

## 2025-04-14 PROCEDURE — 99214 OFFICE O/P EST MOD 30 MIN: CPT | Performed by: PHYSICIAN ASSISTANT

## 2025-04-14 ASSESSMENT — FIBROSIS 4 INDEX: FIB4 SCORE: 1.45

## 2025-04-14 NOTE — PROGRESS NOTES
Subjective:     History of Present Illness  The patient presents for evaluation of constipation, diabetes mellitus, and mood disorder.    She reports experiencing severe constipation, describing it as more intense than childbirth. She has been struggling with this issue for the past 6 weeks, often spending up to 20 minutes on the toilet without success. When she is able to defecate, the stool is either small and hard or large and painful to pass. She also mentions the presence of hemorrhoids. Approximately 3 to 4 weeks ago, she sought urgent care due to severe constipation, abdominal pain, and excessive belching. She was prescribed Carafate, which she finds difficult to swallow due to its size. She has been adhering to a regimen of stool softeners three times daily for a week.    She expresses concern over recent lab results, which indicated elevated eosinophils and low platelets. She reports no history of low platelets or bleeding disorders in her family. Her mother had cystic fibrosis. She is currently on a low dose of sertraline but does not perceive any improvement in her mood.    She is currently on a regimen of Ozempic 1 mg and metformin for her diabetes. She forgot to take her Ozempic injection last night and administered it this morning instead. She alternates the injection site between her left and right side each week. She has noticed that she experiences less illness when she does not pinch the skin during injection.    FAMILY HISTORY  Her mother had cystic fibrosis.    MEDICATIONS  Current: semaglutide, metformin, sertraline, rosuvastatin      Current medicines (including changes today)  Current Outpatient Medications   Medication Sig Dispense Refill    Semaglutide, 1 MG/DOSE, (OZEMPIC, 1 MG/DOSE,) 4 MG/3ML Solution Pen-injector INJECT 1MG UNDER THE SKIN EVERY 7 DAYS. REPEAT WEEKLY 3 mL 0    ondansetron (ZOFRAN ODT) 4 MG TABLET DISPERSIBLE Take 1 Tablet by mouth every 6 hours as needed for  Nausea/Vomiting. 15 Tablet 0    sucralfate (CARAFATE) 1 GM Tab Take 1 Tablet by mouth 4 Times a Day,Before Meals and at Bedtime. 120 Tablet 3    cyclobenzaprine (FLEXERIL) 10 mg Tab Take 0.5-1 Tablets by mouth 3 times a day as needed for Muscle Spasms or Moderate Pain. 30 Tablet 0    sertraline (ZOLOFT) 25 MG tablet Take 1 Tablet by mouth every day. 30 Tablet 5    rosuvastatin (CRESTOR) 5 MG Tab TAKE ONE TABLET BY MOUTH IN THE EVENING 90 Tablet 3    metFORMIN (GLUCOPHAGE) 500 MG Tab Take 1 Tablet by mouth 2 times a day with meals. 180 Tablet 3    albuterol 108 (90 Base) MCG/ACT Aero Soln inhalation aerosol INHALE 2 PUFFS EVERY FOUR HOURS AS NEEDED FOR SHORTNESS OF BREATH FOR UP TO 14 DAYS.      triamcinolone acetonide (KENALOG) 0.1 % Cream Apply a pea-sized amount to the back of the scalp twice daily for 10 to 14 days 45 g 1    Continuous Blood Gluc Sensor (Portable Medical TechnologySTYLE MAXWELL 14 DAY SENSOR) Misc Apply one sensor every 14 days 6 Each 3     No current facility-administered medications for this visit.     She  has a past medical history of Cervical radiculopathy (1/18/2022), Dental disorder, Hemorrhoids (1998), History of shingles (June/July 2016), Hypertension (1997), Hypertension due to endocrine disorder (8/22/2019), Mixed hyperlipidemia (10/10/2022), Morbid obesity due to excess calories ([facility]) (12/3/2016), Normocytic anemia (8/15/2017), Pneumonia, Snoring, Thrombocytopenia ([facility]) (8/14/2017), Thyroid nodule, Type 2 diabetes mellitus without complication, without long-term current use of insulin ([facility]), and Vertigo (8/14/2017).    She has no past medical history of Addisons disease ([facility]), Allergy, Anesthesia, Anginal syndrome ([facility]), Anxiety, Arrhythmia, Arthritis, Blood clotting disorder ([facility]), Blood transfusion without reported diagnosis, Bowel habit changes, Breath shortness, Carcinoma in situ of respiratory system, Cataract, Clotting disorder ([facility]), Cold, Continuous ambulatory peritoneal dialysis status ([facility]),  "Coughing blood, Cushings syndrome (HCC), Depression, Diabetic neuropathy (HCC), Dialysis patient (HCC), Encounter for long-term (current) use of other medications, GERD (gastroesophageal reflux disease), Glaucoma, Gynecological disorder, Heart attack (HCC), Heart murmur, Heart valve disease, Hemorrhagic disorder (HCC), Hepatitis A, Hepatitis B, Hepatitis C, Hiatus hernia syndrome, High cholesterol, HIV (human immunodeficiency virus infection) (HCC), IBD (inflammatory bowel disease), Indigestion, Jaundice, Meningitis, Migraine, Osteoporosis, Pacemaker, Parathyroid disorder (HCC), Pituitary disease (HCC), Pulmonary emphysema (HCC), Rheumatic fever, Seizure (HCC), Sickle cell disease (HCC), Sleep apnea, Substance abuse (HCC), Tuberculosis, Urinary bladder disorder, or Urinary incontinence.    ROS   No chest pain, no shortness of breath, no abdominal pain  Positive ROS as per HPI.  All other systems reviewed and are negative.     Objective:     /82   Pulse 92   Temp 36.2 °C (97.1 °F) (Temporal)   Ht 1.702 m (5' 7\")   Wt 93.9 kg (207 lb)   SpO2 98%  Body mass index is 32.42 kg/m².   Physical Exam    Constitutional: Alert, no distress.  Skin: Warm, dry, good turgor, no rashes in visible areas.  Eye: Equal, round and reactive, conjunctiva clear, lids normal.  ENMT: Lips without lesions, good dentition, oropharynx clear.  Neck: Trachea midline, no masses, no thyromegaly. No cervical or supraclavicular lymphadenopathy  Respiratory: Unlabored respiratory effort, lungs clear to auscultation, no wheezes, no ronchi.  Cardiovascular: Normal S1, S2, no murmur, no edema.  Abdomen: Soft, non-tender, no masses, no hepatosplenomegaly.  Psych: Alert and oriented x3, normal affect and mood.      Results  Laboratory Studies  Blood sugar levels were 6.5% on 01/21/2025, 9.4%, 10.3%, 7.6%. Total cholesterol is 96. Triglycerides are high. Liver and kidney function are normal. Eosinophils are slightly elevated. Platelets are " low.     Latest Reference Range & Units 01/21/25 09:05   WBC 4.8 - 10.8 K/uL 9.1   RBC 4.20 - 5.40 M/uL 4.33   Hemoglobin 12.0 - 16.0 g/dL 12.9   Hematocrit 37.0 - 47.0 % 41.2   MCV 81.4 - 97.8 fL 95.2   MCH 27.0 - 33.0 pg 29.8   MCHC 32.2 - 35.5 g/dL 31.3 (L)   RDW 35.9 - 50.0 fL 48.3   Platelet Count 164 - 446 K/uL 128 (L)   MPV 9.0 - 12.9 fL 13.0 (H)   Neutrophils-Polys 44.00 - 72.00 % 53.70   Neutrophils (Absolute) 1.82 - 7.42 K/uL 4.90   Lymphocytes 22.00 - 41.00 % 31.50   Lymphs (Absolute) 1.00 - 4.80 K/uL 2.88   Monocytes 0.00 - 13.40 % 7.50   Monos (Absolute) 0.00 - 0.85 K/uL 0.69   Eosinophils 0.00 - 6.90 % 6.80   Eos (Absolute) 0.00 - 0.51 K/uL 0.62 (H)   Basophils 0.00 - 1.80 % 0.30   Baso (Absolute) 0.00 - 0.12 K/uL 0.03   Immature Granulocytes 0.00 - 0.90 % 0.20   Immature Granulocytes (abs) 0.00 - 0.11 K/uL 0.02   Nucleated RBC 0.00 - 0.20 /100 WBC 0.00   NRBC (Absolute) K/uL 0.00   Sodium 135 - 145 mmol/L 140   Potassium 3.6 - 5.5 mmol/L 4.2   Chloride 96 - 112 mmol/L 103   Co2 20 - 33 mmol/L 26   Anion Gap 7.0 - 16.0  11.0   Glucose 65 - 99 mg/dL 99   Bun 8 - 22 mg/dL 12   Creatinine 0.50 - 1.40 mg/dL 0.72   GFR (CKD-EPI) >60 mL/min/1.73 m 2 104   Calcium 8.5 - 10.5 mg/dL 9.2   Correct Calcium 8.5 - 10.5 mg/dL 9.0   AST(SGOT) 12 - 45 U/L 14   ALT(SGPT) 2 - 50 U/L 12   Alkaline Phosphatase 30 - 99 U/L 68   Total Bilirubin 0.1 - 1.5 mg/dL 0.2   Albumin 3.2 - 4.9 g/dL 4.2   Total Protein 6.0 - 8.2 g/dL 6.8   Globulin 1.9 - 3.5 g/dL 2.6   A-G Ratio g/dL 1.6   Glycohemoglobin 4.0 - 5.6 % 6.5 (H)   Estim. Avg Glu mg/dL 140   Cholesterol,Tot 100 - 199 mg/dL 96 (L)   Triglycerides 0 - 149 mg/dL 162 (H)   HDL >=40 mg/dL 30 !   LDL <100 mg/dL 34   Micro Alb Creat Ratio 0 - 30 mg/g 7   Creatinine, Urine mg/dL 192.58   Microalbumin, Urine Random mg/dL 1.3   (L): Data is abnormally low  (H): Data is abnormally high  !: Data is abnormal    Assessment and Plan:   The following treatment plan was  discussed    Assessment & Plan  1. Constipation.  The patient's constipation is likely a side effect of her current Ozempic treatment. She reports severe difficulty with bowel movements, describing significant pain and the presence of hemorrhoids. She has been advised to follow a specific regimen for severe constipation, starting with a stool softener for two days, followed by magnesium citrate, and then transitioning to a daily MiraLAX regimen. The goal is to achieve one or two soft brown bowel movements daily. She has also been advised to consume high-fiber foods such as lentils, broccoli, cauliflower, carrots, celery, and beans, and to drink plenty of water. An abdominal x-ray has been ordered to rule out any obstruction. If her symptoms do not improve, she may need to use a Fleet enema. If she is unable to have a bowel movement, she should seek immediate medical attention at the emergency room.    2. Diabetes Mellitus.  Her blood glucose levels have been well-controlled with the use of Ozempic 1 mg. However, she reports significant constipation, which is a known side effect of the medication. She will continue with the current dose of Ozempic 1 mg. If her constipation improves, she may consider increasing the dose to 2 mg. She will also continue taking metformin as prescribed.    3. Mood disorder.  She is currently on a low dose of sertraline and reports no significant improvement in her mood. The dosage of sertraline will be increased to 50 mg to better manage her anxiety, depression, and mood. She has been informed that diarrhea is a potential side effect of sertraline.    4. Health Maintenance.  Her total cholesterol level is excellent at 96 mg/dL, but her triglycerides remain high, indicating suboptimal dietary habits. She will continue taking rosuvastatin every other day. She has been advised to consume high-fiber foods and avoid processed foods to improve her lipid profile.    () Today's E/M visit is  associated with medical care services that serve as the continuing focal point for all needed health care services and/or with medical care services that are part of ongoing care related to a patient's single, serious condition, or a complex condition: This includes furnishing services to patients on an ongoing basis that result in care that is personalized to the patient. The services result in a comprehensive, longitudinal, and continuous relationship with the patient and involve delivery of team-based care that is accessible, coordinated with other practitioners and providers, and integrated with the broader health care landscape.     ORDERS:  1. Type 2 diabetes mellitus with hyperglycemia, without long-term current use of insulin (HCC)      2. ALESSANDRO (generalized anxiety disorder)      3. Constipation, unspecified constipation type    - [id number] VIEWS; Future          Follow Up: 12 weeks    Please note that this dictation was created using voice recognition software. I have made every reasonable attempt to correct obvious errors, but I expect that there are errors of grammar and possibly content that I did not discover before finalizing the note.      Attestation      Verbal consent was acquired by the patient to use [employer] ambient listening note generation during this visit Yes

## 2025-04-21 ENCOUNTER — TELEPHONE (OUTPATIENT)
Dept: HEALTH INFORMATION MANAGEMENT | Facility: OTHER | Age: 45
End: 2025-04-21

## 2025-05-06 DIAGNOSIS — E11.65 TYPE 2 DIABETES MELLITUS WITH HYPERGLYCEMIA, WITHOUT LONG-TERM CURRENT USE OF INSULIN (HCC): ICD-10-CM

## 2025-05-06 NOTE — PROGRESS NOTES
Patient was having some sensitivities to Ozempic 1 mg weekly so we did not increase the dose.  We did start a regimen to help her constipation.  She has sent me a text stating that she is having normal bowel movements daily with the help of increased fiber and MiraLAX.  She is requesting that I send in 2 mg dose.  This is reasonable.  She will follow-up in clinic as discussed

## 2025-06-02 ENCOUNTER — APPOINTMENT (OUTPATIENT)
Dept: RADIOLOGY | Facility: MEDICAL CENTER | Age: 45
DRG: 392 | End: 2025-06-02
Attending: EMERGENCY MEDICINE
Payer: COMMERCIAL

## 2025-06-02 ENCOUNTER — HOSPITAL ENCOUNTER (INPATIENT)
Facility: MEDICAL CENTER | Age: 45
LOS: 2 days | DRG: 392 | End: 2025-06-04
Attending: EMERGENCY MEDICINE | Admitting: HOSPITALIST
Payer: COMMERCIAL

## 2025-06-02 DIAGNOSIS — R10.13 EPIGASTRIC PAIN: Primary | ICD-10-CM

## 2025-06-02 DIAGNOSIS — K52.9 COLITIS: ICD-10-CM

## 2025-06-02 DIAGNOSIS — R12 HEART BURN: ICD-10-CM

## 2025-06-02 DIAGNOSIS — M25.552 CHRONIC PAIN OF BOTH HIPS: ICD-10-CM

## 2025-06-02 DIAGNOSIS — G89.29 CHRONIC PAIN OF BOTH HIPS: ICD-10-CM

## 2025-06-02 DIAGNOSIS — R19.7 DIARRHEA, UNSPECIFIED TYPE: ICD-10-CM

## 2025-06-02 DIAGNOSIS — J02.9 SORE THROAT: ICD-10-CM

## 2025-06-02 DIAGNOSIS — R11.2 NAUSEA AND VOMITING, UNSPECIFIED VOMITING TYPE: ICD-10-CM

## 2025-06-02 DIAGNOSIS — M25.551 CHRONIC PAIN OF BOTH HIPS: ICD-10-CM

## 2025-06-02 PROBLEM — E87.6 HYPOKALEMIA: Status: ACTIVE | Noted: 2025-06-02

## 2025-06-02 PROBLEM — I44.0 PROLONGED P-R INTERVAL: Status: ACTIVE | Noted: 2025-06-02

## 2025-06-02 PROBLEM — R65.20 SEVERE SEPSIS (HCC): Status: ACTIVE | Noted: 2025-06-02

## 2025-06-02 PROBLEM — J96.01 ACUTE HYPOXIC RESPIRATORY FAILURE (HCC): Status: ACTIVE | Noted: 2025-06-02

## 2025-06-02 PROBLEM — A41.9 SEPSIS (HCC): Status: ACTIVE | Noted: 2025-06-02

## 2025-06-02 PROBLEM — R11.10 INTRACTABLE VOMITING: Status: ACTIVE | Noted: 2025-06-02

## 2025-06-02 PROBLEM — E87.20 LACTIC ACIDOSIS: Status: ACTIVE | Noted: 2025-06-02

## 2025-06-02 PROBLEM — R10.9 INTRACTABLE ABDOMINAL PAIN: Status: ACTIVE | Noted: 2025-06-02

## 2025-06-02 LAB
ALBUMIN SERPL BCP-MCNC: 4.5 G/DL (ref 3.2–4.9)
ALBUMIN/GLOB SERPL: 1.5 G/DL
ALP SERPL-CCNC: 78 U/L (ref 30–99)
ALT SERPL-CCNC: 16 U/L (ref 2–50)
ANION GAP SERPL CALC-SCNC: 12 MMOL/L (ref 7–16)
ANION GAP SERPL CALC-SCNC: 22 MMOL/L (ref 7–16)
APPEARANCE UR: CLEAR
AST SERPL-CCNC: 22 U/L (ref 12–45)
B-OH-BUTYR SERPL-MCNC: 0.16 MMOL/L (ref 0.02–0.27)
BACTERIA #/AREA URNS HPF: NORMAL /HPF
BASE EXCESS BLDA CALC-SCNC: 1 MMOL/L (ref -4–3)
BASOPHILS # BLD AUTO: 0.3 % (ref 0–1.8)
BASOPHILS # BLD: 0.04 K/UL (ref 0–0.12)
BILIRUB SERPL-MCNC: 0.6 MG/DL (ref 0.1–1.5)
BILIRUB UR QL STRIP.AUTO: NEGATIVE
BODY TEMPERATURE: 37.4 CENTIGRADE
BUN SERPL-MCNC: 15 MG/DL (ref 8–22)
BUN SERPL-MCNC: 18 MG/DL (ref 8–22)
CALCIUM ALBUM COR SERPL-MCNC: 9.4 MG/DL (ref 8.5–10.5)
CALCIUM SERPL-MCNC: 7.3 MG/DL (ref 8.5–10.5)
CALCIUM SERPL-MCNC: 9.8 MG/DL (ref 8.5–10.5)
CASTS URNS QL MICRO: NORMAL /LPF (ref 0–2)
CHLORIDE SERPL-SCNC: 105 MMOL/L (ref 96–112)
CHLORIDE SERPL-SCNC: 115 MMOL/L (ref 96–112)
CO2 SERPL-SCNC: 14 MMOL/L (ref 20–33)
CO2 SERPL-SCNC: 16 MMOL/L (ref 20–33)
COLOR UR: YELLOW
CREAT SERPL-MCNC: 0.56 MG/DL (ref 0.5–1.4)
CREAT SERPL-MCNC: 0.77 MG/DL (ref 0.5–1.4)
EKG IMPRESSION: NORMAL
EOSINOPHIL # BLD AUTO: 0.02 K/UL (ref 0–0.51)
EOSINOPHIL NFR BLD: 0.1 % (ref 0–6.9)
EPITHELIAL CELLS 1715: NORMAL /HPF (ref 0–5)
ERYTHROCYTE [DISTWIDTH] IN BLOOD BY AUTOMATED COUNT: 45.5 FL (ref 35.9–50)
GFR SERPLBLD CREATININE-BSD FMLA CKD-EPI: 113 ML/MIN/1.73 M 2
GFR SERPLBLD CREATININE-BSD FMLA CKD-EPI: 96 ML/MIN/1.73 M 2
GLOBULIN SER CALC-MCNC: 3.1 G/DL (ref 1.9–3.5)
GLUCOSE BLD STRIP.AUTO-MCNC: 106 MG/DL (ref 65–99)
GLUCOSE SERPL-MCNC: 197 MG/DL (ref 65–99)
GLUCOSE SERPL-MCNC: 95 MG/DL (ref 65–99)
GLUCOSE UR STRIP.AUTO-MCNC: 250 MG/DL
HCG SERPL QL: NEGATIVE
HCO3 BLDA-SCNC: 25 MMOL/L (ref 21–28)
HCT VFR BLD AUTO: 44.6 % (ref 37–47)
HGB BLD-MCNC: 14.8 G/DL (ref 12–16)
IMM GRANULOCYTES # BLD AUTO: 0.11 K/UL (ref 0–0.11)
IMM GRANULOCYTES NFR BLD AUTO: 0.7 % (ref 0–0.9)
KETONES UR STRIP.AUTO-MCNC: 15 MG/DL
LACTATE SERPL-SCNC: 1.5 MMOL/L (ref 0.5–2)
LACTATE SERPL-SCNC: 2.3 MMOL/L (ref 0.5–2)
LEUKOCYTE ESTERASE UR QL STRIP.AUTO: NEGATIVE
LIPASE SERPL-CCNC: 28 U/L (ref 11–82)
LYMPHOCYTES # BLD AUTO: 1.34 K/UL (ref 1–4.8)
LYMPHOCYTES NFR BLD: 8.6 % (ref 22–41)
MCH RBC QN AUTO: 29.3 PG (ref 27–33)
MCHC RBC AUTO-ENTMCNC: 33.2 G/DL (ref 32.2–35.5)
MCV RBC AUTO: 88.3 FL (ref 81.4–97.8)
MICRO URNS: ABNORMAL
MONOCYTES # BLD AUTO: 0.8 K/UL (ref 0–0.85)
MONOCYTES NFR BLD AUTO: 5.1 % (ref 0–13.4)
NEUTROPHILS # BLD AUTO: 13.27 K/UL (ref 1.82–7.42)
NEUTROPHILS NFR BLD: 85.2 % (ref 44–72)
NITRITE UR QL STRIP.AUTO: NEGATIVE
NRBC # BLD AUTO: 0 K/UL
NRBC BLD-RTO: 0 /100 WBC (ref 0–0.2)
PCO2 BLDA: 38.9 MMHG (ref 32–48)
PCO2 TEMP ADJ BLDA: 39.5 MMHG (ref 32–48)
PH BLDA: 7.42 [PH] (ref 7.35–7.45)
PH TEMP ADJ BLDA: 7.41 [PH] (ref 7.35–7.45)
PH UR STRIP.AUTO: 7 [PH] (ref 5–8)
PLATELET # BLD AUTO: 185 K/UL (ref 164–446)
PMV BLD AUTO: 11.6 FL (ref 9–12.9)
PO2 BLDA: 46.3 MMHG (ref 83–108)
PO2 TEMP ADJ BLDA: 47.6 MMHG (ref 83–108)
POTASSIUM SERPL-SCNC: 3.4 MMOL/L (ref 3.6–5.5)
POTASSIUM SERPL-SCNC: 3.9 MMOL/L (ref 3.6–5.5)
PROT SERPL-MCNC: 7.6 G/DL (ref 6–8.2)
PROT UR QL STRIP: 30 MG/DL
RBC # BLD AUTO: 5.05 M/UL (ref 4.2–5.4)
RBC # URNS HPF: NORMAL /HPF (ref 0–2)
RBC UR QL AUTO: NEGATIVE
SAO2 % BLDA: 52 % (ref 93–99)
SODIUM SERPL-SCNC: 141 MMOL/L (ref 135–145)
SODIUM SERPL-SCNC: 143 MMOL/L (ref 135–145)
SP GR UR STRIP.AUTO: >1.045
TROPONIN T SERPL-MCNC: 8 NG/L (ref 6–19)
UROBILINOGEN UR STRIP.AUTO-MCNC: 0.2 EU/DL
WBC # BLD AUTO: 15.6 K/UL (ref 4.8–10.8)
WBC #/AREA URNS HPF: NORMAL /HPF

## 2025-06-02 PROCEDURE — 93005 ELECTROCARDIOGRAM TRACING: CPT | Mod: TC | Performed by: EMERGENCY MEDICINE

## 2025-06-02 PROCEDURE — 700117 HCHG RX CONTRAST REV CODE 255: Performed by: EMERGENCY MEDICINE

## 2025-06-02 PROCEDURE — 71045 X-RAY EXAM CHEST 1 VIEW: CPT

## 2025-06-02 PROCEDURE — 84703 CHORIONIC GONADOTROPIN ASSAY: CPT

## 2025-06-02 PROCEDURE — 83690 ASSAY OF LIPASE: CPT

## 2025-06-02 PROCEDURE — 80048 BASIC METABOLIC PNL TOTAL CA: CPT

## 2025-06-02 PROCEDURE — 74177 CT ABD & PELVIS W/CONTRAST: CPT

## 2025-06-02 PROCEDURE — 82962 GLUCOSE BLOOD TEST: CPT | Mod: 91

## 2025-06-02 PROCEDURE — 700102 HCHG RX REV CODE 250 W/ 637 OVERRIDE(OP)

## 2025-06-02 PROCEDURE — 700105 HCHG RX REV CODE 258: Performed by: EMERGENCY MEDICINE

## 2025-06-02 PROCEDURE — 700111 HCHG RX REV CODE 636 W/ 250 OVERRIDE (IP)

## 2025-06-02 PROCEDURE — 81001 URINALYSIS AUTO W/SCOPE: CPT

## 2025-06-02 PROCEDURE — 770006 HCHG ROOM/CARE - MED/SURG/GYN SEMI*

## 2025-06-02 PROCEDURE — 80053 COMPREHEN METABOLIC PANEL: CPT

## 2025-06-02 PROCEDURE — 85025 COMPLETE CBC W/AUTO DIFF WBC: CPT

## 2025-06-02 PROCEDURE — 84484 ASSAY OF TROPONIN QUANT: CPT

## 2025-06-02 PROCEDURE — 83605 ASSAY OF LACTIC ACID: CPT

## 2025-06-02 PROCEDURE — 700105 HCHG RX REV CODE 258

## 2025-06-02 PROCEDURE — 700111 HCHG RX REV CODE 636 W/ 250 OVERRIDE (IP): Mod: JZ | Performed by: EMERGENCY MEDICINE

## 2025-06-02 PROCEDURE — A9270 NON-COVERED ITEM OR SERVICE: HCPCS

## 2025-06-02 PROCEDURE — 99223 1ST HOSP IP/OBS HIGH 75: CPT | Mod: GC | Performed by: HOSPITALIST

## 2025-06-02 PROCEDURE — 82803 BLOOD GASES ANY COMBINATION: CPT

## 2025-06-02 PROCEDURE — 82010 KETONE BODYS QUAN: CPT

## 2025-06-02 PROCEDURE — 36415 COLL VENOUS BLD VENIPUNCTURE: CPT

## 2025-06-02 RX ORDER — ONDANSETRON 2 MG/ML
4 INJECTION INTRAMUSCULAR; INTRAVENOUS EVERY 4 HOURS PRN
Status: DISCONTINUED | OUTPATIENT
Start: 2025-06-02 | End: 2025-06-04 | Stop reason: HOSPADM

## 2025-06-02 RX ORDER — PROCHLORPERAZINE EDISYLATE 5 MG/ML
5-10 INJECTION INTRAMUSCULAR; INTRAVENOUS EVERY 4 HOURS PRN
Status: DISCONTINUED | OUTPATIENT
Start: 2025-06-02 | End: 2025-06-04 | Stop reason: HOSPADM

## 2025-06-02 RX ORDER — ALBUTEROL SULFATE 90 UG/1
1-2 INHALANT RESPIRATORY (INHALATION) EVERY 4 HOURS PRN
COMMUNITY

## 2025-06-02 RX ORDER — OXYCODONE HYDROCHLORIDE 5 MG/1
2.5 TABLET ORAL
Refills: 0 | Status: DISCONTINUED | OUTPATIENT
Start: 2025-06-02 | End: 2025-06-04 | Stop reason: HOSPADM

## 2025-06-02 RX ORDER — SODIUM CHLORIDE, SODIUM LACTATE, POTASSIUM CHLORIDE, CALCIUM CHLORIDE 600; 310; 30; 20 MG/100ML; MG/100ML; MG/100ML; MG/100ML
1000 INJECTION, SOLUTION INTRAVENOUS ONCE
Status: COMPLETED | OUTPATIENT
Start: 2025-06-02 | End: 2025-06-02

## 2025-06-02 RX ORDER — ROSUVASTATIN CALCIUM 5 MG/1
5 TABLET, COATED ORAL EVERY EVENING
Status: DISCONTINUED | OUTPATIENT
Start: 2025-06-02 | End: 2025-06-04 | Stop reason: HOSPADM

## 2025-06-02 RX ORDER — METRONIDAZOLE 500 MG/100ML
500 INJECTION, SOLUTION INTRAVENOUS EVERY 12 HOURS
Status: DISCONTINUED | OUTPATIENT
Start: 2025-06-02 | End: 2025-06-04 | Stop reason: HOSPADM

## 2025-06-02 RX ORDER — POTASSIUM CHLORIDE 7.45 MG/ML
10 INJECTION INTRAVENOUS
Status: COMPLETED | OUTPATIENT
Start: 2025-06-02 | End: 2025-06-02

## 2025-06-02 RX ORDER — INSULIN LISPRO 100 [IU]/ML
1-6 INJECTION, SOLUTION INTRAVENOUS; SUBCUTANEOUS EVERY 6 HOURS
Status: DISCONTINUED | OUTPATIENT
Start: 2025-06-02 | End: 2025-06-04

## 2025-06-02 RX ORDER — ONDANSETRON 2 MG/ML
4 INJECTION INTRAMUSCULAR; INTRAVENOUS ONCE
Status: COMPLETED | OUTPATIENT
Start: 2025-06-02 | End: 2025-06-02

## 2025-06-02 RX ORDER — ENOXAPARIN SODIUM 100 MG/ML
40 INJECTION SUBCUTANEOUS DAILY
Status: DISCONTINUED | OUTPATIENT
Start: 2025-06-02 | End: 2025-06-04 | Stop reason: HOSPADM

## 2025-06-02 RX ORDER — HYDROMORPHONE HYDROCHLORIDE 1 MG/ML
0.5 INJECTION, SOLUTION INTRAMUSCULAR; INTRAVENOUS; SUBCUTANEOUS ONCE
Status: COMPLETED | OUTPATIENT
Start: 2025-06-02 | End: 2025-06-02

## 2025-06-02 RX ORDER — HYDROMORPHONE HYDROCHLORIDE 1 MG/ML
1 INJECTION, SOLUTION INTRAMUSCULAR; INTRAVENOUS; SUBCUTANEOUS ONCE
Status: COMPLETED | OUTPATIENT
Start: 2025-06-02 | End: 2025-06-02

## 2025-06-02 RX ORDER — SODIUM CHLORIDE, SODIUM LACTATE, POTASSIUM CHLORIDE, AND CALCIUM CHLORIDE .6; .31; .03; .02 G/100ML; G/100ML; G/100ML; G/100ML
1000 INJECTION, SOLUTION INTRAVENOUS ONCE
Status: COMPLETED | OUTPATIENT
Start: 2025-06-02 | End: 2025-06-02

## 2025-06-02 RX ORDER — PROMETHAZINE HYDROCHLORIDE 25 MG/1
12.5-25 SUPPOSITORY RECTAL EVERY 4 HOURS PRN
Status: DISCONTINUED | OUTPATIENT
Start: 2025-06-02 | End: 2025-06-04 | Stop reason: HOSPADM

## 2025-06-02 RX ORDER — ONDANSETRON 4 MG/1
4 TABLET, ORALLY DISINTEGRATING ORAL EVERY 4 HOURS PRN
Status: DISCONTINUED | OUTPATIENT
Start: 2025-06-02 | End: 2025-06-04 | Stop reason: HOSPADM

## 2025-06-02 RX ORDER — PROMETHAZINE HYDROCHLORIDE 25 MG/1
12.5-25 TABLET ORAL EVERY 4 HOURS PRN
Status: DISCONTINUED | OUTPATIENT
Start: 2025-06-02 | End: 2025-06-04 | Stop reason: HOSPADM

## 2025-06-02 RX ORDER — OXYCODONE HYDROCHLORIDE 5 MG/1
5 TABLET ORAL
Refills: 0 | Status: DISCONTINUED | OUTPATIENT
Start: 2025-06-02 | End: 2025-06-04 | Stop reason: HOSPADM

## 2025-06-02 RX ORDER — SODIUM CHLORIDE, SODIUM LACTATE, POTASSIUM CHLORIDE, CALCIUM CHLORIDE 600; 310; 30; 20 MG/100ML; MG/100ML; MG/100ML; MG/100ML
INJECTION, SOLUTION INTRAVENOUS CONTINUOUS
Status: ACTIVE | OUTPATIENT
Start: 2025-06-02 | End: 2025-06-03

## 2025-06-02 RX ORDER — ACETAMINOPHEN 500 MG
1000 TABLET ORAL EVERY 6 HOURS
Status: DISCONTINUED | OUTPATIENT
Start: 2025-06-02 | End: 2025-06-04 | Stop reason: HOSPADM

## 2025-06-02 RX ORDER — DEXTROSE MONOHYDRATE 25 G/50ML
25 INJECTION, SOLUTION INTRAVENOUS
Status: DISCONTINUED | OUTPATIENT
Start: 2025-06-02 | End: 2025-06-04

## 2025-06-02 RX ORDER — HYDROMORPHONE HYDROCHLORIDE 1 MG/ML
0.25 INJECTION, SOLUTION INTRAMUSCULAR; INTRAVENOUS; SUBCUTANEOUS
Status: DISCONTINUED | OUTPATIENT
Start: 2025-06-02 | End: 2025-06-04 | Stop reason: HOSPADM

## 2025-06-02 RX ADMIN — ONDANSETRON 4 MG: 2 INJECTION INTRAMUSCULAR; INTRAVENOUS at 19:40

## 2025-06-02 RX ADMIN — ACETAMINOPHEN 1000 MG: 500 TABLET ORAL at 23:57

## 2025-06-02 RX ADMIN — ROSUVASTATIN CALCIUM 5 MG: 5 TABLET, FILM COATED ORAL at 22:16

## 2025-06-02 RX ADMIN — SODIUM CHLORIDE, POTASSIUM CHLORIDE, SODIUM LACTATE AND CALCIUM CHLORIDE 1000 ML: 600; 310; 30; 20 INJECTION, SOLUTION INTRAVENOUS at 22:15

## 2025-06-02 RX ADMIN — OXYCODONE 2.5 MG: 5 TABLET ORAL at 21:21

## 2025-06-02 RX ADMIN — SERTRALINE 50 MG: 50 TABLET, FILM COATED ORAL at 21:20

## 2025-06-02 RX ADMIN — METRONIDAZOLE 500 MG: 500 INJECTION, SOLUTION INTRAVENOUS at 21:12

## 2025-06-02 RX ADMIN — SODIUM CHLORIDE, POTASSIUM CHLORIDE, SODIUM LACTATE AND CALCIUM CHLORIDE: 600; 310; 30; 20 INJECTION, SOLUTION INTRAVENOUS at 23:59

## 2025-06-02 RX ADMIN — IOHEXOL 98 ML: 350 INJECTION, SOLUTION INTRAVENOUS at 16:15

## 2025-06-02 RX ADMIN — ONDANSETRON 4 MG: 2 INJECTION INTRAMUSCULAR; INTRAVENOUS at 13:32

## 2025-06-02 RX ADMIN — HYDROMORPHONE HYDROCHLORIDE 1 MG: 1 INJECTION, SOLUTION INTRAMUSCULAR; INTRAVENOUS; SUBCUTANEOUS at 13:32

## 2025-06-02 RX ADMIN — POTASSIUM CHLORIDE 10 MEQ: 7.46 INJECTION, SOLUTION INTRAVENOUS at 21:15

## 2025-06-02 RX ADMIN — HYDROMORPHONE HYDROCHLORIDE 0.5 MG: 1 INJECTION, SOLUTION INTRAMUSCULAR; INTRAVENOUS; SUBCUTANEOUS at 17:05

## 2025-06-02 RX ADMIN — POTASSIUM CHLORIDE 10 MEQ: 7.46 INJECTION, SOLUTION INTRAVENOUS at 22:18

## 2025-06-02 RX ADMIN — SODIUM CHLORIDE, POTASSIUM CHLORIDE, SODIUM LACTATE AND CALCIUM CHLORIDE 1000 ML: 600; 310; 30; 20 INJECTION, SOLUTION INTRAVENOUS at 18:10

## 2025-06-02 RX ADMIN — ONDANSETRON 4 MG: 2 INJECTION INTRAMUSCULAR; INTRAVENOUS at 17:05

## 2025-06-02 RX ADMIN — ACETAMINOPHEN 1000 MG: 500 TABLET ORAL at 20:28

## 2025-06-02 SDOH — ECONOMIC STABILITY: TRANSPORTATION INSECURITY
IN THE PAST 12 MONTHS, HAS LACK OF RELIABLE TRANSPORTATION KEPT YOU FROM MEDICAL APPOINTMENTS, MEETINGS, WORK OR FROM GETTING THINGS NEEDED FOR DAILY LIVING?: NO

## 2025-06-02 SDOH — ECONOMIC STABILITY: TRANSPORTATION INSECURITY
IN THE PAST 12 MONTHS, HAS THE LACK OF TRANSPORTATION KEPT YOU FROM MEDICAL APPOINTMENTS OR FROM GETTING MEDICATIONS?: NO

## 2025-06-02 ASSESSMENT — ENCOUNTER SYMPTOMS
FEVER: 0
CONSTIPATION: 0
ABDOMINAL PAIN: 1
COUGH: 1
WEAKNESS: 1
BLOOD IN STOOL: 0
DIAPHORESIS: 1
SHORTNESS OF BREATH: 1
VOMITING: 1
WHEEZING: 0
DIARRHEA: 1
CHILLS: 1
LOSS OF CONSCIOUSNESS: 0
NAUSEA: 1
HEMOPTYSIS: 0

## 2025-06-02 ASSESSMENT — COGNITIVE AND FUNCTIONAL STATUS - GENERAL
CLIMB 3 TO 5 STEPS WITH RAILING: A LITTLE
MOBILITY SCORE: 23
SUGGESTED CMS G CODE MODIFIER DAILY ACTIVITY: CH
SUGGESTED CMS G CODE MODIFIER MOBILITY: CI
DAILY ACTIVITIY SCORE: 24

## 2025-06-02 ASSESSMENT — LIFESTYLE VARIABLES
HAVE YOU EVER FELT YOU SHOULD CUT DOWN ON YOUR DRINKING: NO
AVERAGE NUMBER OF DAYS PER WEEK YOU HAVE A DRINK CONTAINING ALCOHOL: 1
CONSUMPTION TOTAL: NEGATIVE
HAVE PEOPLE ANNOYED YOU BY CRITICIZING YOUR DRINKING: NO
HOW MANY TIMES IN THE PAST YEAR HAVE YOU HAD 5 OR MORE DRINKS IN A DAY: 0
TOTAL SCORE: 0
TOTAL SCORE: 0
EVER HAD A DRINK FIRST THING IN THE MORNING TO STEADY YOUR NERVES TO GET RID OF A HANGOVER: NO
ALCOHOL_USE: YES
SUBSTANCE_ABUSE: 1
EVER FELT BAD OR GUILTY ABOUT YOUR DRINKING: NO
DOES PATIENT WANT TO STOP DRINKING: NO
TOTAL SCORE: 0
ON A TYPICAL DAY WHEN YOU DRINK ALCOHOL HOW MANY DRINKS DO YOU HAVE: 2

## 2025-06-02 ASSESSMENT — SOCIAL DETERMINANTS OF HEALTH (SDOH)
WITHIN THE LAST YEAR, HAVE YOU BEEN HUMILIATED OR EMOTIONALLY ABUSED IN OTHER WAYS BY YOUR PARTNER OR EX-PARTNER?: NO
WITHIN THE PAST 12 MONTHS, YOU WORRIED THAT YOUR FOOD WOULD RUN OUT BEFORE YOU GOT THE MONEY TO BUY MORE: NEVER TRUE
WITHIN THE PAST 12 MONTHS, THE FOOD YOU BOUGHT JUST DIDN'T LAST AND YOU DIDN'T HAVE MONEY TO GET MORE: NEVER TRUE
WITHIN THE LAST YEAR, HAVE TO BEEN RAPED OR FORCED TO HAVE ANY KIND OF SEXUAL ACTIVITY BY YOUR PARTNER OR EX-PARTNER?: NO
WITHIN THE LAST YEAR, HAVE YOU BEEN KICKED, HIT, SLAPPED, OR OTHERWISE PHYSICALLY HURT BY YOUR PARTNER OR EX-PARTNER?: NO
IN THE PAST 12 MONTHS, HAS THE ELECTRIC, GAS, OIL, OR WATER COMPANY THREATENED TO SHUT OFF SERVICE IN YOUR HOME?: NO
WITHIN THE LAST YEAR, HAVE YOU BEEN AFRAID OF YOUR PARTNER OR EX-PARTNER?: NO

## 2025-06-02 ASSESSMENT — PATIENT HEALTH QUESTIONNAIRE - PHQ9
2. FEELING DOWN, DEPRESSED, IRRITABLE, OR HOPELESS: NEARLY EVERY DAY
1. LITTLE INTEREST OR PLEASURE IN DOING THINGS: NOT AT ALL
5. POOR APPETITE OR OVEREATING: NOT AT ALL
9. THOUGHTS THAT YOU WOULD BE BETTER OFF DEAD, OR OF HURTING YOURSELF: MORE THAN HALF THE DAYS
SUM OF ALL RESPONSES TO PHQ QUESTIONS 1-9: 9
6. FEELING BAD ABOUT YOURSELF - OR THAT YOU ARE A FAILURE OR HAVE LET YOURSELF OR YOUR FAMILY DOWN: SEVERAL DAYS
4. FEELING TIRED OR HAVING LITTLE ENERGY: NOT AT ALL
SUM OF ALL RESPONSES TO PHQ9 QUESTIONS 1 AND 2: 3
7. TROUBLE CONCENTRATING ON THINGS, SUCH AS READING THE NEWSPAPER OR WATCHING TELEVISION: NOT AT ALL
8. MOVING OR SPEAKING SO SLOWLY THAT OTHER PEOPLE COULD HAVE NOTICED. OR THE OPPOSITE, BEING SO FIGETY OR RESTLESS THAT YOU HAVE BEEN MOVING AROUND A LOT MORE THAN USUAL: NOT AT ALL
3. TROUBLE FALLING OR STAYING ASLEEP OR SLEEPING TOO MUCH: NEARLY EVERY DAY

## 2025-06-02 ASSESSMENT — FIBROSIS 4 INDEX
FIB4 SCORE: 1.37
FIB4 SCORE: 1.37

## 2025-06-02 ASSESSMENT — PAIN DESCRIPTION - PAIN TYPE: TYPE: ACUTE PAIN

## 2025-06-02 NOTE — ED PROVIDER NOTES
ER Provider Note    Scribed for Pedro Nath M.D. by Agusto Irizarry. 6/2/2025  1:25 PM    Primary Care Provider: Ivania Prince P.A.-C.    CHIEF COMPLAINT   Chief Complaint   Patient presents with    Abdominal Pain    N/V     EXTERNAL RECORDS REVIEWED  Reviewed previous ED visit    HPI/ROS  LIMITATION TO HISTORY   Select: : None  OUTSIDE HISTORIAN(S):  Significant other Patient's  at bedside to provide additional history.     Caridad Nino is a 44 y.o. female who presents to the ED via EMS for evaluation of acute abdominal pain, onset 8 AM this morning. The patient reports upper abdominal pain, more left than right. The  reports the patient started vomiting this morning and then developed the abdominal pain shortly after. He states this pain intensified, prompting them to call EMS. The patient reports the pain has been constant since onset. She states she has been on Ozempic for the last year and a half. She also reports hot flashes with the pain. She reports a medical history of diabetes and a surgical history of cholecystectomy. She reports a known medication allergy to amoxicillin.     PAST MEDICAL HISTORY  Past Medical History[1]    SURGICAL HISTORY  Past Surgical History[2]    FAMILY HISTORY  Family History   Problem Relation Age of Onset    Diabetes Mother     Other Mother         MS    Cancer Mother         colon    Cancer Sister         throat    Alcohol abuse Paternal Grandmother        SOCIAL HISTORY   reports that she quit smoking about 9 years ago. Her smoking use included cigarettes. She started smoking about 36 years ago. She has a 27.3 pack-year smoking history. She has never used smokeless tobacco. She reports current alcohol use. She reports current drug use. Drugs: Marijuana, Inhaled, and Oral.    CURRENT MEDICATIONS  Previous Medications    ALBUTEROL 108 (90 BASE) MCG/ACT AERO SOLN INHALATION AEROSOL    INHALE 2 PUFFS EVERY FOUR HOURS AS NEEDED FOR SHORTNESS OF BREATH FOR  UP TO 14 DAYS.    CONTINUOUS BLOOD GLUC SENSOR (FREESTYLE MAXWELL 14 DAY SENSOR) MISC    Apply one sensor every 14 days    CYCLOBENZAPRINE (FLEXERIL) 10 MG TAB    Take 0.5-1 Tablets by mouth 3 times a day as needed for Muscle Spasms or Moderate Pain.    METFORMIN (GLUCOPHAGE) 500 MG TAB    Take 1 Tablet by mouth 2 times a day with meals.    ONDANSETRON (ZOFRAN ODT) 4 MG TABLET DISPERSIBLE    Take 1 Tablet by mouth every 6 hours as needed for Nausea/Vomiting.    ROSUVASTATIN (CRESTOR) 5 MG TAB    TAKE ONE TABLET BY MOUTH IN THE EVENING    SEMAGLUTIDE, 2 MG/DOSE, 8 MG/3ML SOLUTION PEN-INJECTOR    Inject 2mg under the skin every seven days and repeat weekly.    SERTRALINE (ZOLOFT) 50 MG TAB    Take 1 Tablet by mouth every day.    TRIAMCINOLONE ACETONIDE (KENALOG) 0.1 % CREAM    Apply a pea-sized amount to the back of the scalp twice daily for 10 to 14 days       ALLERGIES  Amoxicillin    PHYSICAL EXAM  BP (!) 162/113   Pulse 82   Temp 35.9 °C (96.6 °F) (Temporal)   Resp (!) 37   SpO2 100%   Constitutional: Awake alert bent over the railing moaning.  Moderate distress.  HENT: Normocephalic, Atraumatic, Bilateral external ears normal, Oropharynx dry.  Eyes: PERRL, EOMI, Conjunctiva normal, No discharge.   Neck: Normal range of motion,   Cardiovascular: Normal heart rate, Normal rhythm, No murmurs, No rubs, No gallops.   Thorax & Lungs: Normal breath sounds, No respiratory distress, No wheezing,  Abdomen: Soft, tender epigastric left lower quadrant no peritonitis no right upper quadrant tenderness.  Skin: Warm, Dry, No erythema, No rash.   Back: No tenderness, No CVA tenderness.   Musculoskeletal: Good range of motion in all major joints.  Good pulses in both extremities.  Neurologic: Alert,, No focal deficits noted.       DIAGNOSTIC STUDIES    EKG/LABS  Results for orders placed or performed during the hospital encounter of 06/02/25   CBC WITH DIFFERENTIAL    Collection Time: 06/02/25 12:58 PM   Result Value Ref Range     WBC 15.6 (H) 4.8 - 10.8 K/uL    RBC 5.05 4.20 - 5.40 M/uL    Hemoglobin 14.8 12.0 - 16.0 g/dL    Hematocrit 44.6 37.0 - 47.0 %    MCV 88.3 81.4 - 97.8 fL    MCH 29.3 27.0 - 33.0 pg    MCHC 33.2 32.2 - 35.5 g/dL    RDW 45.5 35.9 - 50.0 fL    Platelet Count 185 164 - 446 K/uL    MPV 11.6 9.0 - 12.9 fL    Neutrophils-Polys 85.20 (H) 44.00 - 72.00 %    Lymphocytes 8.60 (L) 22.00 - 41.00 %    Monocytes 5.10 0.00 - 13.40 %    Eosinophils 0.10 0.00 - 6.90 %    Basophils 0.30 0.00 - 1.80 %    Immature Granulocytes 0.70 0.00 - 0.90 %    Nucleated RBC 0.00 0.00 - 0.20 /100 WBC    Neutrophils (Absolute) 13.27 (H) 1.82 - 7.42 K/uL    Lymphs (Absolute) 1.34 1.00 - 4.80 K/uL    Monos (Absolute) 0.80 0.00 - 0.85 K/uL    Eos (Absolute) 0.02 0.00 - 0.51 K/uL    Baso (Absolute) 0.04 0.00 - 0.12 K/uL    Immature Granulocytes (abs) 0.11 0.00 - 0.11 K/uL    NRBC (Absolute) 0.00 K/uL   COMP METABOLIC PANEL    Collection Time: 06/02/25 12:58 PM   Result Value Ref Range    Sodium 141 135 - 145 mmol/L    Potassium 3.9 3.6 - 5.5 mmol/L    Chloride 105 96 - 112 mmol/L    Co2 14 (L) 20 - 33 mmol/L    Anion Gap 22.0 (H) 7.0 - 16.0    Glucose 197 (H) 65 - 99 mg/dL    Bun 18 8 - 22 mg/dL    Creatinine 0.77 0.50 - 1.40 mg/dL    Calcium 9.8 8.5 - 10.5 mg/dL    Correct Calcium 9.4 8.5 - 10.5 mg/dL    AST(SGOT) 22 12 - 45 U/L    ALT(SGPT) 16 2 - 50 U/L    Alkaline Phosphatase 78 30 - 99 U/L    Total Bilirubin 0.6 0.1 - 1.5 mg/dL    Albumin 4.5 3.2 - 4.9 g/dL    Total Protein 7.6 6.0 - 8.2 g/dL    Globulin 3.1 1.9 - 3.5 g/dL    A-G Ratio 1.5 g/dL   LIPASE    Collection Time: 06/02/25 12:58 PM   Result Value Ref Range    Lipase 28 11 - 82 U/L   HCG QUAL SERUM    Collection Time: 06/02/25 12:58 PM   Result Value Ref Range    Beta-Hcg Qualitative Serum Negative Negative   ESTIMATED GFR    Collection Time: 06/02/25 12:58 PM   Result Value Ref Range    GFR (CKD-EPI) 96 >60 mL/min/1.73 m 2   TROPONIN    Collection Time: 06/02/25 12:58 PM   Result Value  Ref Range    Troponin T 8 6 - 19 ng/L   LACTIC ACID    Collection Time: 25  4:33 PM   Result Value Ref Range    Lactic Acid 2.3 (H) 0.5 - 2.0 mmol/L   URINALYSIS,CULTURE IF INDICATED    Collection Time: 25  5:28 PM    Specimen: Urine   Result Value Ref Range    Color Yellow     Character Clear     Specific Gravity >1.045 <1.035    Ph 7.0 5.0 - 8.0    Glucose 250 (A) Negative mg/dL    Ketones 15 (A) Negative mg/dL    Protein 30 (A) Negative mg/dL    Bilirubin Negative Negative    Urobilinogen, Urine 0.2 <=1.0 EU/dL    Nitrite Negative Negative    Leukocyte Esterase Negative Negative    Occult Blood Negative Negative    Micro Urine Req Microscopic    URINE MICROSCOPIC (W/UA)    Collection Time: 25  5:28 PM   Result Value Ref Range    WBC 0-2 /hpf    RBC 0-2 0 - 2 /hpf    Bacteria None Seen None /hpf    Epithelial Cells 0-2 0 - 5 /hpf    Urine Casts 0-2 0 - 2 /lpf   EKG (NOW)    Collection Time: 25  5:48 PM   Result Value Ref Range    Report       [facility] Emergency Dept.    Test Date:  2025  Pt Name:    PATRICE VENTURA              Department: ER  MRN:        [id number]                      Room:       [facility]  Gender:     Female                       Technician: [id number]  :        1980                   Requested By:BELLO PALM  Order #:    [id number]                    Reading MD: BELLO PALM. AMD    Measurements  Intervals                                Axis  Rate:       100                          P:          59  WA:         211                          QRS:        59  QRSD:       97                           T:          23  QT:         350  QTc:        452    Interpretive Statements  Sinus tachycardia  Prolonged WA interval  Compared to ECG 2017 13:17:41  First degree AV block now present  Electronically Signed On 2025 17:48:15 PDT by BELLO PALM. AMD     BASIC METABOLIC PANEL    Collection Time: 25  6:00 PM   Result Value Ref  Range    Sodium 143 135 - 145 mmol/L    Potassium 3.4 (L) 3.6 - 5.5 mmol/L    Chloride 115 (H) 96 - 112 mmol/L    Co2 16 (L) 20 - 33 mmol/L    Glucose 95 65 - 99 mg/dL    Bun 15 8 - 22 mg/dL    Creatinine 0.56 0.50 - 1.40 mg/dL    Calcium 7.3 (L) 8.5 - 10.5 mg/dL    Anion Gap 12.0 7.0 - 16.0   ESTIMATED GFR    Collection Time: 06/02/25  6:00 PM   Result Value Ref Range    GFR (CKD-EPI) 113 >60 mL/min/1.73 m 2     *Note: Due to a large number of results and/or encounters for the requested time period, some results have not been displayed. A complete set of results can be found in Results Review.       I have independently interpreted this EKG as shown above.     RADIOLOGY/PROCEDURES   The attending emergency physician has independently interpreted the diagnostic imaging associated with this visit and am waiting the final reading from the radiologist.   My preliminary interpretation is a follows: Reviewed images agree with radiology results.    Radiologist interpretation:  CT-ABDOMEN-PELVIS WITH   Final Result      1.  Circumferential colonic wall thickening involving the ascending and transverse colon, compatible with colitis.   2.  No bowel obstruction.   3.  Small sliding-type hiatal hernia.   4.  Aortic and iliac arteriosclerosis.   5.  Absent gallbladder.   6.  Left parapelvic cysts, requiring no further imaging follow-up.         DX-CHEST-PORTABLE (1 VIEW)   Final Result      No acute cardiopulmonary abnormality.          COURSE & MEDICAL DECISION MAKING     ASSESSMENT, COURSE AND PLAN  Care Narrative:   Hydration: Based on the patient's presentation of Abnormal Fluid Loss the patient was given IV fluids. IV Hydration was used because oral hydration was not adequate alone. Upon recheck following hydration, the patient was improved.      1:25 PM - Patient seen and examined at bedside. Patient presents following the acute onset of upper abdominal pain this morning at 8 AM. She reports the pain is worse on the left  compared to right, and has associated nausea and vomiting. Discussed plan of care, including lab work, diagnostic imaging, and medication for pain. Patient agrees to the plan of care. The patient will be medicated with Zofran 4 mg injection and Dilaudid 1 mg injection. Ordered for EKG, UA, HCG Qual Serum, Lipase, CMP, CBC w/ diff, Troponin, CT-Abdomen-Pelvis, and DX-Chest to evaluate her symptoms. Differential diagnoses include but not limited to: Bowel obstruction, ischemia, pancreatitis, gastroenteritis, dehydration, UTI, pregnancy,    Patient return for labs and imaging.  She is having significant pain and she is given IV Dilaudid.  She is required multiple doses.  She was given IV Zofran patient received antiemetics and IV fluids from EMS.    CT was obtained which look suspicious for colitis.  Colitis does remain in the differential diagnosis.  I became a little more concerned with ischemic bowel because of her low CO2.  Lactic acid is only minimally elevated.  I would repeat metabolic panel.  Unsure if her low CO2 was wedged or another process or from ischemia so repeat her metabolic panel she still acidotic although slightly improved.  The patient does report she was having a lot of diarrhea today.  This could certainly cause her low CO2.  She does not have peritonitis.  Think ischemia is unlikely.  Does not have a cardiac arrhythmia.    The patient restarted on IV fluids.  Patient still having persistent pain she is given some additional pain meds and antiemetics.  And to require hospitalist for continued pain control further workup and treatment.  I spoke with the resident Dr. Marin who is seen the patient.    It has become apparent the patient may have euglycemic DKA related to her Ozempic use.  I have added a beta hydroxybutyrate acid and the patient is receiving IV fluid resuscitation.  Patient will be hospi further workup and treatment presents will follow-up in the bed hydroxybutyric acid make  appropriate change in therapy.  Patient treated with fiber fluids    Hydroxybutyric acid is 0.16.  No DKA.  Should be treated with fluids and further workup and treatment.        ADDITIONAL PROBLEM LIST  Diabetes, on Ozempic.    DISPOSITION AND DISCUSSIONS  I have discussed management of the patient with the following physicians and SHARIF's: Dr. Herron from the resident service.    FINAL DIAGNOSIS  1. Epigastric pain    2. Nausea and vomiting, unspecified vomiting type    3. Colitis    4. Diarrhea, unspecified type        I, Agusto Irizarry (Donaldo), am scribing for, and in the presence of, Pedro Nath M.D..    Electronically signed by: Agusto Irizarry (Joannaibjose angel), 6/2/2025    IPedro M.D. personally performed the services described in this documentation, as scribed by Agusto Irizarry in my presence, and it is both accurate and complete.       The note accurately reflects work and decisions made by me.  Pedro Nath M.D.  6/2/2025  7:50 PM         [1]   Past Medical History:  Diagnosis Date    Cervical radiculopathy 1/18/2022    Dental disorder     Hemorrhoids 1998    History of shingles June/July 2016    Hypertension 1997    JUST DURING PREGNANCY    Hypertension due to endocrine disorder 8/22/2019    Mixed hyperlipidemia 10/10/2022    Morbid obesity due to excess calories (HCC) 12/3/2016    Normocytic anemia 8/15/2017    Pneumonia     Snoring     Thrombocytopenia (HCC) 8/14/2017    Thyroid nodule     Type 2 diabetes mellitus without complication, without long-term current use of insulin (HCC)     Vertigo 8/14/2017   [2]   Past Surgical History:  Procedure Laterality Date    CHOLECYSTECTOMY  1998

## 2025-06-02 NOTE — ED TRIAGE NOTES
Pt BIB [facility] with c/c of generalized abd pain. Pt given 4mg of zofran and 100mcg of fentanyl PTA. Pt arrives here moaning, restless on gurney complaining of pain.

## 2025-06-03 LAB
ALBUMIN SERPL BCP-MCNC: 3.9 G/DL (ref 3.2–4.9)
ALBUMIN/GLOB SERPL: 1.3 G/DL
ALP SERPL-CCNC: 69 U/L (ref 30–99)
ALT SERPL-CCNC: 12 U/L (ref 2–50)
ANION GAP SERPL CALC-SCNC: 11 MMOL/L (ref 7–16)
AST SERPL-CCNC: 19 U/L (ref 12–45)
BASOPHILS # BLD AUTO: 0.2 % (ref 0–1.8)
BASOPHILS # BLD: 0.02 K/UL (ref 0–0.12)
BILIRUB SERPL-MCNC: 0.4 MG/DL (ref 0.1–1.5)
BUN SERPL-MCNC: 16 MG/DL (ref 8–22)
CALCIUM ALBUM COR SERPL-MCNC: 9.2 MG/DL (ref 8.5–10.5)
CALCIUM SERPL-MCNC: 9.1 MG/DL (ref 8.5–10.5)
CHLORIDE SERPL-SCNC: 107 MMOL/L (ref 96–112)
CO2 SERPL-SCNC: 20 MMOL/L (ref 20–33)
CREAT SERPL-MCNC: 0.66 MG/DL (ref 0.5–1.4)
EOSINOPHIL # BLD AUTO: 0 K/UL (ref 0–0.51)
EOSINOPHIL NFR BLD: 0 % (ref 0–6.9)
ERYTHROCYTE [DISTWIDTH] IN BLOOD BY AUTOMATED COUNT: 48.1 FL (ref 35.9–50)
EST. AVERAGE GLUCOSE BLD GHB EST-MCNC: 128 MG/DL
GFR SERPLBLD CREATININE-BSD FMLA CKD-EPI: 109 ML/MIN/1.73 M 2
GLOBULIN SER CALC-MCNC: 3.1 G/DL (ref 1.9–3.5)
GLUCOSE BLD STRIP.AUTO-MCNC: 105 MG/DL (ref 65–99)
GLUCOSE BLD STRIP.AUTO-MCNC: 89 MG/DL (ref 65–99)
GLUCOSE BLD STRIP.AUTO-MCNC: 91 MG/DL (ref 65–99)
GLUCOSE BLD STRIP.AUTO-MCNC: 95 MG/DL (ref 65–99)
GLUCOSE SERPL-MCNC: 97 MG/DL (ref 65–99)
HBA1C MFR BLD: 6.1 % (ref 4–5.6)
HCT VFR BLD AUTO: 41 % (ref 37–47)
HGB BLD-MCNC: 13 G/DL (ref 12–16)
IMM GRANULOCYTES # BLD AUTO: 0.04 K/UL (ref 0–0.11)
IMM GRANULOCYTES NFR BLD AUTO: 0.3 % (ref 0–0.9)
LACTATE SERPL-SCNC: 0.7 MMOL/L (ref 0.5–2)
LACTATE SERPL-SCNC: 0.8 MMOL/L (ref 0.5–2)
LACTATE SERPL-SCNC: 1 MMOL/L (ref 0.5–2)
LYMPHOCYTES # BLD AUTO: 2.09 K/UL (ref 1–4.8)
LYMPHOCYTES NFR BLD: 16.4 % (ref 22–41)
MAGNESIUM SERPL-MCNC: 1.9 MG/DL (ref 1.5–2.5)
MCH RBC QN AUTO: 28.7 PG (ref 27–33)
MCHC RBC AUTO-ENTMCNC: 31.7 G/DL (ref 32.2–35.5)
MCV RBC AUTO: 90.5 FL (ref 81.4–97.8)
MONOCYTES # BLD AUTO: 0.88 K/UL (ref 0–0.85)
MONOCYTES NFR BLD AUTO: 6.9 % (ref 0–13.4)
NEUTROPHILS # BLD AUTO: 9.69 K/UL (ref 1.82–7.42)
NEUTROPHILS NFR BLD: 76.2 % (ref 44–72)
NRBC # BLD AUTO: 0 K/UL
NRBC BLD-RTO: 0 /100 WBC (ref 0–0.2)
PLATELET # BLD AUTO: 155 K/UL (ref 164–446)
PMV BLD AUTO: 11.3 FL (ref 9–12.9)
POTASSIUM SERPL-SCNC: 4.1 MMOL/L (ref 3.6–5.5)
PROT SERPL-MCNC: 7 G/DL (ref 6–8.2)
RBC # BLD AUTO: 4.53 M/UL (ref 4.2–5.4)
SODIUM SERPL-SCNC: 138 MMOL/L (ref 135–145)
WBC # BLD AUTO: 12.7 K/UL (ref 4.8–10.8)

## 2025-06-03 PROCEDURE — 770006 HCHG ROOM/CARE - MED/SURG/GYN SEMI*

## 2025-06-03 PROCEDURE — A9270 NON-COVERED ITEM OR SERVICE: HCPCS

## 2025-06-03 PROCEDURE — 85025 COMPLETE CBC W/AUTO DIFF WBC: CPT

## 2025-06-03 PROCEDURE — 82962 GLUCOSE BLOOD TEST: CPT

## 2025-06-03 PROCEDURE — 36415 COLL VENOUS BLD VENIPUNCTURE: CPT

## 2025-06-03 PROCEDURE — 99233 SBSQ HOSP IP/OBS HIGH 50: CPT | Performed by: INTERNAL MEDICINE

## 2025-06-03 PROCEDURE — 700111 HCHG RX REV CODE 636 W/ 250 OVERRIDE (IP): Mod: JZ | Performed by: INTERNAL MEDICINE

## 2025-06-03 PROCEDURE — 83735 ASSAY OF MAGNESIUM: CPT

## 2025-06-03 PROCEDURE — 87040 BLOOD CULTURE FOR BACTERIA: CPT

## 2025-06-03 PROCEDURE — 96376 TX/PRO/DX INJ SAME DRUG ADON: CPT

## 2025-06-03 PROCEDURE — 83605 ASSAY OF LACTIC ACID: CPT

## 2025-06-03 PROCEDURE — 80053 COMPREHEN METABOLIC PANEL: CPT

## 2025-06-03 PROCEDURE — 96374 THER/PROPH/DIAG INJ IV PUSH: CPT

## 2025-06-03 PROCEDURE — 99285 EMERGENCY DEPT VISIT HI MDM: CPT

## 2025-06-03 PROCEDURE — 700102 HCHG RX REV CODE 250 W/ 637 OVERRIDE(OP)

## 2025-06-03 PROCEDURE — 700111 HCHG RX REV CODE 636 W/ 250 OVERRIDE (IP): Mod: JZ

## 2025-06-03 PROCEDURE — 96375 TX/PRO/DX INJ NEW DRUG ADDON: CPT

## 2025-06-03 PROCEDURE — 83036 HEMOGLOBIN GLYCOSYLATED A1C: CPT

## 2025-06-03 RX ORDER — METOCLOPRAMIDE HYDROCHLORIDE 5 MG/ML
10 INJECTION INTRAMUSCULAR; INTRAVENOUS EVERY 6 HOURS
Status: DISCONTINUED | OUTPATIENT
Start: 2025-06-03 | End: 2025-06-04 | Stop reason: HOSPADM

## 2025-06-03 RX ADMIN — METOCLOPRAMIDE 10 MG: 5 INJECTION, SOLUTION INTRAMUSCULAR; INTRAVENOUS at 17:43

## 2025-06-03 RX ADMIN — ACETAMINOPHEN 1000 MG: 500 TABLET ORAL at 17:41

## 2025-06-03 RX ADMIN — ENOXAPARIN SODIUM 40 MG: 100 INJECTION SUBCUTANEOUS at 17:56

## 2025-06-03 RX ADMIN — ROSUVASTATIN CALCIUM 5 MG: 5 TABLET, FILM COATED ORAL at 17:41

## 2025-06-03 RX ADMIN — SERTRALINE 50 MG: 50 TABLET, FILM COATED ORAL at 17:42

## 2025-06-03 RX ADMIN — METOCLOPRAMIDE 10 MG: 5 INJECTION, SOLUTION INTRAMUSCULAR; INTRAVENOUS at 04:40

## 2025-06-03 RX ADMIN — ACETAMINOPHEN 1000 MG: 500 TABLET ORAL at 11:37

## 2025-06-03 RX ADMIN — FAMOTIDINE 20 MG: 10 INJECTION, SOLUTION INTRAVENOUS at 17:43

## 2025-06-03 RX ADMIN — ONDANSETRON 4 MG: 2 INJECTION INTRAMUSCULAR; INTRAVENOUS at 10:44

## 2025-06-03 RX ADMIN — METOCLOPRAMIDE 10 MG: 5 INJECTION, SOLUTION INTRAMUSCULAR; INTRAVENOUS at 11:38

## 2025-06-03 RX ADMIN — METRONIDAZOLE 500 MG: 500 INJECTION, SOLUTION INTRAVENOUS at 04:54

## 2025-06-03 RX ADMIN — OXYCODONE 5 MG: 5 TABLET ORAL at 04:39

## 2025-06-03 RX ADMIN — METRONIDAZOLE 500 MG: 500 INJECTION, SOLUTION INTRAVENOUS at 17:48

## 2025-06-03 ASSESSMENT — ENCOUNTER SYMPTOMS
DIARRHEA: 1
VOMITING: 1
SORE THROAT: 1
HEADACHES: 1
ABDOMINAL PAIN: 1
NAUSEA: 1

## 2025-06-03 ASSESSMENT — PAIN DESCRIPTION - PAIN TYPE
TYPE: ACUTE PAIN

## 2025-06-03 NOTE — ED NOTES
Med rec updated and complete. Allergies reviewed.  Confirmed name and date of birth.    Interviewed pt/family at bedside.    No outpatient antibiotic use in last 30 days.  Denies anticoagulant or antiplatelet medications.      Preferred Pharmacy  [facility] = [phone]    Partial dispense report available.

## 2025-06-03 NOTE — PROGRESS NOTES
jack from Lab called with critical result of po2 at 2134. Critical lab result read back to jack.   Dr. giron notified of critical lab result at 2135.  Critical lab result read back by Dr. giron.

## 2025-06-03 NOTE — ASSESSMENT & PLAN NOTE
6/3/2025  Started 6/2 AM, had been having intermittent vomiting with increasing doses of Ozempic in the past, however never this severe.  Ozempic now on max dose.  No hematemesis, no recent sick contacts.  Likely side effect of Ozempic given GI side effect profile. Could also be gastroparesis from Ozempic. Less likely marijuana hyperemesis syndrome. Unlikely pancreatitis.     - Antiemetic support  -- Reglan in case gastroparesis as side effect   - Maintenance fluids while n.p.o. until can tolerate diet  - GI PCR panel ordered  - Electrolyte repletion  -- If no improvement consider gastric emptying study

## 2025-06-03 NOTE — ASSESSMENT & PLAN NOTE
6/3/2025  Mild, lactate 2.3 in setting of 1 day of prolonged severe vomiting.  Likely due to dehydration.  Status post fluid repletion in ED    - Trend to <2  - If increasing consider ischemic bowel

## 2025-06-03 NOTE — PROGRESS NOTES
4 Eyes Skin Assessment Completed by CEASAR mccrary and CEASAR paulson.    Skin assessment is primarily focused on high risk bony prominences. Pay special attention to skin beneath and around medical devices, high risk bony prominences, skin to skin areas and areas where the patient lacks sensation to feel pain and areas where the patient previously had breakdown.     Head (Occipital):  WDL   Ears (Under Medical Devices): WDL   Nose (Under Medical Devices): WDL   Mouth:  WDL   Neck: WDL   Breast/Chest:  WDL   Shoulder Blades:  WDL   Spine:   Small bruise lower back   (R) Arm/Elbow/Hand: WDL   (L) Arm/Elbow/Hand: WDL   Abdomen: WDL   Pannus/Groin:  WDL   Sacrum/Coccyx:   WDL   (R) Ischial Tuberosity (Sit Bones):  WDL   (L) Ischial Tuberosity (Sit Bones):  WDL   (R) Leg:  WDL   (L) Leg:  WDL   (R) Heel:  WDL   (R) Foot/Toe: WDL   (L) Heel: WDL   (L) Foot/Toe:  WDL       DEVICES IN USE:   Respiratory Devices:  NA, patient on room air  Feeding Devices:  N/A   Lines & BP Monitoring Devices:  Peripheral IV    Orthopedic Devices:  N/A  Miscellaneous Devices:  N/A    PROTOCOL INTERVENTIONS:   Standard/Trauma Bed:  Already in place    WOUND PHOTOS:   N/A no wounds identified    WOUND CONSULT:   N/A, no advanced wound care needs identified

## 2025-06-03 NOTE — ED NOTES
Bedside report received from off going RN/tech: Katja, assumed care of patient.  POC discussed with patient. Call light within reach, all needs addressed at this time.       Fall risk interventions in place: Move the patient closer to the nurse's station, Patient's personal possessions are with in their safe reach, Place socks on patient, Place fall risk sign on patient's door, Give patient urinal if applicable, Keep floor surfaces clean and dry, and Accompanied to restroom (all applicable per [place] Fall risk assessment)   Continuous monitoring: Cardiac Leads, Pulse Ox, or Blood Pressure  IVF/IV medications: Not Applicable   Oxygen: How many liters 3L  Bedside sitter: Not Applicable   Isolation: Not Applicable

## 2025-06-03 NOTE — ASSESSMENT & PLAN NOTE
6/3/2025  Patient tachypneic in ER, placed on 5 L supplemental oxygen.  No evidence of pneumonia aspiration.  Likely due to severe nausea and vomiting.  Supplemental oxygen turned off while performing H&P, oxygen saturations remained greater than 95% on room air (supplemental oxygen resumed for nursing reassessment)    - Nausea control  - Wean supplemental oxygen as able  - Incentive spirometry

## 2025-06-03 NOTE — ED NOTES
2nd PIV placed, pt tolerated well. Lactic and ABG drawn. Pt given warm chicken broth as requested

## 2025-06-03 NOTE — PROGRESS NOTES
[facility] Medicine Daily Progress Note    Date of Service  6/3/2025    Chief Complaint  Caridad Nino is a 44 y.o. female admitted 6/2/2025 with   Chief Complaint   Patient presents with    Abdominal Pain    N/V         Hospital Course  Caridad Nino is a 44-year-old female past medical history of type 2 diabetes, obesity, marijuana use, hyperlipidemia who presented 6/2/2025 for severe abdominal pain and vomiting.  She reports this morning she started vomiting and has not stopped all day.  She then went on to develop pain in her left upper abdomen which feels like squeezing pain that is severe.  This never happened before.  With that she had diarrhea this morning briefly but it went away.  This is also new to her; prior to this her last normal bowel movement was yesterday.  About a month ago she reports she went to urgent care and received medication (doxycycline?)  That led to constipation for a few weeks.  Gradually her bowel movements returned.  Of note she is on Ozempic for the last year and a half.  She started having vomiting when her dose was increased to 0.5, however she is now up to 2 mg weekly.     ER course: Triage vitals afebrile, tachypneic 37, hypertensive 162/113, 100% oxygen on room air.  Labs remarkable for leukocytosis 15.6, chemistry panel with hypokalemia 3.4, anion gap acidosis bicarb 14, anion gap 22, glucose 197, lipase 28, lactate 2.3, troponin 8, hCG negative, urinalysis with 15 ketones, 30 protein but otherwise negative.  Chest x-ray nonacute.  CT abdomen pelvis with contrast significant for circumferential ascending and transverse colonic wall thickening compatible with colitis but otherwise nonacute.  EKG with mild sinus tachycardia, mildly prolonged SD interval 211.  She was given multiple doses of Dilaudid and Zofran without full alleviation of her vomiting and pain, 1 L LR bolus.  She was placed on 5 L supplemental oxygen due to her tachypnea in the setting of pain and  vomiting.  After further history obtained by ERP including that she is on Ozempic, beta-hydroxybutyrate was ordered which was negative (0.16). Blood gas later obtained was without acidosis.    Interval Problem Update  Patient was seen and examined at bedside.  I have personally reviewed and interpreted vitals, labs, and imaging.    6/3.  Afebrile.  Intermittent tachycardia, tachypnea.  Intermittent hypertension.  On room air.  High anion gap acidosis has resolved.  Denies fever, chills, chest pains, shortness of breath.  Abdominal pain is improved.  Not tolerate GI soft diet.  De-escalated back to full liquids.  Was complaining with sore throat and headache.  Started on benzocaine lozenges, GI cocktail.  Wbc 15.6 > 12.7  P 155  A1c 6.1    I have discussed this patient's plan of care and discharge plan at IDT rounds today with Case Management, Nursing, Nursing leadership, and other members of the IDT team.    Consultants/Specialty  None    Code Status  Full Code    Disposition  The patient is not medically cleared for discharge to home or a post-acute facility.  Anticipate discharge to: home with close outpatient follow-up    I have placed the appropriate orders for post-discharge needs.    Review of Systems  Review of Systems   HENT:  Positive for sore throat.    Gastrointestinal:  Positive for abdominal pain, diarrhea, nausea and vomiting.   Neurological:  Positive for headaches.        Physical Exam  Temp:  [35.9 °C (96.6 °F)-37.4 °C (99.3 °F)] 36.9 °C (98.5 °F)  Pulse:  [] 79  Resp:  [15-38] 15  BP: (103-171)/() 143/77  SpO2:  [82 %-100 %] 94 %    Physical Exam  Vitals and nursing note reviewed.   Constitutional:       Appearance: Normal appearance. She is obese. She is ill-appearing.   HENT:      Head: Normocephalic and atraumatic.      Right Ear: External ear normal.      Left Ear: External ear normal.      Nose: Nose normal.      Mouth/Throat:      Mouth: Mucous membranes are moist.      Pharynx:  Oropharynx is clear. No oropharyngeal exudate or posterior oropharyngeal erythema.   Eyes:      Extraocular Movements: Extraocular movements intact.      Conjunctiva/sclera: Conjunctivae normal.   Cardiovascular:      Rate and Rhythm: Regular rhythm. Tachycardia present.      Pulses: Normal pulses.      Heart sounds: Normal heart sounds. No murmur heard.  Pulmonary:      Effort: Pulmonary effort is normal. No respiratory distress.      Breath sounds: Normal breath sounds. No stridor. No wheezing or rales.   Abdominal:      General: Abdomen is flat. Bowel sounds are normal. There is no distension.      Palpations: Abdomen is soft. There is no mass.      Tenderness: There is abdominal tenderness.   Musculoskeletal:      Cervical back: Normal range of motion.   Skin:     General: Skin is warm.      Capillary Refill: Capillary refill takes less than 2 seconds.   Neurological:      General: No focal deficit present.      Mental Status: She is alert and oriented to person, place, and time. Mental status is at baseline.      Cranial Nerves: No cranial nerve deficit.   Psychiatric:         Mood and Affect: Mood normal.         Behavior: Behavior normal.         Fluids  No intake or output data in the 24 hours ending 06/03/25 0815     Laboratory  Recent Labs     06/02/25  1258 06/03/25  0006   WBC 15.6* 12.7*   RBC 5.05 4.53   HEMOGLOBIN 14.8 13.0   HEMATOCRIT 44.6 41.0   MCV 88.3 90.5   MCH 29.3 28.7   MCHC 33.2 31.7*   RDW 45.5 48.1   PLATELETCT 185 155*   MPV 11.6 11.3     Recent Labs     06/02/25  1258 06/02/25  1800 06/03/25  0006   SODIUM 141 143 138   POTASSIUM 3.9 3.4* 4.1   CHLORIDE 105 115* 107   CO2 14* 16* 20   GLUCOSE 197* 95 97   BUN 18 15 16   CREATININE 0.77 0.56 0.66   CALCIUM 9.8 7.3* 9.1                   Imaging  CT-ABDOMEN-PELVIS WITH   Final Result      1.  Circumferential colonic wall thickening involving the ascending and transverse colon, compatible with colitis.   2.  No bowel obstruction.   3.   Small sliding-type hiatal hernia.   4.  Aortic and iliac arteriosclerosis.   5.  Absent gallbladder.   6.  Left parapelvic cysts, requiring no further imaging follow-up.         DX-CHEST-PORTABLE (1 VIEW)   Final Result      No acute cardiopulmonary abnormality.           Assessment/Plan  * Intractable abdominal pain- (present on admission)  Assessment & Plan  6/3/2025  Presenting with 1 day severe epigastric/left upper quadrant abdominal pain in the setting of Ozempic increase to max dose.  Accompanied by intractable vomiting, small amount of diarrhea.  CT abdomen pelvis with ascending and transverse colitis and normal pancreas, negative lipase. No sick contacts, negative beta hydroxy to suggest euglycemic DKA though is accompanied by mild lactic acidosis. S/p cholecystectomy with no transaminitis/alk phos elevation or biliary dilatation on imaging to suggest retained stone. Abdominal pain, constipation, diarrhea, nausea, and vomiting, are frequent GI side effects of Ozempic which is exactly what she is experiencing and likely the etiology of her symptoms.  Differential includes infectious colitis, ischemic colitis though these seem less likely.  Is associated with leukocytosis and though this could be reactive.    - Pain management, see orders  - Hold Ozempic  - Serial abdominal exams  - Trend lactate  - If worsening abdominal pain may need to reimage, consider ischemic bowel  - Treat with Flagyl, blood cultures collected given severity of symptoms and septic picture    Diarrhea  Assessment & Plan  6/3/2025  Mild, resolved.  Possibly side effect of Ozempic.  No recent sick contacts    - Avoid bowel regimen  - GI PCR panel, stool cultures     Intractable vomiting  Assessment & Plan  6/3/2025  Started 6/2 AM, had been having intermittent vomiting with increasing doses of Ozempic in the past, however never this severe.  Ozempic now on max dose.  No hematemesis, no recent sick contacts.  Likely side effect of Ozempic  given GI side effect profile. Could also be gastroparesis from Ozempic. Less likely marijuana hyperemesis syndrome. Unlikely pancreatitis.     - Antiemetic support  -- Reglan in case gastroparesis as side effect   - Maintenance fluids while n.p.o. until can tolerate diet  - GI PCR panel ordered  - Electrolyte repletion  -- If no improvement consider gastric emptying study     Prolonged P-R interval  Assessment & Plan  6/3/2025  Mild, 211    - Optimize potassium and magnesium  - If worsens may need to discontinue sertraline    Severe sepsis (HCC)  Assessment & Plan  6/3/2025  SIRS criteria tachycardia, tachypnea, leukocytosis, lactic acidosis.  While these may all be reactive and due to dehydration, her temperature is increasing close to 100 F and she reported chills this morning.  CT with ascending and transverse bowel wall edema compatible with colitis.    - Complete sepsis fluid bundle  - Blood cultures collected  - Treat possible infectious colitis with Flagyl  - Trend lactate     Acute hypoxic respiratory failure (HCC)  Assessment & Plan  6/3/2025  Patient tachypneic in ER, placed on 5 L supplemental oxygen.  No evidence of pneumonia aspiration.  Likely due to severe nausea and vomiting.  Supplemental oxygen turned off while performing H&P, oxygen saturations remained greater than 95% on room air (supplemental oxygen resumed for nursing reassessment)    - Nausea control  - Wean supplemental oxygen as able  - Incentive spirometry    Hypokalemia  Assessment & Plan  6/3/2025  Mild, 3.4 in setting of vomiting and diarrhea    - Replete  - Check magnesium  - Trend  - Vomiting control    Lactic acidosis  Assessment & Plan  6/3/2025  Mild, lactate 2.3 in setting of 1 day of prolonged severe vomiting.  Likely due to dehydration.  Status post fluid repletion in ED    - Trend to <2  - If increasing consider ischemic bowel    Mixed hyperlipidemia- (present on admission)  Assessment & Plan  6/3/2025  Continue home  Crestor    Type 2 diabetes mellitus with hyperglycemia, without long-term current use of insulin (HCC)- (present on admission)  Assessment & Plan  6/3/2025  History of, treated with metformin and Ozempic outpatient    - Repeat A1c  - Hold metformin and Ozempic  - Sliding scale insulin while inpatient         VTE prophylaxis: Lovenox    I have performed a physical exam and reviewed and updated ROS and Plan today (6/3/2025). In review of yesterday's note (6/2/2025), there are no changes except as documented above.    Greater than 50 minutes spent prepping to see patient (e.g. review of tests) obtaining and/or reviewing separately obtained history. Performing a medically appropriate examination and/ evaluation.  Counseling and educating the patient/family/caregiver.  Ordering medications, tests, or procedures.  Referring and communicating with other health care professionals.  Documenting clinical information in EPIC.  Independently interpreting results and communicating results to patient/family/caregiver.  Care coordination.

## 2025-06-03 NOTE — PROGRESS NOTES
Received from the ED via [employer].  Alert x4 and able to let her needs known  Some pain noted to her LUQ; medicated as requested  Denies N/V at present- NPO with sips  Await stool specimen as ordered- states she been having loose stool at day at home; no new orders from MD when texted him via [employer]

## 2025-06-03 NOTE — ED NOTES
Pt transferred off unit via gurney by transport tech. SARAH at time of transfer    Patient seen and evaluated, staff consulted, chart, labs, meds reviewed. Reports no issues overnight. Meds adjusted resulted in sustained improved sleep. Patient states there are no changes in mood, energy, appetite, and sleep. No SI/HI, no hopelessness, but helplessness and guilt still pervasive. Patient also continues to contemplate her concerns about what will happen with her as her son insists on having her go someplace for her substance use.    Direction of care discussed with the patient. As patient's son has considerable safety concerns, an inpatient admission plan was developed.

## 2025-06-03 NOTE — HOSPITAL COURSE
Caridad Nino is a 44-year-old female past medical history of type 2 diabetes, obesity, marijuana use, hyperlipidemia who presented 6/2/2025 for severe abdominal pain and vomiting.  She reports this morning she started vomiting and has not stopped all day.  She then went on to develop pain in her left upper abdomen which feels like squeezing pain that is severe.  This never happened before.  With that she had diarrhea this morning briefly but it went away.  This is also new to her; prior to this her last normal bowel movement was yesterday.  About a month ago she reports she went to urgent care and received medication (doxycycline?)  That led to constipation for a few weeks.  Gradually her bowel movements returned.  Of note she is on Ozempic for the last year and a half.  She started having vomiting when her dose was increased to 0.5, however she is now up to 2 mg weekly.     ER course: Triage vitals afebrile, tachypneic 37, hypertensive 162/113, 100% oxygen on room air.  Labs remarkable for leukocytosis 15.6, chemistry panel with hypokalemia 3.4, anion gap acidosis bicarb 14, anion gap 22, glucose 197, lipase 28, lactate 2.3, troponin 8, hCG negative, urinalysis with 15 ketones, 30 protein but otherwise negative.  Chest x-ray nonacute.  CT abdomen pelvis with contrast significant for circumferential ascending and transverse colonic wall thickening compatible with colitis but otherwise nonacute.  EKG with mild sinus tachycardia, mildly prolonged ID interval 211.  She was given multiple doses of Dilaudid and Zofran without full alleviation of her vomiting and pain, 1 L LR bolus.  She was placed on 5 L supplemental oxygen due to her tachypnea in the setting of pain and vomiting.  After further history obtained by ERP including that she is on Ozempic, beta-hydroxybutyrate was ordered which was negative (0.16). Blood gas later obtained was without acidosis.    Patient was started on metronidazole for colitis.  On  presentation she did meet SIRS criteria but this is resolved.  Leukocytosis resolved.  Symptomatology improved.  Patient was able to tolerate diet.    PT/OT recommended patient going home with a walker.  Medically stable to discharge home.  Follow-up with primary care as outpatient.

## 2025-06-03 NOTE — ASSESSMENT & PLAN NOTE
6/3/2025  History of, treated with metformin and Ozempic outpatient    - Repeat A1c  - Hold metformin and Ozempic  - Sliding scale insulin while inpatient

## 2025-06-03 NOTE — ASSESSMENT & PLAN NOTE
6/3/2025  SIRS criteria tachycardia, tachypnea, leukocytosis, lactic acidosis.  While these may all be reactive and due to dehydration, her temperature is increasing close to 100 F and she reported chills this morning.  CT with ascending and transverse bowel wall edema compatible with colitis.    - Complete sepsis fluid bundle  - Blood cultures collected  - Treat possible infectious colitis with Flagyl  - Trend lactate

## 2025-06-03 NOTE — ASSESSMENT & PLAN NOTE
6/3/2025  Presenting with 1 day severe epigastric/left upper quadrant abdominal pain in the setting of Ozempic increase to max dose.  Accompanied by intractable vomiting, small amount of diarrhea.  CT abdomen pelvis with ascending and transverse colitis and normal pancreas, negative lipase. No sick contacts, negative beta hydroxy to suggest euglycemic DKA though is accompanied by mild lactic acidosis. S/p cholecystectomy with no transaminitis/alk phos elevation or biliary dilatation on imaging to suggest retained stone. Abdominal pain, constipation, diarrhea, nausea, and vomiting, are frequent GI side effects of Ozempic which is exactly what she is experiencing and likely the etiology of her symptoms.  Differential includes infectious colitis, ischemic colitis though these seem less likely.  Is associated with leukocytosis and though this could be reactive.    - Pain management, see orders  - Hold Ozempic  - Serial abdominal exams  - Trend lactate  - If worsening abdominal pain may need to reimage, consider ischemic bowel  - Treat with Flagyl, blood cultures collected given severity of symptoms and septic picture

## 2025-06-03 NOTE — ED NOTES
Pt medicated for nausea per MAR. Pt declines needing additional pain medication at this time. LONDON SMITH. Call light within reach

## 2025-06-03 NOTE — CARE PLAN
The patient is Stable - Low risk of patient condition declining or worsening    Shift Goals  Clinical Goals: pain management during this shift with medications available as needed    Progress made toward(s) clinical / shift goals: Patient alert, oriented and resting in bed with HOB. Reports throat pain and prn medication given with good relief. IV patent and IVF infusing well with no issues. Patient a little bit nauseated and scheduled medication given with good relief. Bed to lowest position and call light in reach.     Patient is not progressing towards the following goals:

## 2025-06-03 NOTE — H&P
[facility] Internal Medicine History & Physical Note    Date of Service  6/3/2025      Attending: Rosa Gutierrez M.d.  Senior Resident: Dr. Marin  Contact Number: [phone]    Primary Care Physician  Ivania Prince P.A.-C.    Consultants  None    Code Status  Full Code    Chief Complaint  Chief Complaint   Patient presents with    Abdominal Pain    N/V       History of Presenting Illness (HPI):   Caridad Nino is a 44-year-old female past medical history of type 2 diabetes, obesity, marijuana use, hyperlipidemia who presented 6/2/2025 for severe abdominal pain and vomiting.  She reports this morning she started vomiting and has not stopped all day.  She then went on to develop pain in her left upper abdomen which feels like squeezing pain that is severe.  This never happened before.  With that she had diarrhea this morning briefly but it went away.  This is also new to her; prior to this her last normal bowel movement was yesterday.  About a month ago she reports she went to urgent care and received medication (doxycycline?)  That led to constipation for a few weeks.  Gradually her bowel movements returned.  Of note she is on Ozempic for the last year and a half.  She started having vomiting when her dose was increased to 0.5, however she is now up to 2 mg weekly.    ER course: Triage vitals afebrile, tachypneic 37, hypertensive 162/113, 100% oxygen on room air.  Labs remarkable for leukocytosis 15.6, chemistry panel with hypokalemia 3.4, anion gap acidosis bicarb 14, anion gap 22, glucose 197, lipase 28, lactate 2.3, troponin 8, hCG negative, urinalysis with 15 ketones, 30 protein but otherwise negative.  Chest x-ray nonacute.  CT abdomen pelvis with contrast significant for circumferential ascending and transverse colonic wall thickening compatible with colitis but otherwise nonacute.  EKG with mild sinus tachycardia, mildly prolonged NC interval 211.  She was given multiple doses of Dilaudid and Zofran without full  alleviation of her vomiting and pain, 1 L LR bolus.  She was placed on 5 L supplemental oxygen due to her tachypnea in the setting of pain and vomiting.  After further history obtained by ERP including that she is on Ozempic, beta-hydroxybutyrate was ordered which was negative (0.16). Blood gas later obtained was without acidosis.      I discussed the plan of care with patient, family, and ERP, attending.    Review of Systems  Review of Systems   Constitutional:  Positive for chills and diaphoresis. Negative for fever.   Respiratory:  Positive for cough and shortness of breath (While vomiting). Negative for hemoptysis and wheezing.    Cardiovascular:  Positive for chest pain (With vomiting).   Gastrointestinal:  Positive for abdominal pain, diarrhea, nausea and vomiting. Negative for blood in stool, constipation and melena.   Neurological:  Positive for weakness. Negative for loss of consciousness.   Psychiatric/Behavioral:  Positive for substance abuse (marijuana).        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    Surgical History   has a past surgical history that includes cholecystectomy (1998).     Family History  family history includes Alcohol abuse in her paternal grandmother; Cancer in her mother and sister; Diabetes in her mother; Other in her mother.   Family history reviewed with patient.     Social History  Tobacco: No  Alcohol: No  Recreational drugs (illegal or prescription): Marijuana  Employment:  [employer]  Living Situation: With partner  Recent Travel: No  Primary Care Provider: Reviewed Ivania JUAREZ  Other (stressors, spirituality, exposures): No    Allergies  Allergies[1]    Medications  Prior to Admission Medications   Prescriptions Last Dose Informant Patient Reported? Taking?   Semaglutide, 2 MG/DOSE, 8 MG/3ML Solution Pen-injector 6/2/2025 Morning  No Yes   Sig: Inject 2mg under the skin every seven days and repeat weekly.   albuterol 108 (90 Base) MCG/ACT Aero Soln inhalation aerosol 6/2/2025 Morning  Yes Yes   Sig: Inhale 1-2 Puffs every four hours as needed for Shortness of Breath.   metFORMIN (GLUCOPHAGE) 500 MG Tab 6/2/2025 Morning  No Yes   Sig: Take 1 Tablet by mouth 2 times a day with meals.   rosuvastatin (CRESTOR) 5 MG Tab 6/1/2025 Evening  No Yes   Sig: TAKE ONE TABLET BY MOUTH IN THE EVENING   sertraline (ZOLOFT) 50 MG Tab 6/1/2025 Evening  No Yes   Sig: Take 1 Tablet by mouth every day.      Facility-Administered Medications: None       Physical Exam  Temp:  [35.9 °C (96.6 °F)-37.4 °C (99.3 °F)] 36.4 °C (97.5 °F)  Pulse:  [] 100  Resp:  [17-38] 17  BP: (103-171)/() 148/96  SpO2:  [82 %-100 %] 97 %  Blood Pressure: 123/76   Temperature: 35.9 °C (96.6 °F)   Pulse: 95   Respiration: 18   Pulse Oximetry: 98 %       Physical Exam  Vitals and nursing note reviewed.   Constitutional:       General: She is in acute distress.      Appearance: She is obese. She is ill-appearing and toxic-appearing. She is not diaphoretic.   HENT:      Head: Normocephalic and atraumatic.   Eyes:      General: No scleral icterus.     Extraocular Movements: Extraocular movements intact.   Cardiovascular:      Rate and Rhythm: Regular rhythm. Tachycardia present.      Heart sounds: No murmur heard.  Pulmonary:      Effort: Pulmonary effort is normal. No respiratory distress.      Breath sounds: Normal breath sounds. No wheezing or rales.      Comments: Tachypneic while vomiting  Abdominal:       "General: Abdomen is flat. There is no distension.      Palpations: Abdomen is soft. There is no mass.      Tenderness: There is abdominal tenderness (Mild, left upper quadrant/epigastrium). There is no guarding or rebound.      Comments: Hypoactive bowel sounds   Musculoskeletal:      Right lower leg: No edema.      Left lower leg: No edema.   Skin:     General: Skin is warm and dry.      Coloration: Skin is not jaundiced or pale.      Findings: No erythema.   Neurological:      Mental Status: She is alert and oriented to person, place, and time.   Psychiatric:         Mood and Affect: Mood normal.         Behavior: Behavior normal.         Laboratory:  Recent Labs     06/02/25  1258   WBC 15.6*   RBC 5.05   HEMOGLOBIN 14.8   HEMATOCRIT 44.6   MCV 88.3   MCH 29.3   MCHC 33.2   RDW 45.5   PLATELETCT 185   MPV 11.6     Recent Labs     06/02/25  1258 06/02/25  1800   SODIUM 141 143   POTASSIUM 3.9 3.4*   CHLORIDE 105 115*   CO2 14* 16*   GLUCOSE 197* 95   BUN 18 15   CREATININE 0.77 0.56   CALCIUM 9.8 7.3*     Recent Labs     06/02/25  1258 06/02/25  1800   ALTSGPT 16  --    ASTSGOT 22  --    ALKPHOSPHAT 78  --    TBILIRUBIN 0.6  --    LIPASE 28  --    GLUCOSE 197* 95         No results for input(s): \"NTPROBNP\" in the last 72 hours.      Recent Labs     06/02/25  1258   TROPONINT 8       Imaging:  CT-ABDOMEN-PELVIS WITH   Final Result      1.  Circumferential colonic wall thickening involving the ascending and transverse colon, compatible with colitis.   2.  No bowel obstruction.   3.  Small sliding-type hiatal hernia.   4.  Aortic and iliac arteriosclerosis.   5.  Absent gallbladder.   6.  Left parapelvic cysts, requiring no further imaging follow-up.         DX-CHEST-PORTABLE (1 VIEW)   Final Result      No acute cardiopulmonary abnormality.          Results for orders placed or performed during the hospital encounter of 06/02/25   EKG (NOW)   Result Value Ref Range    Report       [facility] " Emergency Dept.    Test Date:  2025  Pt Name:    PATRICE VENTURA              Department: ER  MRN:        [id number]                      Room:       RD 05  Gender:     Female                       Technician: [id number]  :        1980                   Requested By:BELLO PALM  Order #:    [id number]                    Reading MD: BELLO PALM. AMD    Measurements  Intervals                                Axis  Rate:       100                          P:          59  MS:         211                          QRS:        59  QRSD:       97                           T:          23  QT:         350  QTc:        452    Interpretive Statements  Sinus tachycardia  Prolonged MS interval  Compared to ECG 2017 13:17:41  First degree AV block now present  Electronically Signed On 2025 17:48:15 PDT by BELLO PALM. AMD          Assessment/Plan:    I anticipate this patient will require at least two midnights for appropriate medical management, necessitating inpatient admission because IV pain medications, further workup of abdominal pain and diarrhea and vomiting    Patient will need a Med/Surg bed on MEDICAL service .  The need is secondary to need for iv pain meds, oximetry.    * Intractable abdominal pain- (present on admission)  Assessment & Plan  Presenting with 1 day severe epigastric/left upper quadrant abdominal pain in the setting of Ozempic increase to max dose.  Accompanied by intractable vomiting, small amount of diarrhea.  CT abdomen pelvis with ascending and transverse colitis and normal pancreas, negative lipase. No sick contacts, negative beta hydroxy to suggest euglycemic DKA though is accompanied by mild lactic acidosis. S/p cholecystectomy with no transaminitis/alk phos elevation or biliary dilatation on imaging to suggest retained stone. Abdominal pain, constipation, diarrhea, nausea, and vomiting, are frequent GI side effects of Ozempic which is exactly what she is experiencing  and likely the etiology of her symptoms.  Differential includes infectious colitis, ischemic colitis though these seem less likely.  Is associated with leukocytosis and though this could be reactive.    - Pain management, see orders  - Hold Ozempic  - Serial abdominal exams  - Trend lactate  - If worsening abdominal pain may need to reimage, consider ischemic bowel  - Treat with Flagyl, blood cultures collected given severity of symptoms and septic picture    Intractable vomiting  Assessment & Plan  Started 6/2 AM, had been having intermittent vomiting with increasing doses of Ozempic in the past, however never this severe.  Ozempic now on max dose.  No hematemesis, no recent sick contacts.  Likely side effect of Ozempic given GI side effect profile. Could also be gastroparesis from Ozempic. Less likely marijuana hyperemesis syndrome. Unlikely pancreatitis.     - Antiemetic support  -- Reglan in case gastroparesis as side effect   - Maintenance fluids while n.p.o. until can tolerate diet  - GI PCR panel ordered  - Electrolyte repletion  -- If no improvement consider gastric emptying study     Severe sepsis (HCC)  Assessment & Plan  SIRS criteria tachycardia, tachypnea, leukocytosis, lactic acidosis.  While these may all be reactive and due to dehydration, her temperature is increasing close to 100 F and she reported chills this morning.  CT with ascending and transverse bowel wall edema compatible with colitis.    - Complete sepsis fluid bundle  - Blood cultures collected  - Treat possible infectious colitis with Flagyl  - Trend lactate     Diarrhea  Assessment & Plan  Mild, resolved.  Possibly side effect of Ozempic.  No recent sick contacts    - Avoid bowel regimen  - GI PCR panel, stool cultures     Prolonged P-R interval  Assessment & Plan  Mild, 211    - Optimize potassium and magnesium  - If worsens may need to discontinue sertraline    Acute hypoxic respiratory failure (HCC)  Assessment & Plan  Patient  tachypneic in ER, placed on 5 L supplemental oxygen.  No evidence of pneumonia aspiration.  Likely due to severe nausea and vomiting.  Supplemental oxygen turned off while performing H&P, oxygen saturations remained greater than 95% on room air (supplemental oxygen resumed for nursing reassessment)    - Nausea control  - Wean supplemental oxygen as able  - Incentive spirometry    Hypokalemia  Assessment & Plan  Mild, 3.4 in setting of vomiting and diarrhea    - Replete  - Check magnesium  - Trend  - Vomiting control    Lactic acidosis  Assessment & Plan  Mild, lactate 2.3 in setting of 1 day of prolonged severe vomiting.  Likely due to dehydration.  Status post fluid repletion in ED    - Trend to <2  - If increasing consider ischemic bowel    Mixed hyperlipidemia- (present on admission)  Assessment & Plan  Continue home Crestor    Type 2 diabetes mellitus with hyperglycemia, without long-term current use of insulin (HCC)- (present on admission)  Assessment & Plan  History of, treated with metformin and Ozempic outpatient    - Repeat A1c  - Hold metformin and Ozempic  - Sliding scale insulin while inpatient        VTE prophylaxis: SCDs/TEDs and enoxaparin ppx         [1]   Allergies  Allergen Reactions    Amoxicillin Hives

## 2025-06-03 NOTE — ASSESSMENT & PLAN NOTE
6/3/2025  Mild, 3.4 in setting of vomiting and diarrhea    - Replete  - Check magnesium  - Trend  - Vomiting control

## 2025-06-03 NOTE — ASSESSMENT & PLAN NOTE
6/3/2025  Mild, 211    - Optimize potassium and magnesium  - If worsens may need to discontinue sertraline

## 2025-06-03 NOTE — ASSESSMENT & PLAN NOTE
6/3/2025  Mild, resolved.  Possibly side effect of Ozempic.  No recent sick contacts    - Avoid bowel regimen  - GI PCR panel, stool cultures

## 2025-06-04 ENCOUNTER — PHARMACY VISIT (OUTPATIENT)
Dept: PHARMACY | Facility: MEDICAL CENTER | Age: 45
End: 2025-06-04
Payer: COMMERCIAL

## 2025-06-04 VITALS
BODY MASS INDEX: 30.83 KG/M2 | WEIGHT: 196.87 LBS | HEART RATE: 83 BPM | OXYGEN SATURATION: 99 % | DIASTOLIC BLOOD PRESSURE: 85 MMHG | RESPIRATION RATE: 18 BRPM | TEMPERATURE: 98.7 F | SYSTOLIC BLOOD PRESSURE: 132 MMHG

## 2025-06-04 PROBLEM — E87.20 LACTIC ACIDOSIS: Status: RESOLVED | Noted: 2025-06-02 | Resolved: 2025-06-04

## 2025-06-04 PROBLEM — A41.9 SEVERE SEPSIS (HCC): Status: RESOLVED | Noted: 2025-06-02 | Resolved: 2025-06-04

## 2025-06-04 PROBLEM — R11.10 INTRACTABLE VOMITING: Status: RESOLVED | Noted: 2025-06-02 | Resolved: 2025-06-04

## 2025-06-04 PROBLEM — J96.01 ACUTE HYPOXIC RESPIRATORY FAILURE (HCC): Status: RESOLVED | Noted: 2025-06-02 | Resolved: 2025-06-04

## 2025-06-04 PROBLEM — R19.7 DIARRHEA: Status: RESOLVED | Noted: 2025-06-02 | Resolved: 2025-06-04

## 2025-06-04 PROBLEM — I44.0 PROLONGED P-R INTERVAL: Status: RESOLVED | Noted: 2025-06-02 | Resolved: 2025-06-04

## 2025-06-04 PROBLEM — R10.9 INTRACTABLE ABDOMINAL PAIN: Status: RESOLVED | Noted: 2025-06-02 | Resolved: 2025-06-04

## 2025-06-04 PROBLEM — E87.6 HYPOKALEMIA: Status: RESOLVED | Noted: 2025-06-02 | Resolved: 2025-06-04

## 2025-06-04 PROBLEM — R65.20 SEVERE SEPSIS (HCC): Status: RESOLVED | Noted: 2025-06-02 | Resolved: 2025-06-04

## 2025-06-04 LAB
ALBUMIN SERPL BCP-MCNC: 4 G/DL (ref 3.2–4.9)
ANION GAP SERPL CALC-SCNC: 11 MMOL/L (ref 7–16)
BUN SERPL-MCNC: 12 MG/DL (ref 8–22)
CALCIUM ALBUM COR SERPL-MCNC: 9.3 MG/DL (ref 8.5–10.5)
CALCIUM SERPL-MCNC: 9.3 MG/DL (ref 8.5–10.5)
CHLORIDE SERPL-SCNC: 108 MMOL/L (ref 96–112)
CO2 SERPL-SCNC: 22 MMOL/L (ref 20–33)
CREAT SERPL-MCNC: 0.69 MG/DL (ref 0.5–1.4)
ERYTHROCYTE [DISTWIDTH] IN BLOOD BY AUTOMATED COUNT: 49.1 FL (ref 35.9–50)
GFR SERPLBLD CREATININE-BSD FMLA CKD-EPI: 108 ML/MIN/1.73 M 2
GLUCOSE BLD STRIP.AUTO-MCNC: 108 MG/DL (ref 65–99)
GLUCOSE BLD STRIP.AUTO-MCNC: 86 MG/DL (ref 65–99)
GLUCOSE BLD STRIP.AUTO-MCNC: 95 MG/DL (ref 65–99)
GLUCOSE SERPL-MCNC: 98 MG/DL (ref 65–99)
HCT VFR BLD AUTO: 40 % (ref 37–47)
HGB BLD-MCNC: 12.7 G/DL (ref 12–16)
MAGNESIUM SERPL-MCNC: 1.9 MG/DL (ref 1.5–2.5)
MCH RBC QN AUTO: 29.3 PG (ref 27–33)
MCHC RBC AUTO-ENTMCNC: 31.8 G/DL (ref 32.2–35.5)
MCV RBC AUTO: 92.2 FL (ref 81.4–97.8)
PHOSPHATE SERPL-MCNC: 3.5 MG/DL (ref 2.5–4.5)
PLATELET # BLD AUTO: 142 K/UL (ref 164–446)
PMV BLD AUTO: 11.3 FL (ref 9–12.9)
POTASSIUM SERPL-SCNC: 3.7 MMOL/L (ref 3.6–5.5)
PROCALCITONIN SERPL-MCNC: 0.08 NG/ML
RBC # BLD AUTO: 4.34 M/UL (ref 4.2–5.4)
SODIUM SERPL-SCNC: 141 MMOL/L (ref 135–145)
WBC # BLD AUTO: 8.2 K/UL (ref 4.8–10.8)

## 2025-06-04 PROCEDURE — 85027 COMPLETE CBC AUTOMATED: CPT

## 2025-06-04 PROCEDURE — A9270 NON-COVERED ITEM OR SERVICE: HCPCS | Performed by: INTERNAL MEDICINE

## 2025-06-04 PROCEDURE — A9270 NON-COVERED ITEM OR SERVICE: HCPCS

## 2025-06-04 PROCEDURE — 700102 HCHG RX REV CODE 250 W/ 637 OVERRIDE(OP)

## 2025-06-04 PROCEDURE — 82962 GLUCOSE BLOOD TEST: CPT

## 2025-06-04 PROCEDURE — 700102 HCHG RX REV CODE 250 W/ 637 OVERRIDE(OP): Performed by: INTERNAL MEDICINE

## 2025-06-04 PROCEDURE — 83735 ASSAY OF MAGNESIUM: CPT

## 2025-06-04 PROCEDURE — 99239 HOSP IP/OBS DSCHRG MGMT >30: CPT | Performed by: INTERNAL MEDICINE

## 2025-06-04 PROCEDURE — 700111 HCHG RX REV CODE 636 W/ 250 OVERRIDE (IP)

## 2025-06-04 PROCEDURE — 84145 PROCALCITONIN (PCT): CPT

## 2025-06-04 PROCEDURE — 80069 RENAL FUNCTION PANEL: CPT

## 2025-06-04 PROCEDURE — 97162 PT EVAL MOD COMPLEX 30 MIN: CPT

## 2025-06-04 PROCEDURE — RXMED WILLOW AMBULATORY MEDICATION CHARGE: Performed by: INTERNAL MEDICINE

## 2025-06-04 PROCEDURE — 700111 HCHG RX REV CODE 636 W/ 250 OVERRIDE (IP): Mod: JZ | Performed by: INTERNAL MEDICINE

## 2025-06-04 RX ORDER — DEXTROSE MONOHYDRATE 25 G/50ML
25 INJECTION, SOLUTION INTRAVENOUS
Status: DISCONTINUED | OUTPATIENT
Start: 2025-06-04 | End: 2025-06-04 | Stop reason: HOSPADM

## 2025-06-04 RX ORDER — LANOLIN ALCOHOL/MO/W.PET/CERES
400 CREAM (GRAM) TOPICAL 2 TIMES DAILY
Status: DISCONTINUED | OUTPATIENT
Start: 2025-06-04 | End: 2025-06-04 | Stop reason: HOSPADM

## 2025-06-04 RX ORDER — INSULIN LISPRO 100 [IU]/ML
1-6 INJECTION, SOLUTION INTRAVENOUS; SUBCUTANEOUS
Status: DISCONTINUED | OUTPATIENT
Start: 2025-06-04 | End: 2025-06-04 | Stop reason: HOSPADM

## 2025-06-04 RX ORDER — POTASSIUM CHLORIDE 1500 MG/1
20 TABLET, EXTENDED RELEASE ORAL ONCE
Status: COMPLETED | OUTPATIENT
Start: 2025-06-04 | End: 2025-06-04

## 2025-06-04 RX ORDER — OMEPRAZOLE 20 MG/1
20 CAPSULE, DELAYED RELEASE ORAL DAILY
Qty: 30 CAPSULE | Refills: 0 | Status: SHIPPED | OUTPATIENT
Start: 2025-06-04

## 2025-06-04 RX ORDER — ONDANSETRON 4 MG/1
4 TABLET, ORALLY DISINTEGRATING ORAL EVERY 4 HOURS PRN
Qty: 10 TABLET | Refills: 0 | Status: SHIPPED | OUTPATIENT
Start: 2025-06-04

## 2025-06-04 RX ADMIN — METOCLOPRAMIDE 10 MG: 5 INJECTION, SOLUTION INTRAMUSCULAR; INTRAVENOUS at 00:04

## 2025-06-04 RX ADMIN — FAMOTIDINE 20 MG: 10 INJECTION, SOLUTION INTRAVENOUS at 05:22

## 2025-06-04 RX ADMIN — BENZOCAINE AND MENTHOL 1 LOZENGE: 15; 3.6 LOZENGE ORAL at 09:10

## 2025-06-04 RX ADMIN — METOCLOPRAMIDE 10 MG: 5 INJECTION, SOLUTION INTRAMUSCULAR; INTRAVENOUS at 05:22

## 2025-06-04 RX ADMIN — METOCLOPRAMIDE 10 MG: 5 INJECTION, SOLUTION INTRAMUSCULAR; INTRAVENOUS at 12:04

## 2025-06-04 RX ADMIN — ACETAMINOPHEN 1000 MG: 500 TABLET ORAL at 11:59

## 2025-06-04 RX ADMIN — BENZOCAINE AND MENTHOL 1 LOZENGE: 15; 3.6 LOZENGE ORAL at 14:03

## 2025-06-04 RX ADMIN — Medication 400 MG: at 05:22

## 2025-06-04 RX ADMIN — POTASSIUM CHLORIDE 20 MEQ: 1500 TABLET, EXTENDED RELEASE ORAL at 05:22

## 2025-06-04 RX ADMIN — ONDANSETRON 4 MG: 2 INJECTION INTRAMUSCULAR; INTRAVENOUS at 07:58

## 2025-06-04 RX ADMIN — METRONIDAZOLE 500 MG: 500 INJECTION, SOLUTION INTRAVENOUS at 05:22

## 2025-06-04 RX ADMIN — ACETAMINOPHEN 1000 MG: 500 TABLET ORAL at 00:04

## 2025-06-04 RX ADMIN — ACETAMINOPHEN 1000 MG: 500 TABLET ORAL at 05:21

## 2025-06-04 RX ADMIN — LIDOCAINE HYDROCHLORIDE 30 ML: 20 SOLUTION ORAL; TOPICAL at 09:12

## 2025-06-04 ASSESSMENT — COGNITIVE AND FUNCTIONAL STATUS - GENERAL
SUGGESTED CMS G CODE MODIFIER MOBILITY: CK
MOVING TO AND FROM BED TO CHAIR: A LITTLE
CLIMB 3 TO 5 STEPS WITH RAILING: A LITTLE
MOVING FROM LYING ON BACK TO SITTING ON SIDE OF FLAT BED: A LITTLE
MOBILITY SCORE: 18
TURNING FROM BACK TO SIDE WHILE IN FLAT BAD: A LITTLE
WALKING IN HOSPITAL ROOM: A LITTLE
STANDING UP FROM CHAIR USING ARMS: A LITTLE

## 2025-06-04 ASSESSMENT — PAIN DESCRIPTION - PAIN TYPE
TYPE: OTHER (COMMENT)
TYPE: ACUTE PAIN
TYPE: ACUTE PAIN

## 2025-06-04 ASSESSMENT — GAIT ASSESSMENTS
GAIT LEVEL OF ASSIST: STANDBY ASSIST
DISTANCE (FEET): 50
ASSISTIVE DEVICE: FRONT WHEEL WALKER
DEVIATION: ANTALGIC;STEP TO

## 2025-06-04 NOTE — CARE PLAN
The patient is Stable - Low risk of patient condition declining or worsening    Shift Goals  Clinical Goals: Manage pt's pain and nausea through entire shift.  Patient Goals: Pain control and nausea control  Family Goals: BERENICE    Progress made toward(s) clinical / shift goals:  Pts nausea was controlled with scheduled medications and warm fluids. Pt was able to ambulate with assistance.      Problem: Pain - Standard  Goal: Alleviation of pain or a reduction in pain to the patient’s comfort goal  Outcome: Progressing   Pain was controlled with scheduled medications    Problem: Fall Risk  Goal: Patient will remain free from falls  Outcome: Progressing     Problem: Knowledge Deficit - Standard  Goal: Patient and family/care givers will demonstrate understanding of plan of care, disease process/condition, diagnostic tests and medications  Outcome: Progressing        Patient is not progressing towards the following goals:

## 2025-06-04 NOTE — PROGRESS NOTES
Discharge orders received.  Patient arrived to the discharge lounge.  PIV x2 removed by discharge RN. Meds to beds medications verified by discharge RN, bag of medications given to patient.  Instructions given, medications reviewed and general discharge education provided to patient.  Follow up appointments discussed.  Patient verbalized understanding of dc instructions and prescriptions.  Patient signed discharge instructions.  Patient verbalized she had all belongings with her, Denied having any home medications locked in our inpatient pharmacy that  she needs back. Patient wheeled from discharge lounge to private vehicle. Patient left via car with spouse to home in stable condition.      none

## 2025-06-04 NOTE — THERAPY
Physical Therapy   Initial Evaluation     Patient Name:  Caridad Nino  Age:  44 y.o., Sex:  female  Medical Record #:  [id number]  Today's Date: 6/4/2025     Precautions  Medical: (P) Fall Risk    Assessment  Patient is 44 y.o. female with who resides in a ground level duplex with her spouse. Pt with type 2 diabetes, obesity, marijuana use, hyperlipidemia who presented 6/2/2025 for severe abdominal pain and vomiting.        Pt demonstrating the ability to ambulate with FWW for short in home distances. Pt will need a FWW for home. Pt spouse present and able to assist with steps x 2 to enter home.   No further skilled inpatient or post acute PT needs.   Plan    Physical Therapy Initial Treatment Plan   Duration: (P) Discharge Needs Only    DC Equipment Recommendations: (P) Front-Wheel Walker  Discharge Recommendations: (P) Anticipate that the patient will have no further physical therapy needs after discharge from the hospital         Initial Contact Note    Initial Contact Note Order Received and Verified, Physical Therapy Evaluation in Progress with Full Report to Follow.   Precautions   Medical Fall Risk   Prior Living Situation   Prior Services None   Housing / Facility 1 Story House   Steps Into Home 2   Steps In Home 0   Rail None   Equipment Owned None   Lives with - Patient's Self Care Capacity Spouse;Child Less than 18 Years of Age   Prior Level of Functional Mobility   Bed Mobility Independent   Transfer Status Independent   Ambulation Independent   Ambulation Distance household   Assistive Devices Used None   History of Falls   History of Falls No   Active ROM Lower Body    Active ROM Lower Body  WDL   Strength Lower Body   Lower Body Strength  WDL   Comments reports RLE weaker than LLE.   Other Treatments   Other Treatments Provided education on ascending and d   Balance Assessment   Sitting Balance (Static) Good   Sitting Balance (Dynamic) Good   Standing Balance (Static) Fair +   Standing  Balance (Dynamic) Fair   Weight Shift Sitting Good   Weight Shift Standing Fair   Comments w/FWW   Bed Mobility    Supine to Sit Independent   Sit to Supine Independent   Gait Analysis   Gait Level Of Assist Standby Assist   Assistive Device Front Wheel Walker   Distance (Feet) 50   # of Times Distance was Traveled 1   Deviation Antalgic;Step To   Comments s   Functional Mobility   Sit to Stand Supervised   Bed, Chair, Wheelchair Transfer Supervised   Toilet Transfers Supervised   6 Clicks Assessment - How much HELP from another person do you currently need... (If the patient hasn't done an activity recently, how much help from another person do you think he/she would need if he/she tried?)   Turning from your back to your side while in a flat bed without using bedrails? 3   Moving from lying on your back to sitting on the side of a flat bed without using bedrails? 3   Moving to and from a bed to a chair (including a wheelchair)? 3   Standing up from a chair using your arms (e.g., wheelchair, or bedside chair)? 3   Walking in hospital room? 3   Climbing 3-5 steps with a railing? 3   6 clicks Mobility Score 18   Education Group   Education Provided Role of Physical Therapist;Gait Training;Use of Assistive Device   Role of Physical Therapist Patient Response Patient;Acceptance;Explanation;Verbal Demonstration   Gait Training Patient Response Patient;Acceptance;Explanation;Verbal Demonstration   Use of Assistive Device Patient Response Patient;Acceptance;Explanation;Verbal Demonstration   Physical Therapy Initial Treatment Plan    Duration Discharge Needs Only   Anticipated Discharge Equipment and Recommendations   DC Equipment Recommendations Front-Wheel Walker   Discharge Recommendations Anticipate that the patient will have no further physical therapy needs after discharge from the hospital   Interdisciplinary Plan of Care Collaboration   IDT Collaboration with  Nursing   Patient Position at End of Therapy Edge of  Bed;Phone within Reach;Family / Friend in Room;Call Light within Reach   Collaboration Comments staff updated. Pt will need FWW for home use.   Session Information   Date / Session Number  6/4 DC only

## 2025-06-04 NOTE — DISCHARGE PLANNING
Case Management Discharge Planning        Action(s) Taken:     MD placed order for FWW.   RN CANDELARIO obtained choice for FWW from pt at bedside.   RN CM requested bedside RN place order with traction.

## 2025-06-05 NOTE — DOCUMENTATION QUERY
[facility]                                                                       Query Response Note      PATIENT:               PATRICE VENTURA  ACCT #:                  [id number]  MRN:                     [id number]  :                      1980  ADMIT DATE:       2025 12:48 PM  DISCH DATE:        2025 2:57 PM  RESPONDING  PROVIDER #:        [id number]           QUERY TEXT:    Patient presents with abdominal pain and N/V after Ozempic increased to maximum dose. Procalcitonin 0.08. H&P documents Abdominal pain, N/V frequent side effects of Ozempic, infectious colitis lass likely...SIRS may all be due to dehydration...Acute resp failure 5 L oxygen likely due to severe N/V, oxygen turned off while performing H&P, oxygen saturations remained greater than 95% on room air...Lactic acidosis likely due to dehydration.    The diagnosis of sepsis has been documented in the Medical Record without an associated organ dysfunction. SIRS 2/4 present on arrival with RR 37 and WBC 15.7.     Please clarify the status of sepsis.    Note: If you agree with a diagnosis listed, please remember to include it in your concurrent daily documentation and onto the Discharge Summary.    Sepsis - real or suspected infection plus 2 or more SIRS criteria + organ dysfunction related to sepsis    Temp <96.8 or >101  HR >90  RR >20  WBC <4,000 or > 12,000 or >10% bandemia    The patient's Clinical Indicators include:  Vitals on arrival: RR 37  Labs on admit: WBC 15.6 ; Lactic acid 2.3; Procalcitonin 0.08  6/3 BC x2 Negative to date - still pending    H&P: Dx Severe sepsis - Abdominal pain, N/V frequent side effects of Ozempic...Differential includes infectious colitis, ischemic colitis though these seem less likely...SIRS may all be reactive and due to dehydration...Acute resp failure  5 L supplemental oxygen Likely due to severe nausea and vomiting.  Supplemental  oxygen turned off while performing H&P, oxygen saturations remained greater than 95% on room air...Dx Lactic acidosis likely due to dehydration.    Treatment:   6/2 - IV Flagyl 500 mg x1; IV LR 1000 ml bolus x2; IV LR infusion @ 100 ml/hr  6/3 - IV Flagyl 500 mg x2  6/4 - IV Flagyl 500 mg x1    Risk factors: Abdominal pain    Thank you,  Rosa Isela Branch BSN  Clinical   Connect via [employer]  Options provided:   -- Sepsis exists -, (Please document source of infection and sepsis-related organ dysfunction)   -- Sepsis does not exist - amended documentation in the medical record and updated problem list   -- Other explanation, Please specify      Query created by: Rosa Isela Branch on 6/4/2025 9:17 AM    RESPONSE TEXT:    Sepsis does not exist - amended documentation in the medical record and updated problem list       QUERY TEXT:    Please provide additional clinical indicators supportive of the documented diagnosis of acute respiratory failure. H&P documents acute resp failure  5 L supplemental oxygen likely due to severe nausea and vomiting.  Supplemental oxygen turned off while performing H&P, oxygen saturations remained greater than 95% on room air. Treated with up to 5L O2 documented in notes and in flowsheets up to 3L O2 NC 6/2 @ 1735 until 2001, then back to RA for remainder of encounter.    Note: If you agree with a diagnosis listed, please remember to include it in your concurrent daily documentation and onto the Discharge Summary.    The patient's Clinical Indicators include:  Vitals on arrival: RR 37    H&P: Acute resp failure  5 L supplemental oxygen Likely due to severe nausea and vomiting.  Supplemental oxygen turned off while performing H&P, oxygen saturations remained greater than 95% on room air    Treatment: up to 5L O2 documented in notes; up to 3L O2 NC documented in flowsheets from 6/2 @ 1735 until 2001, then back to RA    Risk factors: Sepsis; Abdominal pain;  N/V    Thank you,  Rosa Isela Branch BSN  Clinical   Connect via [employer]  Options provided:   -- Condition of acute respiratory failure exists -, (please document additional clinical indicators)   -- Condition of acute respiratory failure does not exist and amended documentation provided in the medical record   -- Other explanation, (please specify other explanation)      Query created by: Rosa Isela Branch on 6/4/2025 9:23 AM    RESPONSE TEXT:    Condition of acute respiratory failure does not exist and amended documentation provided in the medical record          Electronically signed by:  REBEKAH MARCOS MD 6/4/2025 7:12 PM

## 2025-06-05 NOTE — DISCHARGE SUMMARY
Discharge Summary    CHIEF COMPLAINT ON ADMISSION  Chief Complaint   Patient presents with    Abdominal Pain    N/V       Reason for Admission  EMS     Admission Date  6/2/2025    CODE STATUS  FULL    HPI & HOSPITAL COURSE  Caridad Nino is a 44-year-old female past medical history of type 2 diabetes, obesity, marijuana use, hyperlipidemia who presented 6/2/2025 for severe abdominal pain and vomiting.  She reports this morning she started vomiting and has not stopped all day.  She then went on to develop pain in her left upper abdomen which feels like squeezing pain that is severe.  This never happened before.  With that she had diarrhea this morning briefly but it went away.  This is also new to her; prior to this her last normal bowel movement was yesterday.  About a month ago she reports she went to urgent care and received medication (doxycycline?)  That led to constipation for a few weeks.  Gradually her bowel movements returned.  Of note she is on Ozempic for the last year and a half.  She started having vomiting when her dose was increased to 0.5, however she is now up to 2 mg weekly.     ER course: Triage vitals afebrile, tachypneic 37, hypertensive 162/113, 100% oxygen on room air.  Labs remarkable for leukocytosis 15.6, chemistry panel with hypokalemia 3.4, anion gap acidosis bicarb 14, anion gap 22, glucose 197, lipase 28, lactate 2.3, troponin 8, hCG negative, urinalysis with 15 ketones, 30 protein but otherwise negative.  Chest x-ray nonacute.  CT abdomen pelvis with contrast significant for circumferential ascending and transverse colonic wall thickening compatible with colitis but otherwise nonacute.  EKG with mild sinus tachycardia, mildly prolonged IN interval 211.  She was given multiple doses of Dilaudid and Zofran without full alleviation of her vomiting and pain, 1 L LR bolus.  She was placed on 5 L supplemental oxygen due to her tachypnea in the setting of pain and vomiting.  After further  history obtained by ERP including that she is on Ozempic, beta-hydroxybutyrate was ordered which was negative (0.16). Blood gas later obtained was without acidosis.    Patient was started on metronidazole for colitis.  On presentation she did meet SIRS criteria but this is resolved.  Leukocytosis resolved.  Symptomatology improved.  Patient was able to tolerate diet.    PT/OT recommended patient going home with a walker.  Medically stable to discharge home.  Follow-up with primary care as outpatient.    Therefore, she is discharged in fair and stable condition to home with close outpatient follow-up.    The patient met 2-midnight criteria for an inpatient stay at the time of discharge.    Discharge Date  6/4/2025    FOLLOW UP ITEMS POST DISCHARGE  None    DISCHARGE DIAGNOSES  Principal Problem (Resolved):    Intractable abdominal pain (POA: Yes)  Active Problems:    Type 2 diabetes mellitus with hyperglycemia, without long-term current use of insulin (HCC) (POA: Yes)      Overview: 1/18/22: hbga1c 8.7 October 2022 hemoglobin A1c was 10.s      She has been on Ozempic for 1 month and is tolerating.  Can also takes       month metformin 500 mg every morning and 1000 every afternoon without       nausea or diarrhea            10/16/23: Taking metformin 500 mg twice daily.  Is not on Ozempic.        Switched pharmacies and [facility] never gave to her.  Is not currently taking       Jardiance either                Mixed hyperlipidemia (POA: Yes)      Overview: October 2023: Total cholesterol 184, triglycerides 182, HDL 31,   Resolved Problems:    Lactic acidosis (POA: Yes)    Hypokalemia (POA: Yes)    Acute hypoxic respiratory failure (HCC) (POA: Yes)    Severe sepsis (HCC) (POA: Yes)    Prolonged P-R interval (POA: Yes)    Intractable vomiting (POA: Yes)    Diarrhea (POA: Yes)      FOLLOW UP  Ivania Prince P.A.-C.  [address]  [address]  [place] [address] [phone]    Call in 1  week(s)        MEDICATIONS ON DISCHARGE     Medication List        START taking these medications        Instructions   benzocaine-menthol 15-3.6 MG Lozg  Commonly known as: Cepacol   Dissolve 1 Lozenge in the mouth every 2 hours as needed (sore throat).  Dose: 1 Lozenge     omeprazole 20 MG delayed-release capsule  Commonly known as: PriLOSEC   Take 1 Capsule by mouth every day.  Dose: 20 mg     ondansetron 4 MG Tbdp  Commonly known as: Zofran ODT   Dissolve 1 Tablet by mouth every four hours as needed for Nausea/Vomiting.  Dose: 4 mg            CONTINUE taking these medications        Instructions   albuterol 108 (90 Base) MCG/ACT Aers inhalation aerosol   Inhale 1-2 Puffs every four hours as needed for Shortness of Breath.  Dose: 1-2 Puff     metFORMIN 500 MG Tabs  Commonly known as: Glucophage   Doctor's comments: For Diagnosis of type 2 diabetes  Take 1 Tablet by mouth 2 times a day with meals.  Dose: 500 mg     rosuvastatin 5 MG Tabs  Commonly known as: Crestor   TAKE ONE TABLET BY MOUTH IN THE EVENING  Dose: 5 mg     Semaglutide (2 MG/DOSE) 8 MG/3ML Sopn   Inject 2mg under the skin every seven days and repeat weekly.     sertraline 50 MG Tabs  Commonly known as: Zoloft   Take 1 Tablet by mouth every day.  Dose: 50 mg              Allergies  Allergies[1]    DIET  No orders of the defined types were placed in this encounter.      ACTIVITY  As tolerated.  Weight bearing as tolerated    CONSULTATIONS  None    PROCEDURES  None    LABORATORY  Lab Results   Component Value Date    SODIUM 141 06/04/2025    POTASSIUM 3.7 06/04/2025    CHLORIDE 108 06/04/2025    CO2 22 06/04/2025    GLUCOSE 98 06/04/2025    BUN 12 06/04/2025    CREATININE 0.69 06/04/2025        Lab Results   Component Value Date    WBC 8.2 06/04/2025    HEMOGLOBIN 12.7 06/04/2025    HEMATOCRIT 40.0 06/04/2025    PLATELETCT 142 (L) 06/04/2025        I discussed medications and side effects with the patient.  I discussed prognosis and importance of  medical compliance with the patient.  I counseled the patient about diet, exercise, weight loss, smoking cessation, and life style modifications.  All questions and concerns have been addressed.  Total time of the discharge process was 37 minutes.         [1]   Allergies  Allergen Reactions    Amoxicillin Hives

## 2025-06-08 LAB
BACTERIA BLD CULT: NORMAL
SIGNIFICANT IND 70042: NORMAL
SITE SITE: NORMAL
SOURCE SOURCE: NORMAL

## 2025-06-09 ENCOUNTER — TELEPHONE (OUTPATIENT)
Dept: SCHEDULING | Facility: IMAGING CENTER | Age: 45
End: 2025-06-09
Payer: COMMERCIAL

## 2025-06-09 LAB
BACTERIA BLD CULT: ABNORMAL
SIGNIFICANT IND 70042: ABNORMAL
SITE SITE: ABNORMAL
SOURCE SOURCE: ABNORMAL

## 2025-06-10 ENCOUNTER — TELEPHONE (OUTPATIENT)
Dept: MEDICAL GROUP | Age: 45
End: 2025-06-10
Payer: COMMERCIAL

## 2025-06-10 NOTE — TELEPHONE ENCOUNTER
VOICEMAIL  1. Caller Name: Caridad Nino                       Call Back Number: [phone]     2. Message: Pt left a voicemail needing to make an appointment with Ivania for a hospital follow up, pt did not answer LVM with my contact info to get her scheduled.     3. Patient approves office to leave a detailed voicemail/MyChart message: N\A  
30
45

## 2025-06-12 ENCOUNTER — OFFICE VISIT (OUTPATIENT)
Dept: MEDICAL GROUP | Age: 45
End: 2025-06-12
Payer: COMMERCIAL

## 2025-06-12 VITALS
BODY MASS INDEX: 31.39 KG/M2 | OXYGEN SATURATION: 96 % | SYSTOLIC BLOOD PRESSURE: 136 MMHG | TEMPERATURE: 97.6 F | DIASTOLIC BLOOD PRESSURE: 78 MMHG | WEIGHT: 200 LBS | HEART RATE: 95 BPM | HEIGHT: 67 IN

## 2025-06-12 DIAGNOSIS — K59.00 CONSTIPATION, UNSPECIFIED CONSTIPATION TYPE: ICD-10-CM

## 2025-06-12 DIAGNOSIS — J98.8 RTI (RESPIRATORY TRACT INFECTION): ICD-10-CM

## 2025-06-12 DIAGNOSIS — K52.9 COLITIS: ICD-10-CM

## 2025-06-12 DIAGNOSIS — E11.65 TYPE 2 DIABETES MELLITUS WITH HYPERGLYCEMIA, WITHOUT LONG-TERM CURRENT USE OF INSULIN (HCC): Primary | ICD-10-CM

## 2025-06-12 RX ORDER — AZITHROMYCIN 250 MG/1
TABLET, FILM COATED ORAL
Qty: 6 TABLET | Refills: 0 | Status: SHIPPED | OUTPATIENT
Start: 2025-06-12

## 2025-06-12 ASSESSMENT — FIBROSIS 4 INDEX: FIB4 SCORE: 1.78

## 2025-06-12 NOTE — PROGRESS NOTES
Subjective:     Caridad Nino is a 44 y.o. female who presents for Hospital Follow-up.    HPI:   Recently hospitalized for colitis    History of Present Illness  The patient presents for a follow-up visit after hospitalization.    She experienced an episode of severe abdominal pain, vomiting, and diarrhea on 06/02/2025, which necessitated her 's call to 911. She was transported to the hospital via ambulance, where a CT scan revealed colitis. Despite attempts to obtain a stool sample, she was unable to provide one due to her inability to eat. Her bowel movements have been inconsistent, ranging from extremely loose to hard. She experienced a recurrence of abdominal pain yesterday, but it was not as severe as the initial episode. Since her discharge from the hospital, she has vomited four times. She has Zofran at home. She was discharged on 06/04/2025 in the afternoon around 2:30 PM. She was prescribed metronidazole during her hospital stay.    She is currently on Ozempic for weight loss but has expressed a desire to discontinue it due to concerns about potential side effects. She is considering switching to Mounjaro. She was also prescribed omeprazole, which she believes may be causing her to feel unwell.    She has applied for FMLA through her employer and has requested assistance with the necessary paperwork. She plans to return to work on 06/18/2025. 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    She reports feeling shaky in the mornings and has had cold feet since her hospital discharge, necessitating constant sock wear. She has noticed green phlegm in her chest over the past two days.    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        Current medicines (including reconciliation performed today)  Current Medications[1]    Allergies:   Amoxicillin    Social History[2]    ROS:  As per above    Objective:     Vitals:    06/12/25 1254  "  BP: 136/78   BP Location: Left arm   Patient Position: Sitting   BP Cuff Size: Adult   Pulse: 95   Temp: 36.4 °C (97.6 °F)   TempSrc: Temporal   SpO2: 96%   Weight: 90.7 kg (200 lb)   Height: 1.702 m (5' 7\")     Body mass index is 31.32 kg/m².    Physical Exam:  Gen.: Patient is A and O ×3, no acute distress, well-nourished well-hydrated  Vitals: Are listed and unremarkable  HEENT: Heads normocephalic, atraumatic, PERRLA, extraocular movements intact, TMs and oropharynx clear  Neck: Soft supple without cervical lymphadenopathy  Cardiovascular: Regular rate and rhythm normal S1 and S2. No murmurs, rubs or gallops  Lungs are clear to auscultation bilaterally. no wheezes rales or rhonchi  Abdomen is soft, nontender, nondistended with good bowel sounds, no hepatosplenomegaly  Skin: Is well perfused without evidence of rash or lesions  Neurological:  cranial nerves II through XII were assessed and intact.  Musculoskeletal: Full range of motion, 5 out of 5 strength against resistance  Neurovascularly: Intact with a 2 second cap refill, good distal pulses      Assessment and Plan:     Assessment & Plan  1. Colitis.  - Symptoms of abdominal pain, nausea, vomiting, and diarrhea are consistent with colitis.  - CT scan showed colon wall thickening and inflammation.  - Treated with metronidazole and Zofran during hospital stay.  - Urgent referral to gastroenterology for further evaluation, including potential colonoscopy and endoscopy. [place] diet recommended. Prescription for Z-Branden sent to pharmacy for  her rti symptoms per her request    2. Type 2 Diabetes Mellitus.  - Semaglutide (Ozempic) will be discontinued due to potential side effects.  - New prescription for tirzepatide 2.5 mg initiated, dosage to be adjusted based on tolerance and response.  - Advised to monitor for any adverse effects and report promptly.    3. Short-term disability.  - Paperwork for short-term disability completed.  - Advised to return to work on " 06/18/2025 without restrictions.    4. Elevated white blood cell count.  - White blood cell count elevated during hospital stay, decreased to 12.7.  - CBC and CMP ordered to monitor white blood cell count and overall health status.    5. Suspected viral infection.  - Noticed green phlegm in chest over past two days.  - Z-Branden prescribed for potential lung infection, advised to fill prescription only if symptoms worsen.    Follow-up  - Follow-up in 4 weeks or as needed.    Form completion  See scanned section for disability form filled out from 6/2/25- we 6/18/25.        1. Type 2 diabetes mellitus with hyperglycemia, without long-term current use of insulin (HCC)  - Tirzepatide 2.5 MG/0.5ML Solution Auto-injector; Inject 2.5mg under the skin every seven days and repeat weekly. Dx type 2 diabetes  Dispense: 2 mL; Refill: 2    2. Constipation, unspecified constipation type    3. Colitis  - Referral to Gastroenterology      - Chart and discharge summary were reviewed.   - Hospitalization and results reviewed with patient.   - Medications reviewed including instructions regarding high risk medications, dosing and side effects.  - Recommended Services: No services needed at this time  - Advance directive/POLST on file?  Yes    () Today's E/M visit is associated with medical care services that serve as the continuing focal point for all needed health care services and/or with medical care services that are part of ongoing care related to a patient's single, serious condition, or a complex condition: This includes furnishing services to patients on an ongoing basis that result in care that is personalized to the patient. The services result in a comprehensive, longitudinal, and continuous relationship with the patient and involve delivery of team-based care that is accessible, coordinated with other practitioners and providers, and integrated with the broader health care landscape.       Follow-up:1 month or  prn      Face-to-face transitional care management services with MODERATE (today's visit is within 14 days post discharge & LACE+ score of 28-58) medical decision complexity were provided.     LACE+ Historical Score Over Time (0-28: Low, 29-58: Medium, 59+: High): 58                [1]   Current Outpatient Medications   Medication Sig Dispense Refill    Tirzepatide 2.5 MG/0.5ML Solution Auto-injector Inject 2.5mg under the skin every seven days and repeat weekly. Dx type 2 diabetes 2 mL 2    benzocaine-menthol (CEPACOL) 15-3.6 MG Lozenge Dissolve 1 Lozenge in the mouth every 2 hours as needed (sore throat).      omeprazole (PRILOSEC) 20 MG delayed-release capsule Take 1 Capsule by mouth every day. 30 Capsule 0    ondansetron (ZOFRAN ODT) 4 MG TABLET DISPERSIBLE Dissolve 1 Tablet by mouth every four hours as needed for Nausea/Vomiting. 10 Tablet 0    albuterol 108 (90 Base) MCG/ACT Aero Soln inhalation aerosol Inhale 1-2 Puffs every four hours as needed for Shortness of Breath.      sertraline (ZOLOFT) 50 MG Tab Take 1 Tablet by mouth every day. 90 Tablet 3    rosuvastatin (CRESTOR) 5 MG Tab TAKE ONE TABLET BY MOUTH IN THE EVENING 90 Tablet 3    metFORMIN (GLUCOPHAGE) 500 MG Tab Take 1 Tablet by mouth 2 times a day with meals. 180 Tablet 3     No current facility-administered medications for this visit.   [2]   Social History  Tobacco Use    Smoking status: Former     Current packs/day: 0.00     Average packs/day: 1 pack/day for 27.3 years (27.3 ttl pk-yrs)     Types: Cigarettes     Start date: 1989     Quit date: 2016     Years since quittin.1    Smokeless tobacco: Never   Vaping Use    Vaping status: Some Days   Substance Use Topics    Alcohol use: Yes     Comment: rarely     Drug use: Yes     Types: Marijuana, Inhaled, Oral     Comment: daily

## 2025-06-13 ENCOUNTER — PATIENT MESSAGE (OUTPATIENT)
Dept: MEDICAL GROUP | Age: 45
End: 2025-06-13
Payer: COMMERCIAL

## 2025-06-19 NOTE — Clinical Note
REFERRAL APPROVAL NOTICE         Sent on June 19, 2025                   Caridad Raman Mica  [address] [address]                   Dear Ms. Nino,    After a careful review of the medical information and benefit coverage, [facility] has processed your referral. See below for additional details.    If applicable, you must be actively enrolled with your insurance for coverage of the authorized service. If you have any questions regarding your coverage, please contact your insurance directly.    REFERRAL INFORMATION   Referral #:  [id number]  Referred-To Provider    Referred-By Provider:  Gastroenterology    Ivania Prince P.A.-C.   [facility]      [address] [facility] [address] [place]   [place] [id number] [phone] [address] [facility] [facility] [address]  [phone]    Referral Start Date:  06/12/2025  Referral End Date:   06/12/2026             SCHEDULING  If you do not already have an appointment, please call [phone] to make an appointment.     MORE INFORMATION  If you do not already have a [facility] account, sign up at: [email or web address]  You can access your medical information, make appointments, see lab results, billing information, and more.  If you have questions regarding this referral, please contact  the [facility] Referrals department at:             [phone]. Monday - Friday 8:00AM - 5:00PM.     Sincerely,    [facility]

## 2025-06-24 ENCOUNTER — TELEPHONE (OUTPATIENT)
Dept: MEDICAL GROUP | Age: 45
End: 2025-06-24
Payer: COMMERCIAL

## 2025-06-24 DIAGNOSIS — E11.65 TYPE 2 DIABETES MELLITUS WITH HYPERGLYCEMIA, WITHOUT LONG-TERM CURRENT USE OF INSULIN (HCC): Primary | ICD-10-CM

## 2025-06-24 NOTE — TELEPHONE ENCOUNTER
Patient was on Ozempic and was getting GI side effects.  Requested for us to try Mounjaro.  We did send Mounjaro to the pharmacy and her insurance required prior Auth.  We did do the prior authorization and her insurance denied it.  I did let her know this

## 2025-07-22 ENCOUNTER — PATIENT MESSAGE (OUTPATIENT)
Dept: MEDICAL GROUP | Age: 45
End: 2025-07-22
Payer: COMMERCIAL

## 2025-07-22 NOTE — TELEPHONE ENCOUNTER
Did patient drop off FMLA paperwork?  Patient needs to come with FMLA paperwork.  I do not see previous messages regarding her FMLA question.  What is her question regarding FMLA.  In general patient needs to come with FMLA and I fill that out at time of visit.

## 2025-07-22 NOTE — TELEPHONE ENCOUNTER
Amadeo Rubio,   Will you look into this for me.  Thank you. Im not sure wheat need be done with the FMLA that was done by me on 6/12  I appreciate your time

## 2025-07-23 ENCOUNTER — HOSPITAL ENCOUNTER (OUTPATIENT)
Dept: LAB | Facility: MEDICAL CENTER | Age: 45
End: 2025-07-23
Attending: PHYSICIAN ASSISTANT
Payer: COMMERCIAL

## 2025-07-23 DIAGNOSIS — K52.9 COLITIS: ICD-10-CM

## 2025-07-23 LAB
ALBUMIN SERPL BCP-MCNC: 4.2 G/DL (ref 3.2–4.9)
ALBUMIN/GLOB SERPL: 1.5 G/DL
ALP SERPL-CCNC: 73 U/L (ref 30–99)
ALT SERPL-CCNC: 13 U/L (ref 2–50)
ANION GAP SERPL CALC-SCNC: 9 MMOL/L (ref 7–16)
AST SERPL-CCNC: 17 U/L (ref 12–45)
BASOPHILS # BLD AUTO: 0.5 % (ref 0–1.8)
BASOPHILS # BLD: 0.03 K/UL (ref 0–0.12)
BILIRUB SERPL-MCNC: 0.4 MG/DL (ref 0.1–1.5)
BUN SERPL-MCNC: 12 MG/DL (ref 8–22)
CALCIUM ALBUM COR SERPL-MCNC: 9.3 MG/DL (ref 8.5–10.5)
CALCIUM SERPL-MCNC: 9.5 MG/DL (ref 8.5–10.5)
CHLORIDE SERPL-SCNC: 104 MMOL/L (ref 96–112)
CO2 SERPL-SCNC: 26 MMOL/L (ref 20–33)
CREAT SERPL-MCNC: 0.71 MG/DL (ref 0.5–1.4)
EOSINOPHIL # BLD AUTO: 0.2 K/UL (ref 0–0.51)
EOSINOPHIL NFR BLD: 3.6 % (ref 0–6.9)
ERYTHROCYTE [DISTWIDTH] IN BLOOD BY AUTOMATED COUNT: 49.4 FL (ref 35.9–50)
GFR SERPLBLD CREATININE-BSD FMLA CKD-EPI: 105 ML/MIN/1.73 M 2
GLOBULIN SER CALC-MCNC: 2.8 G/DL (ref 1.9–3.5)
GLUCOSE SERPL-MCNC: 108 MG/DL (ref 65–99)
HCT VFR BLD AUTO: 42.4 % (ref 37–47)
HGB BLD-MCNC: 13 G/DL (ref 12–16)
IMM GRANULOCYTES # BLD AUTO: 0.01 K/UL (ref 0–0.11)
IMM GRANULOCYTES NFR BLD AUTO: 0.2 % (ref 0–0.9)
LYMPHOCYTES # BLD AUTO: 2.06 K/UL (ref 1–4.8)
LYMPHOCYTES NFR BLD: 37.5 % (ref 22–41)
MCH RBC QN AUTO: 28.8 PG (ref 27–33)
MCHC RBC AUTO-ENTMCNC: 30.7 G/DL (ref 32.2–35.5)
MCV RBC AUTO: 94 FL (ref 81.4–97.8)
MONOCYTES # BLD AUTO: 0.49 K/UL (ref 0–0.85)
MONOCYTES NFR BLD AUTO: 8.9 % (ref 0–13.4)
NEUTROPHILS # BLD AUTO: 2.7 K/UL (ref 1.82–7.42)
NEUTROPHILS NFR BLD: 49.3 % (ref 44–72)
NRBC # BLD AUTO: 0 K/UL
NRBC BLD-RTO: 0 /100 WBC (ref 0–0.2)
PLATELET # BLD AUTO: 133 K/UL (ref 164–446)
PMV BLD AUTO: 12.7 FL (ref 9–12.9)
POTASSIUM SERPL-SCNC: 4.4 MMOL/L (ref 3.6–5.5)
PROT SERPL-MCNC: 7 G/DL (ref 6–8.2)
RBC # BLD AUTO: 4.51 M/UL (ref 4.2–5.4)
SODIUM SERPL-SCNC: 139 MMOL/L (ref 135–145)
WBC # BLD AUTO: 5.5 K/UL (ref 4.8–10.8)

## 2025-07-23 PROCEDURE — 85025 COMPLETE CBC W/AUTO DIFF WBC: CPT

## 2025-07-23 PROCEDURE — 36415 COLL VENOUS BLD VENIPUNCTURE: CPT

## 2025-07-23 PROCEDURE — 80053 COMPREHEN METABOLIC PANEL: CPT

## 2025-07-24 ENCOUNTER — OFFICE VISIT (OUTPATIENT)
Dept: MEDICAL GROUP | Age: 45
End: 2025-07-24
Payer: COMMERCIAL

## 2025-07-24 VITALS
DIASTOLIC BLOOD PRESSURE: 70 MMHG | WEIGHT: 202 LBS | OXYGEN SATURATION: 96 % | TEMPERATURE: 97.5 F | HEIGHT: 67 IN | SYSTOLIC BLOOD PRESSURE: 120 MMHG | BODY MASS INDEX: 31.71 KG/M2 | HEART RATE: 75 BPM

## 2025-07-24 DIAGNOSIS — K52.9 COLITIS: Primary | ICD-10-CM

## 2025-07-24 DIAGNOSIS — E11.65 TYPE 2 DIABETES MELLITUS WITH HYPERGLYCEMIA, WITHOUT LONG-TERM CURRENT USE OF INSULIN (HCC): ICD-10-CM

## 2025-07-24 PROCEDURE — 99214 OFFICE O/P EST MOD 30 MIN: CPT | Performed by: PHYSICIAN ASSISTANT

## 2025-07-24 PROCEDURE — 3078F DIAST BP <80 MM HG: CPT | Performed by: PHYSICIAN ASSISTANT

## 2025-07-24 PROCEDURE — 3074F SYST BP LT 130 MM HG: CPT | Performed by: PHYSICIAN ASSISTANT

## 2025-07-24 ASSESSMENT — FIBROSIS 4 INDEX: FIB4 SCORE: 1.67

## 2025-07-24 NOTE — PROGRESS NOTES
Subjective:     History of Present Illness  The patient presents for evaluation of colitis.    She was hospitalized from 06/02/2025 to 06/04/2025 due to colitis. She has been informed that her FMLA period extends from 06/02/2025 to 05/31/2026. She is scheduled for a colonoscopy in September 2025. She reports no restrictions at work, except for the need to use the restroom as necessary. Her bowel movements are irregular, with instances of not having a bowel movement for three days, followed by two in one day.      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    She has no past medical history of Addisons disease ([facility]), Allergy, Anesthesia, Anginal syndrome ([facility]), Anxiety, Arrhythmia, Arthritis, Blood clotting disorder ([facility]), Blood transfusion without reported diagnosis, Bowel habit changes, Breath shortness, Carcinoma in situ of respiratory system, Cataract, Clotting disorder ([facility]), Cold, Continuous ambulatory peritoneal dialysis status ([facility]), Coughing blood, Cushings syndrome ([facility]), Depression, Diabetic neuropathy ([facility]), Dialysis patient ([facility]), Encounter for long-term (current) use of other medications, GERD (gastroesophageal reflux disease), Glaucoma, Gynecological disorder, Heart attack (HCC), Heart murmur, Heart valve disease, Hemorrhagic disorder ([facility]), Hepatitis A, Hepatitis B, Hepatitis C, Hiatus hernia syndrome, High cholesterol, HIV (human  "immunodeficiency virus infection) (HCC), Indigestion, Jaundice, Meningitis, Migraine, Osteoporosis, Pacemaker, Parathyroid disorder (HCC), Pituitary disease (HCC), Pulmonary emphysema (HCC), Rheumatic fever, Seizure (HCC), Sickle cell disease (HCC), Sleep apnea, Substance abuse (HCC), Tuberculosis, Urinary bladder disorder, or Urinary incontinence.    ROS   No chest pain, no shortness of breath, no abdominal pain  Positive ROS as per HPI.  All other systems reviewed and are negative.     Objective:     /70 (BP Location: Left arm, Patient Position: Sitting, BP Cuff Size: Large adult)   Pulse 75   Temp 36.4 °C (97.5 °F) (Temporal)   Ht 1.702 m (5' 7\")   Wt 91.6 kg (202 lb)   SpO2 96%  Body mass index is 31.64 kg/m².   Physical Exam    Constitutional: Alert, no distress.  Skin: Warm, dry, good turgor, no rashes in visible areas.  Eye: Equal, round and reactive, conjunctiva clear, lids normal.  ENMT: Lips without lesions, good dentition, oropharynx clear.  Neck: Trachea midline, no masses, no thyromegaly. No cervical or supraclavicular lymphadenopathy  Respiratory: Unlabored respiratory effort, lungs clear to auscultation, no wheezes, no ronchi.  Cardiovascular: Normal S1, S2, no murmur, no edema.  Abdomen: Soft, non-tender, no masses, no hepatosplenomegaly.  Psych: Alert and oriented x3, normal affect and mood.      Results          Assessment and Plan:   The following treatment plan was discussed    Assessment & Plan  1. Colitis.  - The patient's FMLA forms for her company [employer] were completed.  - She was released to full duty on 06/18/2025 but will have intermittent leave up to four times per month, 8 hours at a time, from 06/02/2025 to 05/31/2026.  - She will need treatment visits at least twice per year due to this condition.  - A colonoscopy is scheduled for September 2025 to ensure there are no masses, lesions, or colitis.  - please see scanned section  Follow-up: 09/24/2025.  See scanned section " for patient's at [facility] forms    ORDERS:  1. Colitis (Primary)      2. Type 2 diabetes mellitus with hyperglycemia, without long-term current use of insulin ([facility]) number      () Today's E/M visit is associated with medical care services that serve as the continuing focal point for all needed health care services and/or with medical care services that are part of ongoing care related to a patient's single, serious condition, or a complex condition: This includes furnishing services to patients on an ongoing basis that result in care that is personalized to the patient. The services result in a comprehensive, longitudinal, and continuous relationship with the patient and involve delivery of team-based care that is accessible, coordinated with other practitioners and providers, and integrated with the broader health care landscape.           Follow Up: 12 weeks    Please note that this dictation was created using voice recognition software. I have made every reasonable attempt to correct obvious errors, but I expect that there are errors of grammar and possibly content that I did not discover before finalizing the note.      Attestation      Verbal consent was acquired by the patient to use [employer] ambient listening note generation during this visit Yes                  [1]   Current Outpatient Medications   Medication Sig Dispense Refill    Tirzepatide 2.5 MG/0.5ML Solution Auto-injector Inject 2.5mg under the skin every seven days and repeat weekly. Dx type 2 diabetes 2 mL 2    benzocaine-menthol (CEPACOL) 15-3.6 MG Lozenge Dissolve 1 Lozenge in the mouth every 2 hours as needed (sore throat).      omeprazole (PRILOSEC) 20 MG delayed-release capsule Take 1 Capsule by mouth every day. 30 Capsule 0    ondansetron (ZOFRAN ODT) 4 MG TABLET DISPERSIBLE Dissolve 1 Tablet by mouth every four hours as needed for Nausea/Vomiting. 10 Tablet 0    albuterol 108 (90 Base) MCG/ACT Aero Soln inhalation aerosol Inhale 1-2  Puffs every four hours as needed for Shortness of Breath.      sertraline (ZOLOFT) 50 MG Tab Take 1 Tablet by mouth every day. 90 Tablet 3    rosuvastatin (CRESTOR) 5 MG Tab TAKE ONE TABLET BY MOUTH IN THE EVENING 90 Tablet 3    metFORMIN (GLUCOPHAGE) 500 MG Tab Take 1 Tablet by mouth 2 times a day with meals. 180 Tablet 3    azithromycin (ZITHROMAX) 250 MG Tab Take two tablets on day one, then on tablet the following four days (Patient not taking: Reported on 7/24/2025) 6 Tablet 0     No current facility-administered medications for this visit.

## 2025-07-31 DIAGNOSIS — E11.65 TYPE 2 DIABETES MELLITUS WITH HYPERGLYCEMIA, WITHOUT LONG-TERM CURRENT USE OF INSULIN (HCC): ICD-10-CM

## 2025-08-28 ENCOUNTER — OFFICE VISIT (OUTPATIENT)
Dept: URGENT CARE | Facility: CLINIC | Age: 45
End: 2025-08-28
Payer: COMMERCIAL

## 2025-08-28 ENCOUNTER — PHARMACY VISIT (OUTPATIENT)
Dept: PHARMACY | Facility: MEDICAL CENTER | Age: 45
End: 2025-08-28
Payer: COMMERCIAL

## 2025-08-28 VITALS
HEIGHT: 67 IN | BODY MASS INDEX: 32.8 KG/M2 | DIASTOLIC BLOOD PRESSURE: 70 MMHG | RESPIRATION RATE: 18 BRPM | OXYGEN SATURATION: 95 % | HEART RATE: 78 BPM | TEMPERATURE: 97.6 F | WEIGHT: 209 LBS | SYSTOLIC BLOOD PRESSURE: 110 MMHG

## 2025-08-28 DIAGNOSIS — K08.89 PAIN, DENTAL: Primary | ICD-10-CM

## 2025-08-28 PROCEDURE — RXMED WILLOW AMBULATORY MEDICATION CHARGE

## 2025-08-28 RX ORDER — CLINDAMYCIN HYDROCHLORIDE 300 MG/1
300 CAPSULE ORAL 3 TIMES DAILY
Qty: 21 CAPSULE | Refills: 0 | Status: SHIPPED | OUTPATIENT
Start: 2025-08-28 | End: 2025-09-04

## 2025-08-28 RX ORDER — KETOROLAC TROMETHAMINE 15 MG/ML
15 INJECTION, SOLUTION INTRAMUSCULAR; INTRAVENOUS ONCE
Status: COMPLETED | OUTPATIENT
Start: 2025-08-28 | End: 2025-08-28

## 2025-08-28 RX ADMIN — KETOROLAC TROMETHAMINE 15 MG: 15 INJECTION, SOLUTION INTRAMUSCULAR; INTRAVENOUS at 20:14

## 2025-08-28 ASSESSMENT — ENCOUNTER SYMPTOMS
NEUROLOGICAL NEGATIVE: 1
SORE THROAT: 0
EYES NEGATIVE: 1
FEVER: 0
GASTROINTESTINAL NEGATIVE: 1
COUGH: 0
SHORTNESS OF BREATH: 0
CHILLS: 0
MUSCULOSKELETAL NEGATIVE: 1

## 2025-08-28 ASSESSMENT — FIBROSIS 4 INDEX: FIB4 SCORE: 1.67
